# Patient Record
Sex: FEMALE | Race: WHITE | NOT HISPANIC OR LATINO | Employment: OTHER | URBAN - METROPOLITAN AREA
[De-identification: names, ages, dates, MRNs, and addresses within clinical notes are randomized per-mention and may not be internally consistent; named-entity substitution may affect disease eponyms.]

---

## 2017-05-23 DIAGNOSIS — E87.70 FLUID EXCESS: ICD-10-CM

## 2017-05-23 DIAGNOSIS — M79.609 LIMB PAIN: ICD-10-CM

## 2017-05-23 DIAGNOSIS — R06.00 DYSPNEA: Primary | ICD-10-CM

## 2017-06-08 ENCOUNTER — HOSPITAL ENCOUNTER (OUTPATIENT)
Dept: PULMONOLOGY | Facility: HOSPITAL | Age: 82
Discharge: HOME | End: 2017-06-08
Attending: INTERNAL MEDICINE
Payer: MEDICARE

## 2017-06-08 ENCOUNTER — HOSPITAL ENCOUNTER (OUTPATIENT)
Dept: RADIOLOGY | Facility: HOSPITAL | Age: 82
Discharge: HOME | End: 2017-06-08
Attending: INTERNAL MEDICINE
Payer: MEDICARE

## 2017-06-08 DIAGNOSIS — R06.00 DYSPNEA: ICD-10-CM

## 2017-06-08 DIAGNOSIS — E87.70 FLUID EXCESS: ICD-10-CM

## 2017-06-08 DIAGNOSIS — M79.609 LIMB PAIN: ICD-10-CM

## 2017-06-08 PROCEDURE — 93970 EXTREMITY STUDY: CPT | Mod: 26,,, | Performed by: RADIOLOGY

## 2017-06-08 PROCEDURE — 94010 BREATHING CAPACITY TEST: CPT

## 2017-06-08 PROCEDURE — 94726 PLETHYSMOGRAPHY LUNG VOLUMES: CPT

## 2017-06-08 PROCEDURE — 94729 DIFFUSING CAPACITY: CPT

## 2017-06-09 NOTE — PROCEDURES
This test meets ATS standards for quality and reproducibility.    FEV1 normal  FVC normal  FEV1/FVC RATIO normal   TLC normal  DLCO decreased    Impression:  No obstruction.  No restriction.  Moderately decreased DLCO uncorrected for hemoglobin.    Compared with PFTs from 2014, FEV1 and FVC both improved.      Adarsh Goncalves MD  Fellow, [facility]  Cell [phone]

## 2017-06-13 DIAGNOSIS — I10 HYPERTENSION: Primary | ICD-10-CM

## 2017-06-16 DIAGNOSIS — Z12.31 VISIT FOR SCREENING MAMMOGRAM: Primary | ICD-10-CM

## 2017-06-20 ENCOUNTER — HOSPITAL ENCOUNTER (OUTPATIENT)
Dept: RADIOLOGY | Facility: HOSPITAL | Age: 82
Discharge: HOME | End: 2017-06-20
Attending: INTERNAL MEDICINE
Payer: MEDICARE

## 2017-06-20 DIAGNOSIS — I10 HYPERTENSION: ICD-10-CM

## 2017-06-20 PROCEDURE — 71250 CT THORAX DX C-: CPT | Mod: TC

## 2017-06-20 PROCEDURE — 71250 CT THORAX DX C-: CPT | Mod: 26,,, | Performed by: RADIOLOGY

## 2017-07-11 DIAGNOSIS — J44.9 COPD (CHRONIC OBSTRUCTIVE PULMONARY DISEASE): Primary | ICD-10-CM

## 2017-07-13 ENCOUNTER — HOSPITAL ENCOUNTER (OUTPATIENT)
Dept: RADIOLOGY | Facility: HOSPITAL | Age: 82
Discharge: HOME | End: 2017-07-13
Attending: INTERNAL MEDICINE
Payer: MEDICARE

## 2017-07-13 VITALS — BODY MASS INDEX: 26.22 KG/M2 | HEIGHT: 63 IN | WEIGHT: 148 LBS

## 2017-07-13 DIAGNOSIS — Z12.31 VISIT FOR SCREENING MAMMOGRAM: ICD-10-CM

## 2017-07-13 PROCEDURE — 77063 BREAST TOMOSYNTHESIS BI: CPT | Mod: 26,,, | Performed by: RADIOLOGY

## 2017-07-13 PROCEDURE — 77067 SCR MAMMO BI INCL CAD: CPT | Mod: TC

## 2017-07-13 PROCEDURE — 77067 SCR MAMMO BI INCL CAD: CPT | Mod: 26,,, | Performed by: RADIOLOGY

## 2017-07-19 ENCOUNTER — HOSPITAL ENCOUNTER (OUTPATIENT)
Dept: RADIOLOGY | Facility: HOSPITAL | Age: 82
Discharge: HOME | End: 2017-07-19
Attending: INTERNAL MEDICINE
Payer: MEDICARE

## 2017-07-19 DIAGNOSIS — J44.9 COPD (CHRONIC OBSTRUCTIVE PULMONARY DISEASE): Primary | ICD-10-CM

## 2017-07-19 DIAGNOSIS — J44.9 COPD (CHRONIC OBSTRUCTIVE PULMONARY DISEASE): ICD-10-CM

## 2017-07-19 PROCEDURE — 78582 LUNG VENTILAT&PERFUS IMAGING: CPT | Mod: 26,,, | Performed by: RADIOLOGY

## 2017-07-19 PROCEDURE — 71020 XR CHEST PA AND LATERAL: CPT | Mod: TC

## 2017-07-19 PROCEDURE — 78582 LUNG VENTILAT&PERFUS IMAGING: CPT | Mod: TC

## 2017-07-19 PROCEDURE — 71020 XR CHEST PA AND LATERAL: CPT | Mod: 26,,, | Performed by: RADIOLOGY

## 2017-07-19 PROCEDURE — A9558 XE133 XENON 10MCI: HCPCS

## 2017-08-04 ENCOUNTER — HOSPITAL ENCOUNTER (OUTPATIENT)
Dept: RADIOLOGY | Facility: HOSPITAL | Age: 82
Discharge: HOME | End: 2017-08-04
Attending: INTERNAL MEDICINE
Payer: MEDICARE

## 2017-08-04 DIAGNOSIS — M17.11 PRIMARY OSTEOARTHRITIS OF RIGHT KNEE: Primary | ICD-10-CM

## 2017-08-04 DIAGNOSIS — M17.11 PRIMARY OSTEOARTHRITIS OF RIGHT KNEE: ICD-10-CM

## 2017-08-04 PROCEDURE — 73560 X-RAY EXAM OF KNEE 1 OR 2: CPT | Mod: TC,RT

## 2017-08-04 PROCEDURE — 73560 X-RAY EXAM OF KNEE 1 OR 2: CPT | Mod: 26,RT,, | Performed by: RADIOLOGY

## 2017-08-09 DIAGNOSIS — J44.9 COPD (CHRONIC OBSTRUCTIVE PULMONARY DISEASE): ICD-10-CM

## 2017-08-09 DIAGNOSIS — J98.8 AIRWAY OBSTRUCTION: Primary | ICD-10-CM

## 2017-08-09 DIAGNOSIS — I10 HTN (HYPERTENSION): Primary | ICD-10-CM

## 2017-08-22 ENCOUNTER — OFFICE VISIT (OUTPATIENT)
Dept: CARDIOLOGY | Facility: CLINIC | Age: 82
End: 2017-08-22
Payer: MEDICARE

## 2017-08-22 VITALS
SYSTOLIC BLOOD PRESSURE: 137 MMHG | OXYGEN SATURATION: 99 % | HEART RATE: 74 BPM | WEIGHT: 147.81 LBS | BODY MASS INDEX: 26.19 KG/M2 | HEIGHT: 63 IN | DIASTOLIC BLOOD PRESSURE: 70 MMHG

## 2017-08-22 DIAGNOSIS — E78.5 HYPERLIPIDEMIA, UNSPECIFIED HYPERLIPIDEMIA TYPE: ICD-10-CM

## 2017-08-22 DIAGNOSIS — R06.02 SOBOE (SHORTNESS OF BREATH ON EXERTION): Primary | ICD-10-CM

## 2017-08-22 DIAGNOSIS — I10 ESSENTIAL HYPERTENSION: ICD-10-CM

## 2017-08-22 PROCEDURE — 1126F AMNT PAIN NOTED NONE PRSNT: CPT | Mod: S$GLB,,, | Performed by: INTERNAL MEDICINE

## 2017-08-22 PROCEDURE — 3008F BODY MASS INDEX DOCD: CPT | Mod: S$GLB,,, | Performed by: INTERNAL MEDICINE

## 2017-08-22 PROCEDURE — 1159F MED LIST DOCD IN RCRD: CPT | Mod: S$GLB,,, | Performed by: INTERNAL MEDICINE

## 2017-08-22 PROCEDURE — 99999 PR PBB SHADOW E&M-EST. PATIENT-LVL III: CPT | Mod: PBBFAC,,, | Performed by: INTERNAL MEDICINE

## 2017-08-22 PROCEDURE — 3078F DIAST BP <80 MM HG: CPT | Mod: S$GLB,,, | Performed by: INTERNAL MEDICINE

## 2017-08-22 PROCEDURE — 99214 OFFICE O/P EST MOD 30 MIN: CPT | Mod: S$GLB,,, | Performed by: INTERNAL MEDICINE

## 2017-08-22 PROCEDURE — 3075F SYST BP GE 130 - 139MM HG: CPT | Mod: S$GLB,,, | Performed by: INTERNAL MEDICINE

## 2017-08-22 RX ORDER — FUROSEMIDE 20 MG/1
TABLET ORAL
COMMUNITY
Start: 2017-08-06 | End: 2017-09-22

## 2017-08-22 RX ORDER — CELECOXIB 200 MG/1
CAPSULE ORAL 2 TIMES DAILY
COMMUNITY
Start: 2017-08-04 | End: 2020-10-20

## 2017-08-22 NOTE — LETTER
August 22, 2017      Vijay Carrizales MD  [address]  [address]  [facility] [address]           [facility]  [address], [address]  [facility] [address]  Phone: [phone]          Patient: Lu White   MR Number: [id number]   YOB: 1932   Date of Visit: 8/22/2017       Dear Dr. Vijay Carrizales:    Thank you for referring Lu White to me for evaluation. Attached you will find relevant portions of my assessment and plan of care.    If you have questions, please do not hesitate to call me. I look forward to following Lu White along with you.    Sincerely,    Andreas Munoz MD    Enclosure  CC:  No Recipients    If you would like to receive this communication electronically, please contact [email or web address] or [phone] to request more information on [employer] Link access.    For providers and/or their staff who would like to refer a patient to [facility], please contact us through our one-stop-shop provider referral line, [facility], at [phone].    If you feel you have received this communication in error or would no longer like to receive these types of communications, please e-mail [email or web address]

## 2017-08-22 NOTE — PROGRESS NOTES
Subjective:    Patient ID:  Lu White is a 84 y.o. female who presents for evaluation of Shortness of Breath      HPI  85 y/o female seen by Dr Burnett in the past with a hx of HTN, HLD, who presents for evaluation of SOB. She has been having SOB and has had an extensive cardiac and pulmonary w/u. Has mild PH per 2DE. LAM has worsened over the last several months. She denies CP, orthopnea, PND, syncope, palps. Compliant with meds. Family hx of CAD and sister with similar symptoms and subsequent CABG.     Review of Systems   Constitution: Negative for weakness and malaise/fatigue.   HENT: Negative for congestion and headaches.    Eyes: Negative for blurred vision.   Cardiovascular: Positive for dyspnea on exertion. Negative for chest pain, claudication, cyanosis, irregular heartbeat, leg swelling, near-syncope, orthopnea, palpitations, paroxysmal nocturnal dyspnea and syncope.   Respiratory: Negative for shortness of breath.    Endocrine: Negative for polyuria.   Hematologic/Lymphatic: Negative for bleeding problem.   Skin: Negative for itching and rash.   Musculoskeletal: Negative for joint swelling, muscle cramps and muscle weakness.   Gastrointestinal: Negative for abdominal pain, hematemesis, hematochezia, melena, nausea and vomiting.   Genitourinary: Negative for dysuria and hematuria.   Neurological: Negative for dizziness, focal weakness, light-headedness and loss of balance.   Psychiatric/Behavioral: Negative for depression. The patient is not nervous/anxious.         Objective:    Physical Exam   Constitutional: She is oriented to person, place, and time. She appears well-developed and well-nourished.   HENT:   Head: Normocephalic and atraumatic.   Neck: Neck supple. No JVD present.   Cardiovascular: Normal rate, regular rhythm and normal heart sounds.    Pulses:       Carotid pulses are 2+ on the right side, and 2+ on the left side.       Radial pulses are 2+ on the right side, and 2+ on the left  side.        Femoral pulses are 2+ on the right side, and 2+ on the left side.       Dorsalis pedis pulses are 2+ on the right side, and 2+ on the left side.        Posterior tibial pulses are 2+ on the right side, and 2+ on the left side.   Pulmonary/Chest: Effort normal and breath sounds normal.   Abdominal: Soft. Bowel sounds are normal.   Musculoskeletal: She exhibits no edema.   Neurological: She is alert and oriented to person, place, and time.   Skin: Skin is warm and dry.   Psychiatric: She has a normal mood and affect. Her behavior is normal. Thought content normal.         Assessment:       1. SOBOE (shortness of breath on exertion)    2. Hyperlipidemia, unspecified hyperlipidemia type    3. Essential hypertension      83 y/o female with hx and presentation as above. Will evaluate for SOB as an anginal equivalent or cardiac in etiology with PET stress and 2DE.        Plan:       -PET stress  -2DE with CFD  -f/u phone review

## 2017-08-30 ENCOUNTER — HOSPITAL ENCOUNTER (OUTPATIENT)
Dept: CARDIOLOGY | Facility: CLINIC | Age: 82
Discharge: HOME | End: 2017-08-30
Payer: MEDICARE

## 2017-08-30 DIAGNOSIS — R06.02 SOBOE (SHORTNESS OF BREATH ON EXERTION): ICD-10-CM

## 2017-08-30 DIAGNOSIS — I10 ESSENTIAL HYPERTENSION: ICD-10-CM

## 2017-08-30 LAB
DIASTOLIC DYSFUNCTION: YES
ESTIMATED PA SYSTOLIC PRESSURE: 30.04
MITRAL VALVE MOBILITY: NORMAL
MITRAL VALVE REGURGITATION: ABNORMAL
RETIRED EF AND QEF - SEE NOTES: 65 (ref 55–65)
TRICUSPID VALVE REGURGITATION: ABNORMAL

## 2017-08-30 PROCEDURE — 93306 TTE W/DOPPLER COMPLETE: CPT | Mod: S$GLB,,, | Performed by: INTERNAL MEDICINE

## 2017-09-05 ENCOUNTER — CLINICAL SUPPORT (OUTPATIENT)
Dept: CARDIOLOGY | Facility: CLINIC | Age: 82
End: 2017-09-05
Payer: MEDICARE

## 2017-09-05 ENCOUNTER — TELEPHONE (OUTPATIENT)
Dept: CARDIOLOGY | Facility: CLINIC | Age: 82
End: 2017-09-05

## 2017-09-05 DIAGNOSIS — R06.02 SOBOE (SHORTNESS OF BREATH ON EXERTION): ICD-10-CM

## 2017-09-05 DIAGNOSIS — I10 ESSENTIAL HYPERTENSION: ICD-10-CM

## 2017-09-05 PROCEDURE — A9555 RB82 RUBIDIUM: HCPCS | Mod: S$GLB,,, | Performed by: INTERNAL MEDICINE

## 2017-09-05 PROCEDURE — 93015 CV STRESS TEST SUPVJ I&R: CPT | Mod: S$GLB,,, | Performed by: INTERNAL MEDICINE

## 2017-09-05 PROCEDURE — 78492 MYOCRD IMG PET MLT RST&STRS: CPT | Mod: S$GLB,,, | Performed by: INTERNAL MEDICINE

## 2017-09-05 NOTE — TELEPHONE ENCOUNTER
----- Message from Andreas Munoz MD sent at 8/31/2017  8:47 AM CDT -----  Please let Ms White know that her echo shows normal heart function and valve function. She has diastolic dysfunction which is abnormal relaxation of heart muscle due to chronic high blood pressure and treatment is just blood pressure control and low salt diet  Thanks  RADHA

## 2017-09-07 ENCOUNTER — TELEPHONE (OUTPATIENT)
Dept: CARDIOLOGY | Facility: CLINIC | Age: 82
End: 2017-09-07

## 2017-09-07 NOTE — TELEPHONE ENCOUNTER
----- Message from Andreas Munoz MD sent at 9/7/2017  1:05 PM CDT -----  Please let Ms White know that her stress test was normal   Thanks  RADHA

## 2017-09-11 ENCOUNTER — PATIENT MESSAGE (OUTPATIENT)
Dept: CARDIOLOGY | Facility: CLINIC | Age: 82
End: 2017-09-11

## 2017-09-22 ENCOUNTER — OFFICE VISIT (OUTPATIENT)
Dept: CARDIOLOGY | Facility: CLINIC | Age: 82
End: 2017-09-22
Payer: MEDICARE

## 2017-09-22 VITALS
OXYGEN SATURATION: 96 % | BODY MASS INDEX: 26.64 KG/M2 | HEIGHT: 63 IN | HEART RATE: 66 BPM | SYSTOLIC BLOOD PRESSURE: 161 MMHG | WEIGHT: 150.38 LBS | DIASTOLIC BLOOD PRESSURE: 67 MMHG

## 2017-09-22 DIAGNOSIS — R06.02 SOBOE (SHORTNESS OF BREATH ON EXERTION): Primary | ICD-10-CM

## 2017-09-22 DIAGNOSIS — I51.89 DIASTOLIC DYSFUNCTION: ICD-10-CM

## 2017-09-22 DIAGNOSIS — I10 ESSENTIAL HYPERTENSION: ICD-10-CM

## 2017-09-22 DIAGNOSIS — E78.5 HYPERLIPIDEMIA, UNSPECIFIED HYPERLIPIDEMIA TYPE: ICD-10-CM

## 2017-09-22 PROCEDURE — 1159F MED LIST DOCD IN RCRD: CPT | Mod: S$GLB,,, | Performed by: INTERNAL MEDICINE

## 2017-09-22 PROCEDURE — 3078F DIAST BP <80 MM HG: CPT | Mod: S$GLB,,, | Performed by: INTERNAL MEDICINE

## 2017-09-22 PROCEDURE — 99214 OFFICE O/P EST MOD 30 MIN: CPT | Mod: S$GLB,,, | Performed by: INTERNAL MEDICINE

## 2017-09-22 PROCEDURE — 99999 PR PBB SHADOW E&M-EST. PATIENT-LVL III: CPT | Mod: PBBFAC,,, | Performed by: INTERNAL MEDICINE

## 2017-09-22 PROCEDURE — 3077F SYST BP >= 140 MM HG: CPT | Mod: S$GLB,,, | Performed by: INTERNAL MEDICINE

## 2017-09-22 PROCEDURE — 3008F BODY MASS INDEX DOCD: CPT | Mod: S$GLB,,, | Performed by: INTERNAL MEDICINE

## 2017-09-22 PROCEDURE — 1126F AMNT PAIN NOTED NONE PRSNT: CPT | Mod: S$GLB,,, | Performed by: INTERNAL MEDICINE

## 2017-09-22 RX ORDER — LOSARTAN POTASSIUM 50 MG/1
50 TABLET ORAL DAILY
COMMUNITY
End: 2020-10-20

## 2017-09-22 RX ORDER — CHLORTHALIDONE 25 MG/1
25 TABLET ORAL DAILY
Qty: 30 TABLET | Refills: 11 | Status: SHIPPED | OUTPATIENT
Start: 2017-09-22 | End: 2018-09-17 | Stop reason: SDUPTHER

## 2017-09-22 NOTE — PROGRESS NOTES
Subjective:    Patient ID:  Lu White is a 84 y.o. female who presents for follow-up of Shortness of Breath      HPI  83 y/o female with a hx of HTN, HLD, who presents for evaluation of SOB. She has been having SOB and has had an extensive cardiac and pulmonary w/u. LAM has worsened over the last several months. She denies CP, orthopnea, PND, syncope, palps. Compliant with meds. Family hx of CAD and sister with similar symptoms and subsequent CABG. Last clinic visit PET stress ordered (normal) and 2DE ordered which showed preserved EF and Grade I diastolic dysfunction. Medications have been changed and she is no longer on HCTZ 12.5 mg and was placed on lasix. Does not follow a low salt diet. States she mainly gets SOB when walking in the heat. Has seen Dr Burnett and Dr Mahan in the past.     Review of Systems   Constitution: Negative for weakness and malaise/fatigue.   HENT: Negative for congestion.    Eyes: Negative for blurred vision.   Cardiovascular: Positive for dyspnea on exertion. Negative for chest pain, claudication, cyanosis, irregular heartbeat, leg swelling, near-syncope, orthopnea, palpitations, paroxysmal nocturnal dyspnea and syncope.   Respiratory: Negative for shortness of breath.    Endocrine: Negative for polyuria.   Hematologic/Lymphatic: Negative for bleeding problem.   Skin: Negative for itching and rash.   Musculoskeletal: Negative for joint swelling, muscle cramps and muscle weakness.   Gastrointestinal: Negative for abdominal pain, hematemesis, hematochezia, melena, nausea and vomiting.   Genitourinary: Negative for dysuria and hematuria.   Neurological: Negative for dizziness, focal weakness, headaches, light-headedness and loss of balance.   Psychiatric/Behavioral: Negative for depression. The patient is not nervous/anxious.         Objective:    Physical Exam   Constitutional: She is oriented to person, place, and time. She appears well-developed and well-nourished.    HENT:   Head: Normocephalic and atraumatic.   Neck: Neck supple. No JVD present.   Cardiovascular: Normal rate, regular rhythm and normal heart sounds.    Pulses:       Carotid pulses are 2+ on the right side, and 2+ on the left side.       Radial pulses are 2+ on the right side, and 2+ on the left side.        Femoral pulses are 2+ on the right side, and 2+ on the left side.       Dorsalis pedis pulses are 2+ on the right side, and 2+ on the left side.        Posterior tibial pulses are 2+ on the right side, and 2+ on the left side.   Pulmonary/Chest: Effort normal and breath sounds normal.   Abdominal: Soft. Bowel sounds are normal.   Musculoskeletal: She exhibits no edema.   Neurological: She is alert and oriented to person, place, and time.   Skin: Skin is warm and dry.   Psychiatric: She has a normal mood and affect. Her behavior is normal. Thought content normal.         Assessment:       1. SOBOE (shortness of breath on exertion)    2. Hyperlipidemia, unspecified hyperlipidemia type    3. Essential hypertension    4. Diastolic dysfunction      85 y/o female with hx and presentation as above. Extensive cardiac and pulmonary workup. Does not appear to have PHTN as a culprit. Diastolic dysfunction may be contributing. She does not follow a low salt diet and we discussed why this is important with regards to diastolic dysfunction. Will switch to chlorthalidone and hold lasix for now to see if this helps. May be mostly deconditioning, but will attempt to treat for diastolic dysfunction. BP elevated today and she was told to keep a log - may be having elevated BP at home which may be contributing to symptoms.        Plan:       -Hold Lasix  -Start chlorthalidone 25 mg daily  -f/u in 1 month

## 2017-09-25 ENCOUNTER — TELEPHONE (OUTPATIENT)
Dept: CARDIOLOGY | Facility: CLINIC | Age: 82
End: 2017-09-25

## 2017-09-25 NOTE — TELEPHONE ENCOUNTER
----- Message from Tish Noreen sent at 9/25/2017 10:30 AM CDT -----  Contact: self, [phone]  Patient called in requesting to speak with you, states she was seen on Friday and has a question. Please advise.

## 2017-10-16 ENCOUNTER — TELEPHONE (OUTPATIENT)
Dept: CARDIOLOGY | Facility: CLINIC | Age: 82
End: 2017-10-16

## 2017-10-17 ENCOUNTER — OFFICE VISIT (OUTPATIENT)
Dept: CARDIOLOGY | Facility: CLINIC | Age: 82
End: 2017-10-17
Payer: MEDICARE

## 2017-10-17 VITALS
HEART RATE: 54 BPM | WEIGHT: 149.69 LBS | OXYGEN SATURATION: 91 % | BODY MASS INDEX: 26.52 KG/M2 | SYSTOLIC BLOOD PRESSURE: 149 MMHG | DIASTOLIC BLOOD PRESSURE: 85 MMHG | HEIGHT: 63 IN

## 2017-10-17 DIAGNOSIS — R79.81 LOW OXYGEN SATURATION: ICD-10-CM

## 2017-10-17 DIAGNOSIS — I10 ESSENTIAL HYPERTENSION: ICD-10-CM

## 2017-10-17 DIAGNOSIS — E78.5 HYPERLIPIDEMIA, UNSPECIFIED HYPERLIPIDEMIA TYPE: ICD-10-CM

## 2017-10-17 DIAGNOSIS — I51.89 DIASTOLIC DYSFUNCTION: ICD-10-CM

## 2017-10-17 DIAGNOSIS — R06.02 SOBOE (SHORTNESS OF BREATH ON EXERTION): Primary | ICD-10-CM

## 2017-10-17 PROCEDURE — 99214 OFFICE O/P EST MOD 30 MIN: CPT | Mod: S$GLB,,, | Performed by: INTERNAL MEDICINE

## 2017-10-17 PROCEDURE — 99999 PR PBB SHADOW E&M-EST. PATIENT-LVL III: CPT | Mod: PBBFAC,,, | Performed by: INTERNAL MEDICINE

## 2017-10-17 NOTE — PROGRESS NOTES
Subjective:    Patient ID:  Lu White is a 85 y.o. female who presents for follow-up of Shortness of Breath      HPI  84 y/o female with a hx of HTN, HLD, who presents for f/u of SOB. She has been having SOB and has had an extensive cardiac and pulmonary w/u. LAM has worsened over the last several months. She denies CP, orthopnea, PND, syncope, palps. Compliant with meds. Family hx of CAD and sister with similar symptoms and subsequent CABG. I have ordered a PET stress which was normal and 2DE ordered which showed preserved EF and Grade I diastolic dysfunction. Was started on chlorthalidone after being on HCTZ and then on lasix with no improvement in symptoms. She was not following a low salt diet until after last visit. States she mainly gets SOB when walking in the heat. Has seen Dr Burnett and Dr Mahan in the past. She states that if she walks in [place] without a carriage that she gets SOB, however, if she uses a carriage, she does not get the symptoms. SaO2 in clinic today 91%.    Review of Systems   Constitution: Negative for weakness and malaise/fatigue.   HENT: Negative for congestion.    Eyes: Negative for blurred vision.   Cardiovascular: Positive for dyspnea on exertion. Negative for chest pain, claudication, cyanosis, irregular heartbeat, leg swelling, near-syncope, orthopnea, palpitations, paroxysmal nocturnal dyspnea and syncope.   Respiratory: Negative for shortness of breath.    Endocrine: Negative for polyuria.   Hematologic/Lymphatic: Negative for bleeding problem.   Skin: Negative for itching and rash.   Musculoskeletal: Negative for joint swelling, muscle cramps and muscle weakness.   Gastrointestinal: Negative for abdominal pain, hematemesis, hematochezia, melena, nausea and vomiting.   Genitourinary: Negative for dysuria and hematuria.   Neurological: Negative for dizziness, focal weakness, headaches, light-headedness and loss of balance.   Psychiatric/Behavioral: Negative for  depression. The patient is not nervous/anxious.         Objective:    Physical Exam   Constitutional: She is oriented to person, place, and time. She appears well-developed and well-nourished.   HENT:   Head: Normocephalic and atraumatic.   Neck: Neck supple. No JVD present.   Cardiovascular: Normal rate, regular rhythm and normal heart sounds.    Pulses:       Carotid pulses are 2+ on the right side, and 2+ on the left side.       Radial pulses are 2+ on the right side, and 2+ on the left side.        Femoral pulses are 2+ on the right side, and 2+ on the left side.       Dorsalis pedis pulses are 2+ on the right side, and 2+ on the left side.        Posterior tibial pulses are 2+ on the right side, and 2+ on the left side.   Pulmonary/Chest: Effort normal and breath sounds normal.   Abdominal: Soft. Bowel sounds are normal.   Musculoskeletal: She exhibits no edema.   Neurological: She is alert and oriented to person, place, and time.   Skin: Skin is warm and dry.   Psychiatric: She has a normal mood and affect. Her behavior is normal. Thought content normal.         Assessment:       1. SOBOE (shortness of breath on exertion)    2. Diastolic dysfunction    3. Hyperlipidemia, unspecified hyperlipidemia type    4. Essential hypertension    5. Low oxygen saturation      84 y/o female with hx and presentation as above. SOB is out of proportion to cardiac findings (Grade I diastolic dysfunction). BP log available and -low 130's at home and she claims to be following a low salt diet. Already on medical management for DD and it doesn't appear as this is the main culprit. Has had an extensive cardiac w/u and recommended she see her pulmonologist for evaluation.        Plan:       -Continue current medical management  -Evaluate for non cardiac causes of her symptoms

## 2018-01-18 ENCOUNTER — HOSPITAL ENCOUNTER (EMERGENCY)
Facility: HOSPITAL | Age: 83
Discharge: HOME | End: 2018-01-18
Attending: EMERGENCY MEDICINE
Payer: MEDICARE

## 2018-01-18 VITALS
WEIGHT: 148 LBS | OXYGEN SATURATION: 98 % | HEART RATE: 64 BPM | TEMPERATURE: 96 F | SYSTOLIC BLOOD PRESSURE: 151 MMHG | HEIGHT: 64 IN | RESPIRATION RATE: 18 BRPM | BODY MASS INDEX: 25.27 KG/M2 | DIASTOLIC BLOOD PRESSURE: 64 MMHG

## 2018-01-18 DIAGNOSIS — S01.01XA SCALP LACERATION, INITIAL ENCOUNTER: ICD-10-CM
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
## 2018-01-18 PROCEDURE — 12002 RPR S/N/AX/GEN/TRNK2.6-7.5CM: CPT

## 2018-01-18 PROCEDURE — 25000003 PHARM REV CODE 250: Performed by: EMERGENCY MEDICINE

## 2018-01-18 PROCEDURE — 99284 EMERGENCY DEPT VISIT MOD MDM: CPT | Mod: 25

## 2018-01-18 RX ORDER — LIDOCAINE HYDROCHLORIDE AND EPINEPHRINE 10; 10 MG/ML; UG/ML
10 INJECTION, SOLUTION INFILTRATION; PERINEURAL
Status: DISCONTINUED | OUTPATIENT
Start: 2018-01-18 | End: 2018-01-18

## 2018-01-18 RX ORDER — GABAPENTIN 300 MG/1
300 CAPSULE ORAL 3 TIMES DAILY
COMMUNITY
End: 2020-10-20

## 2018-01-18 RX ORDER — ACETAMINOPHEN 500 MG
425 TABLET ORAL DAILY PRN
Status: ON HOLD | COMMUNITY
End: 2023-12-08 | Stop reason: HOSPADM

## 2018-01-18 RX ADMIN — LIDOCAINE HYDROCHLORIDE 10 ML: 10; .005 INJECTION, SOLUTION EPIDURAL; INFILTRATION; INTRACAUDAL; PERINEURAL at 04:01

## 2018-01-18 NOTE — ED PROVIDER NOTES
Encounter Date: 1/18/2018    SCRIBE #1 NOTE: I, Lindsey Johnson, am scribing for, and in the presence of, Dr. Maciel.       History     Chief Complaint   Patient presents with    Fall     Fell while trying to get out of bed just PTA. Hit head on furniture sustaining laceration to left side of head. No LOC. No active bleeding. 1 week s/p right rotator cuff repair - states since arm is in sling, could not keep herself from falling. Presents awake, alert, oriented. Gait WNL. No complaints r/t right shoulder.      Time seen by provider: 0329    This is a 85 y.o. female with PMHx of HLD and HTN and recent right shoulder surgery who presents s/p mechanical fall this AM. She is currently in a R shoulder immobilizer/sling due to recent surgery. She says she got out of bed to go to the restroom, lost balance, and fell. and landed on her purposefully to her left side to avoid injuring her shoulder. She reports she hit her head on a wooden edge of her sewing machine. She noticed bleeding from her laceration, but denies any other injury or trauma. The patient denies syncope/loss of consciousness, or pain to neck, back, upper or lower extremities.      The history is provided by the patient.     Review of patient's allergies indicates:  No Known Allergies  Past Medical History:   Diagnosis Date    Diverticular disease     Hyperlipidemia     Hypertension      Past Surgical History:   Procedure Laterality Date    EYE SURGERY      both eyes catarac    HYSTERECTOMY  1970    OVARY SURGERY      ROTATOR CUFF REPAIR Right      Family History   Problem Relation Age of Onset    Heart disease Father      Social History   Substance Use Topics    Smoking status: Never Smoker    Smokeless tobacco: Never Used    Alcohol use No     Review of Systems   Constitutional: Negative for activity change, chills, fatigue and fever.   HENT: Negative for congestion and sore throat.    Respiratory: Negative for cough and shortness of breath.   "  Cardiovascular: Negative for chest pain.   Gastrointestinal: Negative for abdominal pain, diarrhea, nausea and vomiting.   Genitourinary: Negative for difficulty urinating, dysuria, frequency and urgency.   Musculoskeletal: Negative for arthralgias, back pain, myalgias and neck pain.   Skin: Positive for wound. Negative for rash.   Neurological: Negative for dizziness, syncope, weakness and headaches.   Hematological: Does not bruise/bleed easily.   Psychiatric/Behavioral: Negative for agitation, behavioral problems, confusion, decreased concentration and dysphoric mood.       Physical Exam     Vitals:    01/18/18 0303 01/18/18 0330 01/18/18 0513   BP: (!) 205/84 (!) 164/67 (!) 151/64   BP Location: Left arm     Patient Position: Sitting     Pulse: 66 64 64   Resp: 15  18   Temp: 96.3 °F (35.7 °C)     TempSrc: Oral     SpO2: 99% 98% 98%   Weight: 67.1 kg (148 lb)     Height: 5' 4" (1.626 m)           Physical Exam    Nursing note and vitals reviewed.  Constitutional: She appears well-developed and well-nourished. She is not diaphoretic. No distress.   HENT:   Head: Normocephalic. Head is with abrasion and with laceration.   Mouth/Throat: Oropharynx is clear and moist.   ~3 cm V-shaped laceration to left parietal scalp  ~2 cm linear laceration lateral/occipital scalp.  No active bleeding.   Eyes: Conjunctivae and EOM are normal. Pupils are equal, round, and reactive to light.   Neck: Normal range of motion. Neck supple. No tracheal deviation present.   Cardiovascular: Normal rate, regular rhythm, normal heart sounds and intact distal pulses. Exam reveals no gallop and no friction rub.    No murmur heard.  Pulmonary/Chest: Breath sounds normal. No stridor. No respiratory distress. She has no wheezes. She has no rhonchi. She has no rales.   Abdominal: Soft. Bowel sounds are normal. She exhibits no distension and no mass. There is no tenderness.   Musculoskeletal: Normal range of motion. She exhibits no edema.        " Cervical back: She exhibits no tenderness and no bony tenderness.        Thoracic back: She exhibits no tenderness and no bony tenderness.        Lumbar back: She exhibits no tenderness and no bony tenderness.   Right arm in sling, no other areas of tenderness or pain, no evidence of trauma  FROM LUE/BLE   Neurological: She is alert and oriented to person, place, and time. She has normal strength. No cranial nerve deficit or sensory deficit.   Skin: Skin is warm and dry. Capillary refill takes less than 2 seconds. Laceration noted. No pallor.   Psychiatric: She has a normal mood and affect. Her behavior is normal. Thought content normal.         ED Course   Lac Repair  Date/Time: 1/18/2018 4:30 AM  Performed by: CEDRIC SIMMS  Authorized by: CEDRIC SIMMS   Consent Done: Yes  Consent: Verbal consent obtained.  Body area: head/neck  Location details: scalp  Laceration length: 3 cm  Tendon involvement: none  Nerve involvement: none  Vascular damage: no  Anesthesia: local infiltration    Anesthesia:  Local Anesthetic: lidocaine 1% with epinephrine  Patient sedated: no  Irrigation solution: tap water  Irrigation method: tap  Amount of cleaning: standard  Debridement: none  Degree of undermining: none  Skin closure: staples  Number of sutures: 5  Technique: simple  Approximation: close  Approximation difficulty: simple  Patient tolerance: Patient tolerated the procedure well with no immediate complications    Lac Repair  Date/Time: 1/18/2018 5:01 AM  Performed by: CEDRIC SIMMS  Authorized by: CEDRIC SIMMS   Consent Done: Yes  Consent: Verbal consent obtained.  Body area: head/neck  Location details: scalp  Laceration length: 2 cm  Tendon involvement: none  Nerve involvement: none  Vascular damage: no  Anesthesia: local infiltration    Anesthesia:  Local Anesthetic: lidocaine 1% with epinephrine  Irrigation solution: tap water  Irrigation method: tap  Amount of cleaning: standard  Debridement: none  Degree of  undermining: none  Skin closure: staples  Number of sutures: 2  Technique: simple  Approximation: close  Approximation difficulty: simple  Patient tolerance: Patient tolerated the procedure well with no immediate complications        Labs Reviewed - No data to display       X-Rays:   Independently Interpreted Readings:   Other Readings:  Reviewed by myself, read by radiology.      CT Head Without Contrast   Final Result         No acute intracranial abnormalities.      Senescent changes.  Remote lacunar infarct left basal ganglia.      Soft tissue scalp hematoma and laceration involving the left temporal scalp.         Electronically signed by: DAVID WOODY MD   Date:     01/18/18   Time:    03:58          Medical Decision Making:   Initial Assessment:   This is a 85 year old female who presents s/p mechanical fall at home with v-shaped and linear lacerations to the left scalp. Plan to obtain CT head and repair scalp lacerations at bedside.  Differential Diagnosis:   Differential Diagnosis includes, but is not limited to:  Polytrauma, fall/syncope, traumatic SAH/intracranial bleed, skull/c-spine/facial fracture, concussion, neck injury, chest trauma, intraabdominal bleed, solid organ injury, pelvic fracture, long bone fracture/dislocation, nerve injury/palsy, vascular injury, hemarthrosis, septic joint, osteoarthritis, compartment syndrome, rhabdomyolysis, soft tissue contusion, muscle strain, ligament tear/sprain, foreign body, laceration, abrasion.    Clinical Tests:   Radiological Study: Ordered and Reviewed  ED Management:  CT shows no acute intracranial trauma.   Laceration repair performed at bedside with local anesthetic and staples.   Pt tolerated the procedure with close approximation and is stable for discharge.   I counseled pt to follow up with PCP or return to ER in one week to have wound recheck and staple removal.     After complete evaluation, including thorough history and physical exam, the  patient's injury and lacerations were deemed to be appropriate for primary closure in the ED. The wounds were irrigated extensively and inspected for foreign body, underlying structure injury, or other associated complications, and none were found. The wounds were repaired using staples. The patient was given appropriate wound care instructions, including keeping wound clean/dry, use of sterile bandages, and avoiding submersion in standing water. Due to the nature of the wounds, no antibiotics were deemed necessary. The patient was instructed to regularly monitor the wound for any evidence of infection, including redness, fever, or drainage. The patient was counseled on follow-up with PCP or return to ER in 7 days for wound re-check and staple removal. Patient stated understanding and comfortable with plan of care.     Upon re-evaluation, the patient's status has improved.  After complete ED evaluation, clinical impression is most consistent with fall, scalp lacerations.  At this time, I feel there is no emergent condition requiring further evaluation or admission. I believe the patient is stable for discharge from the ED. The patient and any additional family present were updated with test results, overall clinical impression, and recommended further plan of care. All questions were answered. The patient expressed understanding and agreed with current plan for discharge with PCP/ER follow-up within 1 week. Strict return precautions were provided, including return/worsening of current symptoms or any other concerns.                      ED Course      Clinical Impression:     1. Fall, initial encounter    2. Scalp laceration, initial encounter         Disposition:   Disposition: Discharged  Condition: Stable           I, Dr. Vijay Maciel, personally performed the services described in this documentation. All medical record entries made by the scribe were at my direction and in my presence.  I have reviewed the chart  and agree that the record reflects my personal performance and is accurate and complete.     Vijay Maciel MD.           Vijay Maciel MD  02/19/18 3985

## 2018-01-18 NOTE — ED TRIAGE NOTES
Patient presents to the ED with 2 lacerations and a contusion located to left side of head. Not actively bleeding. Patient states she was getting out of bed when she lost her footing and hit her head on a wooden sewing machine that was located right next to bed. Patient recently had surgery on right shoulder and arm is in sling. Patient denies LOC at time of accident and denies change in vision. Denies head pain or pain in any extremities. Denies N/V or pain.  Patient appears very stable. Oriented x 4. Daughter is at bedside    Review of patient's allergies indicates:  No Known Allergies     Patient has verified the spelling of their name and  on armband.   APPEARANCE: Patient is alert, calm, oriented x 4, and does not appear distressed.  HEENT: 2 lacerations located to top left head, one laceration v shaped and the other straight. Contusion also noted. Both lacerations look deep but not actively bleeding. Both 1.5 cm in length, neg head pain  SKIN: Skin is normal for race, warm, and dry. Normal skin turgor and mucous membranes moist.  CARDIAC: Normal rate and rhythm, no murmur heard.   RESPIRATORY:Normal rate and effort. Breath sounds clear bilaterally throughout chest. Respirations are equal and unlabored.    GASTRO: Bowel sounds normal, abdomen is soft, no tenderness, and no abdominal distention.  MUSCLE: Full ROM. No bony tenderness or soft tissue tenderness. No obvious deformity.  PERIPHERAL VASCULAR: peripheral pulses present. Normal cap refill. No edema. Warm to touch.  NEURO: 5/5 strength major flexors/extensors bilaterally. Sensory intact to light touch bilaterally. [place] coma scale: eyes open spontaneously-4, oriented & converses-5, obeys commands-6. No neurological abnormalities.   MENTAL STATUS: awake, alert and aware of environment.  EYE: PERRL, both eyes: pupils brisk and reactive to light. Normal size.

## 2018-05-18 ENCOUNTER — CLINICAL SUPPORT (OUTPATIENT)
Dept: LAB | Facility: HOSPITAL | Age: 83
End: 2018-05-18
Attending: INTERNAL MEDICINE
Payer: MEDICARE

## 2018-05-18 ENCOUNTER — HOSPITAL ENCOUNTER (OUTPATIENT)
Dept: RADIOLOGY | Facility: HOSPITAL | Age: 83
Discharge: HOME | End: 2018-05-18
Attending: INTERNAL MEDICINE
Payer: MEDICARE

## 2018-05-18 DIAGNOSIS — Z01.818 PREOP EXAMINATION: ICD-10-CM

## 2018-05-18 DIAGNOSIS — Z01.818 PREOP EXAMINATION: Primary | ICD-10-CM

## 2018-05-18 PROCEDURE — 93005 ELECTROCARDIOGRAM TRACING: CPT

## 2018-05-18 PROCEDURE — 71046 X-RAY EXAM CHEST 2 VIEWS: CPT | Mod: 26,,, | Performed by: RADIOLOGY

## 2018-05-18 PROCEDURE — 71046 X-RAY EXAM CHEST 2 VIEWS: CPT | Mod: TC,FY

## 2018-09-17 RX ORDER — CHLORTHALIDONE 25 MG/1
25 TABLET ORAL DAILY
Qty: 30 TABLET | Refills: 11 | Status: SHIPPED | OUTPATIENT
Start: 2018-09-17 | End: 2019-09-13 | Stop reason: SDUPTHER

## 2018-09-17 NOTE — TELEPHONE ENCOUNTER
[facility] pharmacy faxed over refill request for pt Hygroten 25mg tablet Take 1 Po qd

## 2019-04-17 ENCOUNTER — HOSPITAL ENCOUNTER (OUTPATIENT)
Dept: RADIOLOGY | Facility: HOSPITAL | Age: 84
Discharge: HOME | End: 2019-04-17
Attending: INTERNAL MEDICINE
Payer: MEDICARE

## 2019-04-17 DIAGNOSIS — Z01.818 PREOP EXAMINATION: Primary | ICD-10-CM

## 2019-04-17 DIAGNOSIS — I50.32 CHRONIC DIASTOLIC HEART FAILURE: ICD-10-CM

## 2019-04-17 DIAGNOSIS — I27.20 PHT (PULMONARY HYPERTENSION): ICD-10-CM

## 2019-04-17 DIAGNOSIS — Z01.818 PREOP EXAMINATION: ICD-10-CM

## 2019-04-17 PROCEDURE — 71046 X-RAY EXAM CHEST 2 VIEWS: CPT | Mod: TC,FY

## 2019-04-17 PROCEDURE — 71046 X-RAY EXAM CHEST 2 VIEWS: CPT | Mod: 26,,, | Performed by: RADIOLOGY

## 2019-04-17 PROCEDURE — 71046 XR CHEST PA AND LATERAL: ICD-10-PCS | Mod: 26,,, | Performed by: RADIOLOGY

## 2019-09-15 RX ORDER — CHLORTHALIDONE 25 MG/1
TABLET ORAL
Qty: 90 TABLET | Refills: 3 | Status: SHIPPED | OUTPATIENT
Start: 2019-09-15 | End: 2020-10-12

## 2019-12-31 ENCOUNTER — OFFICE VISIT (OUTPATIENT)
Dept: URGENT CARE | Facility: CLINIC | Age: 84
End: 2019-12-31
Payer: MEDICARE

## 2019-12-31 VITALS
HEIGHT: 64 IN | BODY MASS INDEX: 25.27 KG/M2 | SYSTOLIC BLOOD PRESSURE: 182 MMHG | WEIGHT: 148 LBS | DIASTOLIC BLOOD PRESSURE: 68 MMHG | TEMPERATURE: 98 F | RESPIRATION RATE: 16 BRPM | OXYGEN SATURATION: 98 % | HEART RATE: 54 BPM

## 2019-12-31 DIAGNOSIS — N39.0 URINARY TRACT INFECTION WITHOUT HEMATURIA, SITE UNSPECIFIED: Primary | ICD-10-CM

## 2019-12-31 LAB
BILIRUB UR QL STRIP: POSITIVE
GLUCOSE UR QL STRIP: POSITIVE
KETONES UR QL STRIP: NEGATIVE
LEUKOCYTE ESTERASE UR QL STRIP: POSITIVE
PH, POC UA: 7.5 (ref 5–8)
POC BLOOD, URINE: NEGATIVE
POC NITRATES, URINE: POSITIVE
PROT UR QL STRIP: NEGATIVE
SP GR UR STRIP: 1.01 (ref 1–1.03)
UROBILINOGEN UR STRIP-ACNC: 4 (ref 0.1–1.1)

## 2019-12-31 PROCEDURE — 81003 POCT URINALYSIS, DIPSTICK, AUTOMATED, W/O SCOPE: ICD-10-PCS | Mod: QW,S$GLB,, | Performed by: PHYSICIAN ASSISTANT

## 2019-12-31 PROCEDURE — 81003 URINALYSIS AUTO W/O SCOPE: CPT | Mod: QW,S$GLB,, | Performed by: PHYSICIAN ASSISTANT

## 2019-12-31 PROCEDURE — 87086 URINE CULTURE/COLONY COUNT: CPT

## 2019-12-31 PROCEDURE — 99203 OFFICE O/P NEW LOW 30 MIN: CPT | Mod: S$GLB,,, | Performed by: PHYSICIAN ASSISTANT

## 2019-12-31 PROCEDURE — 99203 PR OFFICE/OUTPT VISIT, NEW, LEVL III, 30-44 MIN: ICD-10-PCS | Mod: S$GLB,,, | Performed by: PHYSICIAN ASSISTANT

## 2019-12-31 RX ORDER — AMOXICILLIN AND CLAVULANATE POTASSIUM 875; 125 MG/1; MG/1
1 TABLET, FILM COATED ORAL 2 TIMES DAILY
Qty: 20 TABLET | Refills: 0 | Status: SHIPPED | OUTPATIENT
Start: 2019-12-31 | End: 2020-01-10

## 2019-12-31 RX ORDER — HYDROCHLOROTHIAZIDE 25 MG/1
TABLET ORAL
COMMUNITY
Start: 2019-10-17 | End: 2020-10-12

## 2019-12-31 NOTE — PATIENT INSTRUCTIONS
"  Bladder Infection, Female (Adult)    Urine is normally doesn't have any bacteria in it. But bacteria can get into the urinary tract from the skin around the rectum. Or they can travel in the blood from elsewhere in the body. Once they are in your urinary tract, they can cause infection in the urethra (urethritis), the bladder (cystitis), or the kidneys (pyelonephritis).  The most common place for an infection is in the bladder. This is called a bladder infection. This is one of the most common infections in women. Most bladder infections are easily treated. They are not serious unless the infection spreads to the kidney.  The phrases "bladder infection," "UTI," and "cystitis" are often used to describe the same thing. But they are not always the same. Cystitis is an inflammation of the bladder. The most common cause of cystitis is an infection.  Symptoms  The infection causes inflammation in the urethra and bladder. This causes many of the symptoms. The most common symptoms of a bladder infection are:  · Pain or burning when urinating  · Having to urinate more often than usual  · Urgent need to urinate  · Only a small amount of urine comes out  · Blood in urine  · Abdominal discomfort. This is usually in the lower abdomen above the pubic bone.  · Cloudy urine  · Strong- or bad-smelling urine  · Unable to urinate (urinary retention)  · Unable to hold urine in (urinary incontinence)  · Fever  · Loss of appetite  · Confusion (in older adults)  Causes  Bladder infections are not contagious. You can't get one from someone else, from a toilet seat, or from sharing a bath.  The most common cause of bladder infections is bacteria from the bowels. The bacteria get onto the skin around the opening of the urethra. From there, they can get into the urine and travel up to the bladder, causing inflammation and infection. This usually happens because of:  · Wiping improperly after urinating. Always wipe from front to " back.  · Bowel incontinence  · Pregnancy  · Procedures such as having a catheter inserted  · Older age  · Not emptying your bladder. This can allow bacteria a chance to grow in your urine.  · Dehydration  · Constipation  · Sex  · Use of a diaphragm for birth control   Treatment  Bladder infections are diagnosed by a urine test. They are treated with antibiotics and usually clear up quickly without complications. Treatment helps prevent a more serious kidney infection.  Medicines  Medicines can help in the treatment of a bladder infection:  · Take antibiotics until they are used up, even if you feel better. It is important to finish them to make sure the infection has cleared.  · You can use acetaminophen or ibuprofen for pain, fever, or discomfort, unless another medicine was prescribed. If you have chronic liver or kidney disease, talk with your healthcare provider before using these medicines. Also talk with your provider if you've ever had a stomach ulcer or gastrointestinal bleeding, or are taking blood-thinner medicines.  · If you are given phenazopydridine to reduce burning with urination, it will cause your urine to become a bright orange color. This can stain clothing.  Care and prevention  These self-care steps can help prevent future infections:  · Drink plenty of fluids to prevent dehydration and flush out your bladder. Do this unless you must restrict fluids for other health reasons, or your doctor told you not to.  · Proper cleaning after going to the bathroom is important. Wipe from front to back after using the toilet to prevent the spread of bacteria.  · Urinate more often. Don't try to hold urine in for a long time.  · Wear loose-fitting clothes and cotton underwear. Avoid tight-fitting pants.  · Improve your diet and prevent constipation. Eat more fresh fruit and vegetables, and fiber, and less junk and fatty foods.  · Avoid sex until your symptoms are gone.  · Avoid caffeine, alcohol, and spicy  foods. These can irritate your bladder.  · Urinate right after intercourse to flush out your bladder.  · If you use birth control pills and have frequent bladder infections, discuss it with your doctor.  Follow-up care  Call your healthcare provider if all symptoms are not gone after 3 days of treatment. This is especially important if you have repeat infections.  If a culture was done, you will be told if your treatment needs to be changed. If directed, you can call to find out the results.  If X-rays were done, you will be told if the results will affect your treatment.  Call 911  Call 911 if any of the following occur:  · Trouble breathing  · Hard to wake up or confusion  · Fainting or loss of consciousness  · Rapid heart rate  When to seek medical advice  Call your healthcare provider right away if any of these occur:  · Fever of 100.4ºF (38.0ºC) or higher, or as directed by your healthcare provider  · Symptoms are not better by the third day of treatment  · Back or belly (abdominal) pain that gets worse  · Repeated vomiting, or unable to keep medicine down  · Weakness or dizziness  · Vaginal discharge  · Pain, redness, or swelling in the outer vaginal area (labia)  Date Last Reviewed: 10/1/2016  © 5031-8186 [employer]. [address] [place] [address], [place] [id number]. All rights reserved. This information is not intended as a substitute for professional medical care. Always follow your healthcare professional's instructions.      Please follow up with your Primary care provider within 2-5 days if your signs and symptoms have not resolved or worsen.     If your condition worsens or fails to improve we recommend that you receive another evaluation at the emergency room immediately or contact your primary medical clinic to discuss your concerns.   You must understand that you have received an Urgent Care treatment only and that you may be released before all of your medical problems are known or treated.  You, the patient, will arrange for follow up care as instructed.     RED FLAGS/WARNING SYMPTOMS DISCUSSED WITH PATIENT THAT WOULD WARRANT EMERGENT MEDICAL ATTENTION. PATIENT VERBALIZED UNDERSTANDING.     Elevated Blood Pressure  Your blood pressure was elevated during your visit to the urgent care.  It was not so high that immediate care was needed but it is recommended that you monitor your blood pressure over the next week or two to make sure that it is not staying elevated.  Please have your blood pressure taken 2-3 times daily at different times of the day.  Write all of those blood pressures down and record the time that they were taken.  Keep all that information and take it with you to see your Primary Care Physician.  If your blood pressure is consistently above 140/90 you will need to follow up with your PCP more quickly

## 2019-12-31 NOTE — PROGRESS NOTES
"Subjective:       Patient ID: Lu White is a 87 y.o. female.    Vitals:  height is 5' 4" (1.626 m) and weight is 67.1 kg (148 lb). Her temperature is 98 °F (36.7 °C). Her blood pressure is 182/68 (abnormal) and her pulse is 54 (abnormal). Her respiration is 16 and oxygen saturation is 98%.     Chief Complaint: Urinary Tract Infection    Urinary Tract Infection    This is a new problem. Episode onset: 1 week. The problem occurs every urination. The problem has been gradually worsening. The quality of the pain is described as burning and aching. The pain is at a severity of 2/10. The pain is mild. She is not sexually active. There is no history of pyelonephritis. Associated symptoms include urgency. Pertinent negatives include no chills, frequency, hematuria, nausea, vomiting or rash. She has tried nothing for the symptoms.       Constitution: Negative for chills and fever.   Neck: Negative for painful lymph nodes.   Gastrointestinal: Negative for abdominal pain, nausea and vomiting.   Genitourinary: Positive for dysuria and urgency. Negative for frequency, urine decreased, hematuria, history of kidney stones, ovarian cysts, genital trauma, vaginal pain, vaginal discharge, vaginal bleeding, vaginal odor, genital sore and pelvic pain.   Musculoskeletal: Negative for back pain.   Skin: Negative for rash and lesion.   Hematologic/Lymphatic: Negative for swollen lymph nodes.       Objective:      Physical Exam   Constitutional: She is oriented to person, place, and time. She appears well-developed and well-nourished.   HENT:   Head: Normocephalic and atraumatic.   Right Ear: External ear normal.   Left Ear: External ear normal.   Nose: Nose normal.   Mouth/Throat: Mucous membranes are normal.   Eyes: Conjunctivae and lids are normal.   Neck: Trachea normal and full passive range of motion without pain. Neck supple.   Cardiovascular: Normal rate, regular rhythm and normal heart sounds.   Pulmonary/Chest: Effort " normal and breath sounds normal. No respiratory distress.   Abdominal: Soft. Normal appearance and bowel sounds are normal. She exhibits no distension, no abdominal bruit, no pulsatile midline mass and no mass. There is no tenderness.   Musculoskeletal: Normal range of motion. She exhibits no edema.   Neurological: She is alert and oriented to person, place, and time. She has normal strength.   Skin: Skin is warm, dry, intact, not diaphoretic and not pale.   Psychiatric: She has a normal mood and affect. Her speech is normal and behavior is normal. Judgment and thought content normal. Cognition and memory are normal.   Nursing note and vitals reviewed.        Assessment:       1. Urinary tract infection without hematuria, site unspecified        Plan:         Urinary tract infection without hematuria, site unspecified  -     POCT Urinalysis, Dipstick, Automated, W/O Scope  -     Culture, Urine  -     amoxicillin-clavulanate 875-125mg (AUGMENTIN) 875-125 mg per tablet; Take 1 tablet by mouth 2 (two) times daily. for 10 days  Dispense: 20 tablet; Refill: 0     Patient states she had resistance to multiple antibiotics last time she had a urine culture and Augmentin worked for her.  Discussed that we can attempt Augmentin is the 1st trial again.  Discussed worsening her urine off for urine culture will contact her with the results in the next 3-5 business days.  All her questions were answered she verbalized understanding.       Bladder Infection, Female (Adult)    Urine is normally doesn't have any bacteria in it. But bacteria can get into the urinary tract from the skin around the rectum. Or they can travel in the blood from elsewhere in the body. Once they are in your urinary tract, they can cause infection in the urethra (urethritis), the bladder (cystitis), or the kidneys (pyelonephritis).  The most common place for an infection is in the bladder. This is called a bladder infection. This is one of the most common  "infections in women. Most bladder infections are easily treated. They are not serious unless the infection spreads to the kidney.  The phrases "bladder infection," "UTI," and "cystitis" are often used to describe the same thing. But they are not always the same. Cystitis is an inflammation of the bladder. The most common cause of cystitis is an infection.  Symptoms  The infection causes inflammation in the urethra and bladder. This causes many of the symptoms. The most common symptoms of a bladder infection are:  · Pain or burning when urinating  · Having to urinate more often than usual  · Urgent need to urinate  · Only a small amount of urine comes out  · Blood in urine  · Abdominal discomfort. This is usually in the lower abdomen above the pubic bone.  · Cloudy urine  · Strong- or bad-smelling urine  · Unable to urinate (urinary retention)  · Unable to hold urine in (urinary incontinence)  · Fever  · Loss of appetite  · Confusion (in older adults)  Causes  Bladder infections are not contagious. You can't get one from someone else, from a toilet seat, or from sharing a bath.  The most common cause of bladder infections is bacteria from the bowels. The bacteria get onto the skin around the opening of the urethra. From there, they can get into the urine and travel up to the bladder, causing inflammation and infection. This usually happens because of:  · Wiping improperly after urinating. Always wipe from front to back.  · Bowel incontinence  · Pregnancy  · Procedures such as having a catheter inserted  · Older age  · Not emptying your bladder. This can allow bacteria a chance to grow in your urine.  · Dehydration  · Constipation  · Sex  · Use of a diaphragm for birth control   Treatment  Bladder infections are diagnosed by a urine test. They are treated with antibiotics and usually clear up quickly without complications. Treatment helps prevent a more serious kidney infection.  Medicines  Medicines can help in the " treatment of a bladder infection:  · Take antibiotics until they are used up, even if you feel better. It is important to finish them to make sure the infection has cleared.  · You can use acetaminophen or ibuprofen for pain, fever, or discomfort, unless another medicine was prescribed. If you have chronic liver or kidney disease, talk with your healthcare provider before using these medicines. Also talk with your provider if you've ever had a stomach ulcer or gastrointestinal bleeding, or are taking blood-thinner medicines.  · If you are given phenazopydridine to reduce burning with urination, it will cause your urine to become a bright orange color. This can stain clothing.  Care and prevention  These self-care steps can help prevent future infections:  · Drink plenty of fluids to prevent dehydration and flush out your bladder. Do this unless you must restrict fluids for other health reasons, or your doctor told you not to.  · Proper cleaning after going to the bathroom is important. Wipe from front to back after using the toilet to prevent the spread of bacteria.  · Urinate more often. Don't try to hold urine in for a long time.  · Wear loose-fitting clothes and cotton underwear. Avoid tight-fitting pants.  · Improve your diet and prevent constipation. Eat more fresh fruit and vegetables, and fiber, and less junk and fatty foods.  · Avoid sex until your symptoms are gone.  · Avoid caffeine, alcohol, and spicy foods. These can irritate your bladder.  · Urinate right after intercourse to flush out your bladder.  · If you use birth control pills and have frequent bladder infections, discuss it with your doctor.  Follow-up care  Call your healthcare provider if all symptoms are not gone after 3 days of treatment. This is especially important if you have repeat infections.  If a culture was done, you will be told if your treatment needs to be changed. If directed, you can call to find out the results.  If X-rays were  done, you will be told if the results will affect your treatment.  Call 911  Call 911 if any of the following occur:  · Trouble breathing  · Hard to wake up or confusion  · Fainting or loss of consciousness  · Rapid heart rate  When to seek medical advice  Call your healthcare provider right away if any of these occur:  · Fever of 100.4ºF (38.0ºC) or higher, or as directed by your healthcare provider  · Symptoms are not better by the third day of treatment  · Back or belly (abdominal) pain that gets worse  · Repeated vomiting, or unable to keep medicine down  · Weakness or dizziness  · Vaginal discharge  · Pain, redness, or swelling in the outer vaginal area (labia)  Date Last Reviewed: 10/1/2016  © 6642-5367 [employer]. [address] [place] [address], [place] [address]. All rights reserved. This information is not intended as a substitute for professional medical care. Always follow your healthcare professional's instructions.      Please follow up with your Primary care provider within 2-5 days if your signs and symptoms have not resolved or worsen.     If your condition worsens or fails to improve we recommend that you receive another evaluation at the emergency room immediately or contact your primary medical clinic to discuss your concerns.   You must understand that you have received an Urgent Care treatment only and that you may be released before all of your medical problems are known or treated. You, the patient, will arrange for follow up care as instructed.     RED FLAGS/WARNING SYMPTOMS DISCUSSED WITH PATIENT THAT WOULD WARRANT EMERGENT MEDICAL ATTENTION. PATIENT VERBALIZED UNDERSTANDING.       Elevated Blood Pressure  Your blood pressure was elevated during your visit to the urgent care.  It was not so high that immediate care was needed but it is recommended that you monitor your blood pressure over the next week or two to make sure that it is not staying elevated.  Please have your blood  pressure taken 2-3 times daily at different times of the day.  Write all of those blood pressures down and record the time that they were taken.  Keep all that information and take it with you to see your Primary Care Physician.  If your blood pressure is consistently above 140/90 you will need to follow up with your PCP more quickly

## 2020-01-02 ENCOUNTER — TELEPHONE (OUTPATIENT)
Dept: URGENT CARE | Facility: CLINIC | Age: 85
End: 2020-01-02

## 2020-01-02 LAB — BACTERIA UR CULT: NO GROWTH

## 2020-01-02 NOTE — TELEPHONE ENCOUNTER
Spoke with patient regarding urine culture.  Urine culture with no growth.  Point of care urine very dirty.  Patient began taking Augmentin and is feeling much better.  States her initial symptoms were severe.  I recommend continuing Augmentin to completion and following up with either primary care or Urology within 2 weeks.  Patient expresses understanding, all questions answered.

## 2020-10-02 PROBLEM — Z84.89 FAMILY HISTORY OF OTHER SPECIFIED CONDITIONS: Status: ACTIVE | Noted: 2017-08-21

## 2020-10-02 PROBLEM — M75.40 SUBACROMIAL IMPINGEMENT: Status: ACTIVE | Noted: 2018-01-18

## 2020-10-02 PROBLEM — M75.121 COMPLETE TEAR OF RIGHT ROTATOR CUFF: Status: ACTIVE | Noted: 2017-12-28

## 2020-10-02 PROBLEM — D69.2 SENILE PURPURA: Status: ACTIVE | Noted: 2018-06-22

## 2020-10-02 PROBLEM — M75.50 SUBACROMIAL BURSITIS: Status: ACTIVE | Noted: 2018-01-18

## 2020-10-02 PROBLEM — N18.30 STAGE 3 CHRONIC KIDNEY DISEASE: Status: ACTIVE | Noted: 2019-04-17

## 2020-10-02 PROBLEM — M25.519 SHOULDER PAIN: Status: ACTIVE | Noted: 2018-01-18

## 2020-10-02 PROBLEM — M17.11 PRIMARY OSTEOARTHRITIS OF RIGHT KNEE: Status: ACTIVE | Noted: 2017-08-04

## 2020-10-02 PROBLEM — I07.1 RHEUMATIC TRICUSPID INSUFFICIENCY: Status: ACTIVE | Noted: 2017-04-24

## 2021-01-18 ENCOUNTER — IMMUNIZATION (OUTPATIENT)
Dept: INTERNAL MEDICINE | Facility: CLINIC | Age: 86
End: 2021-01-18
Payer: MEDICARE

## 2021-01-18 DIAGNOSIS — Z23 NEED FOR VACCINATION: Primary | ICD-10-CM

## 2021-01-18 PROCEDURE — 91300 COVID-19, MRNA, LNP-S, PF, 30 MCG/0.3 ML DOSE VACCINE: CPT | Mod: PBBFAC | Performed by: FAMILY MEDICINE

## 2021-02-08 ENCOUNTER — IMMUNIZATION (OUTPATIENT)
Dept: INTERNAL MEDICINE | Facility: CLINIC | Age: 86
End: 2021-02-08
Payer: MEDICARE

## 2021-02-08 DIAGNOSIS — Z23 NEED FOR VACCINATION: Primary | ICD-10-CM

## 2021-02-08 PROCEDURE — 0002A COVID-19, MRNA, LNP-S, PF, 30 MCG/0.3 ML DOSE VACCINE: CPT | Mod: PBBFAC | Performed by: FAMILY MEDICINE

## 2021-02-08 PROCEDURE — 91300 COVID-19, MRNA, LNP-S, PF, 30 MCG/0.3 ML DOSE VACCINE: CPT | Mod: PBBFAC | Performed by: FAMILY MEDICINE

## 2021-04-20 PROBLEM — I07.9 TRICUSPID VALVE DISORDER: Status: ACTIVE | Noted: 2021-04-20

## 2021-06-09 ENCOUNTER — TELEPHONE (OUTPATIENT)
Dept: CARDIOLOGY | Facility: CLINIC | Age: 86
End: 2021-06-09

## 2021-06-09 ENCOUNTER — CLINICAL SUPPORT (OUTPATIENT)
Dept: LAB | Facility: HOSPITAL | Age: 86
End: 2021-06-09
Attending: INTERNAL MEDICINE
Payer: MEDICARE

## 2021-06-09 DIAGNOSIS — R00.1 SYMPTOMATIC BRADYCARDIA: ICD-10-CM

## 2021-06-09 PROCEDURE — 93010 ELECTROCARDIOGRAM REPORT: CPT | Mod: ,,, | Performed by: INTERNAL MEDICINE

## 2021-06-09 PROCEDURE — 93010 EKG 12-LEAD: ICD-10-PCS | Mod: ,,, | Performed by: INTERNAL MEDICINE

## 2021-06-09 PROCEDURE — 93005 ELECTROCARDIOGRAM TRACING: CPT

## 2021-06-10 ENCOUNTER — TELEPHONE (OUTPATIENT)
Dept: CARDIOLOGY | Facility: CLINIC | Age: 86
End: 2021-06-10

## 2021-06-10 ENCOUNTER — OFFICE VISIT (OUTPATIENT)
Dept: CARDIOLOGY | Facility: CLINIC | Age: 86
End: 2021-06-10
Payer: MEDICARE

## 2021-06-10 VITALS
BODY MASS INDEX: 24.13 KG/M2 | DIASTOLIC BLOOD PRESSURE: 75 MMHG | OXYGEN SATURATION: 99 % | SYSTOLIC BLOOD PRESSURE: 145 MMHG | WEIGHT: 141.31 LBS | HEIGHT: 64 IN | HEART RATE: 53 BPM

## 2021-06-10 DIAGNOSIS — R00.1 SYMPTOMATIC BRADYCARDIA: Primary | ICD-10-CM

## 2021-06-10 DIAGNOSIS — I50.32 CHRONIC DIASTOLIC HEART FAILURE: ICD-10-CM

## 2021-06-10 DIAGNOSIS — E78.5 HYPERLIPIDEMIA, UNSPECIFIED HYPERLIPIDEMIA TYPE: ICD-10-CM

## 2021-06-10 DIAGNOSIS — I07.9 TRICUSPID VALVE DISORDER: ICD-10-CM

## 2021-06-10 DIAGNOSIS — I10 ESSENTIAL HYPERTENSION: ICD-10-CM

## 2021-06-10 DIAGNOSIS — R06.02 SOBOE (SHORTNESS OF BREATH ON EXERTION): ICD-10-CM

## 2021-06-10 PROCEDURE — 99214 OFFICE O/P EST MOD 30 MIN: CPT | Mod: S$GLB,,, | Performed by: INTERNAL MEDICINE

## 2021-06-10 PROCEDURE — 99999 PR PBB SHADOW E&M-EST. PATIENT-LVL III: ICD-10-PCS | Mod: PBBFAC,,, | Performed by: INTERNAL MEDICINE

## 2021-06-10 PROCEDURE — 99214 PR OFFICE/OUTPT VISIT, EST, LEVL IV, 30-39 MIN: ICD-10-PCS | Mod: S$GLB,,, | Performed by: INTERNAL MEDICINE

## 2021-06-10 PROCEDURE — 1159F MED LIST DOCD IN RCRD: CPT | Mod: S$GLB,,, | Performed by: INTERNAL MEDICINE

## 2021-06-10 PROCEDURE — 1126F PR PAIN SEVERITY QUANTIFIED, NO PAIN PRESENT: ICD-10-PCS | Mod: S$GLB,,, | Performed by: INTERNAL MEDICINE

## 2021-06-10 PROCEDURE — 1126F AMNT PAIN NOTED NONE PRSNT: CPT | Mod: S$GLB,,, | Performed by: INTERNAL MEDICINE

## 2021-06-10 PROCEDURE — 1159F PR MEDICATION LIST DOCUMENTED IN MEDICAL RECORD: ICD-10-PCS | Mod: S$GLB,,, | Performed by: INTERNAL MEDICINE

## 2021-06-10 PROCEDURE — 99999 PR PBB SHADOW E&M-EST. PATIENT-LVL III: CPT | Mod: PBBFAC,,, | Performed by: INTERNAL MEDICINE

## 2021-06-11 ENCOUNTER — TELEPHONE (OUTPATIENT)
Dept: CARDIOLOGY | Facility: CLINIC | Age: 86
End: 2021-06-11

## 2021-06-17 ENCOUNTER — HOSPITAL ENCOUNTER (OUTPATIENT)
Dept: CARDIOLOGY | Facility: HOSPITAL | Age: 86
Discharge: HOME | End: 2021-06-17
Attending: INTERNAL MEDICINE
Payer: MEDICARE

## 2021-06-17 VITALS — BODY MASS INDEX: 24.07 KG/M2 | HEIGHT: 64 IN | WEIGHT: 141 LBS

## 2021-06-17 DIAGNOSIS — I50.32 CHRONIC DIASTOLIC HEART FAILURE: ICD-10-CM

## 2021-06-17 DIAGNOSIS — R00.1 SYMPTOMATIC BRADYCARDIA: ICD-10-CM

## 2021-06-17 LAB
AORTIC ROOT ANNULUS: 3.09 CM
ASCENDING AORTA: 2.28 CM
AV INDEX (PROSTH): 0.89
AV MEAN GRADIENT: 6 MMHG
AV PEAK GRADIENT: 11 MMHG
AV VALVE AREA: 2.72 CM2
AV VELOCITY RATIO: 0.87
BSA FOR ECHO PROCEDURE: 1.7 M2
CV ECHO LV RWT: 0.39 CM
DOP CALC AO PEAK VEL: 1.67 M/S
DOP CALC AO VTI: 35.85 CM
DOP CALC LVOT AREA: 3.1 CM2
DOP CALC LVOT DIAMETER: 1.98 CM
DOP CALC LVOT PEAK VEL: 1.45 M/S
DOP CALC LVOT STROKE VOLUME: 97.65 CM3
DOP CALCLVOT PEAK VEL VTI: 31.73 CM
E WAVE DECELERATION TIME: 289.74 MSEC
E/A RATIO: 0.87
E/E' RATIO: 12.18 M/S
ECHO LV POSTERIOR WALL: 0.91 CM (ref 0.6–1.1)
EJECTION FRACTION: 55 %
FRACTIONAL SHORTENING: 57 % (ref 28–44)
INTERVENTRICULAR SEPTUM: 0.84 CM (ref 0.6–1.1)
IVRT: 98.95 MSEC
LA MAJOR: 4.9 CM
LA MINOR: 5.6 CM
LA WIDTH: 2.5 CM
LEFT ATRIUM SIZE: 3.28 CM
LEFT ATRIUM VOLUME INDEX MOD: 33.2 ML/M2
LEFT ATRIUM VOLUME INDEX: 21.6 ML/M2
LEFT ATRIUM VOLUME MOD: 56.03 CM3
LEFT ATRIUM VOLUME: 36.43 CM3
LEFT INTERNAL DIMENSION IN SYSTOLE: 2 CM (ref 2.1–4)
LEFT VENTRICLE DIASTOLIC VOLUME INDEX: 26.79 ML/M2
LEFT VENTRICLE DIASTOLIC VOLUME: 45.27 ML
LEFT VENTRICLE MASS INDEX: 81 G/M2
LEFT VENTRICLE SYSTOLIC VOLUME INDEX: 13.8 ML/M2
LEFT VENTRICLE SYSTOLIC VOLUME: 23.37 ML
LEFT VENTRICULAR INTERNAL DIMENSION IN DIASTOLE: 4.7 CM (ref 3.5–6)
LEFT VENTRICULAR MASS: 137.47 G
LV LATERAL E/E' RATIO: 11.17 M/S
LV SEPTAL E/E' RATIO: 13.4 M/S
MV A" WAVE DURATION": 15.22 MSEC
MV PEAK A VEL: 0.77 M/S
MV PEAK E VEL: 0.67 M/S
MV PEAK GRADIENT: 3 MMHG
MV STENOSIS PRESSURE HALF TIME: 84.03 MS
MV VALVE AREA P 1/2 METHOD: 2.62 CM2
PISA MRMAX VEL: 0.05 M/S
PISA TR MAX VEL: 2.98 M/S
PULM VEIN S/D RATIO: 1.89
PV PEAK D VEL: 0.35 M/S
PV PEAK S VEL: 0.66 M/S
QEF: 55 %
RA MAJOR: 4.6 CM
RA PRESSURE: 3 MMHG
RA WIDTH: 2.9 CM
RIGHT VENTRICULAR END-DIASTOLIC DIMENSION: 2.3 CM
RIGHT VENTRICULAR LENGTH IN DIASTOLE (APICAL 4-CHAMBER VIEW): 5.2 CM
RV TISSUE DOPPLER FREE WALL SYSTOLIC VELOCITY 1 (APICAL 4 CHAMBER VIEW): 8.63 CM/S
STJ: 2.34 CM
TDI LATERAL: 0.06 M/S
TDI SEPTAL: 0.05 M/S
TDI: 0.06 M/S
TR MAX PG: 36 MMHG
TRICUSPID ANNULAR PLANE SYSTOLIC EXCURSION: 1.4 CM
TV REST PULMONARY ARTERY PRESSURE: 39 MMHG

## 2021-06-17 PROCEDURE — 93227 HOLTER MONITOR - 48 HOUR (CUPID ONLY): ICD-10-PCS | Mod: ,,, | Performed by: INTERNAL MEDICINE

## 2021-06-17 PROCEDURE — 93306 ECHO (CUPID ONLY): ICD-10-PCS | Mod: 26,,, | Performed by: INTERNAL MEDICINE

## 2021-06-17 PROCEDURE — 93306 TTE W/DOPPLER COMPLETE: CPT

## 2021-06-17 PROCEDURE — 93227 XTRNL ECG REC<48 HR R&I: CPT | Mod: ,,, | Performed by: INTERNAL MEDICINE

## 2021-06-17 PROCEDURE — 93306 TTE W/DOPPLER COMPLETE: CPT | Mod: 26,,, | Performed by: INTERNAL MEDICINE

## 2021-06-17 PROCEDURE — 93225 XTRNL ECG REC<48 HRS REC: CPT

## 2021-06-22 LAB
OHS CV EVENT MONITOR DAY: 0
OHS CV HOLTER LENGTH DECIMAL HOURS: 47.98
OHS CV HOLTER LENGTH HOURS: 47
OHS CV HOLTER LENGTH MINUTES: 59

## 2021-06-24 ENCOUNTER — OFFICE VISIT (OUTPATIENT)
Dept: CARDIOLOGY | Facility: CLINIC | Age: 86
End: 2021-06-24
Payer: MEDICARE

## 2021-06-24 VITALS
HEIGHT: 64 IN | WEIGHT: 144.19 LBS | DIASTOLIC BLOOD PRESSURE: 73 MMHG | SYSTOLIC BLOOD PRESSURE: 144 MMHG | HEART RATE: 60 BPM | BODY MASS INDEX: 24.62 KG/M2

## 2021-06-24 DIAGNOSIS — R00.1 SYMPTOMATIC BRADYCARDIA: ICD-10-CM

## 2021-06-24 DIAGNOSIS — I50.32 CHRONIC DIASTOLIC HEART FAILURE: Primary | ICD-10-CM

## 2021-06-24 DIAGNOSIS — I10 ESSENTIAL HYPERTENSION: ICD-10-CM

## 2021-06-24 DIAGNOSIS — I27.29 OTHER SECONDARY PULMONARY HYPERTENSION: ICD-10-CM

## 2021-06-24 DIAGNOSIS — I07.1 RHEUMATIC TRICUSPID INSUFFICIENCY: ICD-10-CM

## 2021-06-24 DIAGNOSIS — E78.5 HYPERLIPIDEMIA, UNSPECIFIED HYPERLIPIDEMIA TYPE: ICD-10-CM

## 2021-06-24 DIAGNOSIS — I51.89 DIASTOLIC DYSFUNCTION: ICD-10-CM

## 2021-06-24 PROCEDURE — 1159F PR MEDICATION LIST DOCUMENTED IN MEDICAL RECORD: ICD-10-PCS | Mod: S$GLB,,, | Performed by: INTERNAL MEDICINE

## 2021-06-24 PROCEDURE — 99214 PR OFFICE/OUTPT VISIT, EST, LEVL IV, 30-39 MIN: ICD-10-PCS | Mod: S$GLB,,, | Performed by: INTERNAL MEDICINE

## 2021-06-24 PROCEDURE — 3288F PR FALLS RISK ASSESSMENT DOCUMENTED: ICD-10-PCS | Mod: CPTII,S$GLB,, | Performed by: INTERNAL MEDICINE

## 2021-06-24 PROCEDURE — 99999 PR PBB SHADOW E&M-EST. PATIENT-LVL III: CPT | Mod: PBBFAC,,, | Performed by: INTERNAL MEDICINE

## 2021-06-24 PROCEDURE — 1159F MED LIST DOCD IN RCRD: CPT | Mod: S$GLB,,, | Performed by: INTERNAL MEDICINE

## 2021-06-24 PROCEDURE — 3288F FALL RISK ASSESSMENT DOCD: CPT | Mod: CPTII,S$GLB,, | Performed by: INTERNAL MEDICINE

## 2021-06-24 PROCEDURE — 1101F PT FALLS ASSESS-DOCD LE1/YR: CPT | Mod: CPTII,S$GLB,, | Performed by: INTERNAL MEDICINE

## 2021-06-24 PROCEDURE — 1126F AMNT PAIN NOTED NONE PRSNT: CPT | Mod: S$GLB,,, | Performed by: INTERNAL MEDICINE

## 2021-06-24 PROCEDURE — 99999 PR PBB SHADOW E&M-EST. PATIENT-LVL III: ICD-10-PCS | Mod: PBBFAC,,, | Performed by: INTERNAL MEDICINE

## 2021-06-24 PROCEDURE — 1101F PR PT FALLS ASSESS DOC 0-1 FALLS W/OUT INJ PAST YR: ICD-10-PCS | Mod: CPTII,S$GLB,, | Performed by: INTERNAL MEDICINE

## 2021-06-24 PROCEDURE — 99214 OFFICE O/P EST MOD 30 MIN: CPT | Mod: S$GLB,,, | Performed by: INTERNAL MEDICINE

## 2021-06-24 PROCEDURE — 1126F PR PAIN SEVERITY QUANTIFIED, NO PAIN PRESENT: ICD-10-PCS | Mod: S$GLB,,, | Performed by: INTERNAL MEDICINE

## 2021-06-24 RX ORDER — FUROSEMIDE 20 MG/1
20 TABLET ORAL DAILY
Qty: 30 TABLET | Refills: 11 | Status: SHIPPED | OUTPATIENT
Start: 2021-06-24 | End: 2021-09-23

## 2021-06-24 RX ORDER — LOSARTAN POTASSIUM 25 MG/1
25 TABLET ORAL DAILY
Qty: 90 TABLET | Refills: 3 | Status: SHIPPED | OUTPATIENT
Start: 2021-06-24 | End: 2021-09-23

## 2021-06-29 ENCOUNTER — TELEPHONE (OUTPATIENT)
Dept: CARDIOLOGY | Facility: CLINIC | Age: 86
End: 2021-06-29

## 2021-06-29 RX ORDER — HYDROCHLOROTHIAZIDE 25 MG/1
25 TABLET ORAL DAILY
COMMUNITY
End: 2021-10-04

## 2021-08-24 DIAGNOSIS — R00.1 SYMPTOMATIC BRADYCARDIA: Primary | ICD-10-CM

## 2021-09-02 ENCOUNTER — TELEPHONE (OUTPATIENT)
Dept: ELECTROPHYSIOLOGY | Facility: CLINIC | Age: 86
End: 2021-09-02

## 2021-09-21 ENCOUNTER — TELEPHONE (OUTPATIENT)
Dept: ELECTROPHYSIOLOGY | Facility: CLINIC | Age: 86
End: 2021-09-21

## 2021-09-23 ENCOUNTER — OFFICE VISIT (OUTPATIENT)
Dept: CARDIOLOGY | Facility: CLINIC | Age: 86
End: 2021-09-23
Payer: MEDICARE

## 2021-09-23 ENCOUNTER — LAB VISIT (OUTPATIENT)
Dept: LAB | Facility: HOSPITAL | Age: 86
End: 2021-09-23
Attending: INTERNAL MEDICINE
Payer: MEDICARE

## 2021-09-23 VITALS
WEIGHT: 138 LBS | HEIGHT: 63 IN | DIASTOLIC BLOOD PRESSURE: 78 MMHG | HEART RATE: 69 BPM | SYSTOLIC BLOOD PRESSURE: 159 MMHG | BODY MASS INDEX: 24.45 KG/M2

## 2021-09-23 DIAGNOSIS — I51.89 DIASTOLIC DYSFUNCTION: ICD-10-CM

## 2021-09-23 DIAGNOSIS — I49.5 SSS (SICK SINUS SYNDROME): ICD-10-CM

## 2021-09-23 DIAGNOSIS — I10 BENIGN ESSENTIAL HYPERTENSION: Primary | ICD-10-CM

## 2021-09-23 DIAGNOSIS — I10 BENIGN ESSENTIAL HYPERTENSION: ICD-10-CM

## 2021-09-23 DIAGNOSIS — R00.1 SYMPTOMATIC BRADYCARDIA: ICD-10-CM

## 2021-09-23 DIAGNOSIS — I50.32 CHRONIC DIASTOLIC HEART FAILURE: ICD-10-CM

## 2021-09-23 LAB — TSH SERPL DL<=0.005 MIU/L-ACNC: 2.1 UIU/ML (ref 0.4–4)

## 2021-09-23 PROCEDURE — 93010 RHYTHM STRIP: ICD-10-PCS | Mod: S$GLB,,, | Performed by: INTERNAL MEDICINE

## 2021-09-23 PROCEDURE — 93010 ELECTROCARDIOGRAM REPORT: CPT | Mod: S$GLB,,, | Performed by: INTERNAL MEDICINE

## 2021-09-23 PROCEDURE — 1160F PR REVIEW ALL MEDS BY PRESCRIBER/CLIN PHARMACIST DOCUMENTED: ICD-10-PCS | Mod: CPTII,S$GLB,, | Performed by: INTERNAL MEDICINE

## 2021-09-23 PROCEDURE — 99999 PR PBB SHADOW E&M-EST. PATIENT-LVL III: ICD-10-PCS | Mod: PBBFAC,,, | Performed by: INTERNAL MEDICINE

## 2021-09-23 PROCEDURE — 93005 ELECTROCARDIOGRAM TRACING: CPT | Mod: S$GLB,,, | Performed by: INTERNAL MEDICINE

## 2021-09-23 PROCEDURE — 1160F RVW MEDS BY RX/DR IN RCRD: CPT | Mod: CPTII,S$GLB,, | Performed by: INTERNAL MEDICINE

## 2021-09-23 PROCEDURE — 36415 COLL VENOUS BLD VENIPUNCTURE: CPT | Performed by: INTERNAL MEDICINE

## 2021-09-23 PROCEDURE — 99205 OFFICE O/P NEW HI 60 MIN: CPT | Mod: S$GLB,,, | Performed by: INTERNAL MEDICINE

## 2021-09-23 PROCEDURE — 1159F PR MEDICATION LIST DOCUMENTED IN MEDICAL RECORD: ICD-10-PCS | Mod: CPTII,S$GLB,, | Performed by: INTERNAL MEDICINE

## 2021-09-23 PROCEDURE — 3288F PR FALLS RISK ASSESSMENT DOCUMENTED: ICD-10-PCS | Mod: CPTII,S$GLB,, | Performed by: INTERNAL MEDICINE

## 2021-09-23 PROCEDURE — 99205 PR OFFICE/OUTPT VISIT, NEW, LEVL V, 60-74 MIN: ICD-10-PCS | Mod: S$GLB,,, | Performed by: INTERNAL MEDICINE

## 2021-09-23 PROCEDURE — 3288F FALL RISK ASSESSMENT DOCD: CPT | Mod: CPTII,S$GLB,, | Performed by: INTERNAL MEDICINE

## 2021-09-23 PROCEDURE — 84443 ASSAY THYROID STIM HORMONE: CPT | Performed by: INTERNAL MEDICINE

## 2021-09-23 PROCEDURE — 1126F AMNT PAIN NOTED NONE PRSNT: CPT | Mod: CPTII,S$GLB,, | Performed by: INTERNAL MEDICINE

## 2021-09-23 PROCEDURE — 93005 RHYTHM STRIP: ICD-10-PCS | Mod: S$GLB,,, | Performed by: INTERNAL MEDICINE

## 2021-09-23 PROCEDURE — 1101F PR PT FALLS ASSESS DOC 0-1 FALLS W/OUT INJ PAST YR: ICD-10-PCS | Mod: CPTII,S$GLB,, | Performed by: INTERNAL MEDICINE

## 2021-09-23 PROCEDURE — 99999 PR PBB SHADOW E&M-EST. PATIENT-LVL III: CPT | Mod: PBBFAC,,, | Performed by: INTERNAL MEDICINE

## 2021-09-23 PROCEDURE — 1101F PT FALLS ASSESS-DOCD LE1/YR: CPT | Mod: CPTII,S$GLB,, | Performed by: INTERNAL MEDICINE

## 2021-09-23 PROCEDURE — 1126F PR PAIN SEVERITY QUANTIFIED, NO PAIN PRESENT: ICD-10-PCS | Mod: CPTII,S$GLB,, | Performed by: INTERNAL MEDICINE

## 2021-09-23 PROCEDURE — 1159F MED LIST DOCD IN RCRD: CPT | Mod: CPTII,S$GLB,, | Performed by: INTERNAL MEDICINE

## 2021-09-30 ENCOUNTER — TELEPHONE (OUTPATIENT)
Dept: ELECTROPHYSIOLOGY | Facility: CLINIC | Age: 86
End: 2021-09-30

## 2021-09-30 DIAGNOSIS — I50.32 CHRONIC DIASTOLIC HF (HEART FAILURE): ICD-10-CM

## 2021-09-30 DIAGNOSIS — I50.42 CHRONIC COMBINED SYSTOLIC AND DIASTOLIC HEART FAILURE: ICD-10-CM

## 2021-09-30 DIAGNOSIS — I49.5 SSS (SICK SINUS SYNDROME): Primary | ICD-10-CM

## 2021-09-30 DIAGNOSIS — R00.1 SYMPTOMATIC BRADYCARDIA: ICD-10-CM

## 2021-10-04 ENCOUNTER — TELEPHONE (OUTPATIENT)
Dept: CARDIOLOGY | Facility: CLINIC | Age: 86
End: 2021-10-04

## 2021-10-04 DIAGNOSIS — R00.1 SYMPTOMATIC BRADYCARDIA: Primary | ICD-10-CM

## 2021-10-11 ENCOUNTER — TELEPHONE (OUTPATIENT)
Dept: ELECTROPHYSIOLOGY | Facility: CLINIC | Age: 86
End: 2021-10-11

## 2021-10-21 ENCOUNTER — TELEPHONE (OUTPATIENT)
Dept: FAMILY MEDICINE | Facility: CLINIC | Age: 86
End: 2021-10-21

## 2021-10-29 ENCOUNTER — LAB VISIT (OUTPATIENT)
Dept: LAB | Facility: HOSPITAL | Age: 86
End: 2021-10-29
Attending: INTERNAL MEDICINE
Payer: MEDICARE

## 2021-10-29 ENCOUNTER — TELEPHONE (OUTPATIENT)
Dept: ELECTROPHYSIOLOGY | Facility: CLINIC | Age: 86
End: 2021-10-29
Payer: MEDICARE

## 2021-10-29 DIAGNOSIS — R00.1 SYMPTOMATIC BRADYCARDIA: ICD-10-CM

## 2021-10-29 DIAGNOSIS — I50.32 CHRONIC DIASTOLIC HF (HEART FAILURE): ICD-10-CM

## 2021-10-29 DIAGNOSIS — I49.5 SSS (SICK SINUS SYNDROME): ICD-10-CM

## 2021-10-29 LAB
ANION GAP SERPL CALC-SCNC: 11 MMOL/L (ref 8–16)
APTT BLDCRRT: 29 SEC (ref 21–32)
BUN SERPL-MCNC: 24 MG/DL (ref 8–23)
CALCIUM SERPL-MCNC: 9.8 MG/DL (ref 8.7–10.5)
CHLORIDE SERPL-SCNC: 105 MMOL/L (ref 95–110)
CO2 SERPL-SCNC: 24 MMOL/L (ref 23–29)
CREAT SERPL-MCNC: 0.9 MG/DL (ref 0.5–1.4)
ERYTHROCYTE [DISTWIDTH] IN BLOOD BY AUTOMATED COUNT: 12.7 % (ref 11.5–14.5)
EST. GFR  (AFRICAN AMERICAN): >60 ML/MIN/1.73 M^2
EST. GFR  (NON AFRICAN AMERICAN): 57 ML/MIN/1.73 M^2
GLUCOSE SERPL-MCNC: 89 MG/DL (ref 70–110)
HCT VFR BLD AUTO: 37.9 % (ref 37–48.5)
HGB BLD-MCNC: 12.4 G/DL (ref 12–16)
INR PPP: 1 (ref 0.8–1.2)
MCH RBC QN AUTO: 29.8 PG (ref 27–31)
MCHC RBC AUTO-ENTMCNC: 32.7 G/DL (ref 32–36)
MCV RBC AUTO: 91 FL (ref 82–98)
PLATELET # BLD AUTO: 226 K/UL (ref 150–450)
PMV BLD AUTO: 9.8 FL (ref 9.2–12.9)
POTASSIUM SERPL-SCNC: 4 MMOL/L (ref 3.5–5.1)
PROTHROMBIN TIME: 10.8 SEC (ref 9–12.5)
RBC # BLD AUTO: 4.16 M/UL (ref 4–5.4)
SODIUM SERPL-SCNC: 140 MMOL/L (ref 136–145)
WBC # BLD AUTO: 6.25 K/UL (ref 3.9–12.7)

## 2021-10-29 PROCEDURE — 85610 PROTHROMBIN TIME: CPT | Performed by: INTERNAL MEDICINE

## 2021-10-29 PROCEDURE — 85027 COMPLETE CBC AUTOMATED: CPT | Performed by: INTERNAL MEDICINE

## 2021-10-29 PROCEDURE — 85730 THROMBOPLASTIN TIME PARTIAL: CPT | Performed by: INTERNAL MEDICINE

## 2021-10-29 PROCEDURE — 80048 BASIC METABOLIC PNL TOTAL CA: CPT | Performed by: INTERNAL MEDICINE

## 2021-10-29 PROCEDURE — 36415 COLL VENOUS BLD VENIPUNCTURE: CPT | Performed by: INTERNAL MEDICINE

## 2021-11-01 ENCOUNTER — HOSPITAL ENCOUNTER (OUTPATIENT)
Facility: HOSPITAL | Age: 86
Discharge: HOME | End: 2021-11-01
Attending: INTERNAL MEDICINE | Admitting: INTERNAL MEDICINE
Payer: MEDICARE

## 2021-11-01 ENCOUNTER — ANESTHESIA EVENT (OUTPATIENT)
Dept: MEDSURG UNIT | Facility: HOSPITAL | Age: 86
End: 2021-11-01
Payer: MEDICARE

## 2021-11-01 ENCOUNTER — ANESTHESIA (OUTPATIENT)
Dept: MEDSURG UNIT | Facility: HOSPITAL | Age: 86
End: 2021-11-01
Payer: MEDICARE

## 2021-11-01 VITALS
WEIGHT: 140 LBS | TEMPERATURE: 98 F | SYSTOLIC BLOOD PRESSURE: 173 MMHG | OXYGEN SATURATION: 98 % | BODY MASS INDEX: 23.9 KG/M2 | HEART RATE: 66 BPM | HEIGHT: 64 IN | RESPIRATION RATE: 20 BRPM | DIASTOLIC BLOOD PRESSURE: 86 MMHG

## 2021-11-01 DIAGNOSIS — R00.1 SYMPTOMATIC BRADYCARDIA: ICD-10-CM

## 2021-11-01 DIAGNOSIS — Z95.9 CARDIAC DEVICE IN SITU: ICD-10-CM

## 2021-11-01 DIAGNOSIS — I49.5 SSS (SICK SINUS SYNDROME): ICD-10-CM

## 2021-11-01 PROCEDURE — 63600175 PHARM REV CODE 636 W HCPCS: Performed by: NURSE PRACTITIONER

## 2021-11-01 PROCEDURE — 33208 INSRT HEART PM ATRIAL & VENT: CPT | Mod: KX,,, | Performed by: INTERNAL MEDICINE

## 2021-11-01 PROCEDURE — 25000003 PHARM REV CODE 250: Performed by: INTERNAL MEDICINE

## 2021-11-01 PROCEDURE — 93010 EKG 12-LEAD: ICD-10-PCS | Mod: 76,,, | Performed by: INTERNAL MEDICINE

## 2021-11-01 PROCEDURE — C1898 LEAD, PMKR, OTHER THAN TRANS: HCPCS | Performed by: INTERNAL MEDICINE

## 2021-11-01 PROCEDURE — 63600175 PHARM REV CODE 636 W HCPCS: Performed by: INTERNAL MEDICINE

## 2021-11-01 PROCEDURE — 63600175 PHARM REV CODE 636 W HCPCS: Performed by: STUDENT IN AN ORGANIZED HEALTH CARE EDUCATION/TRAINING PROGRAM

## 2021-11-01 PROCEDURE — 99220 PR INITIAL OBSERVATION CARE,LEVL III: CPT | Mod: ,,, | Performed by: INTERNAL MEDICINE

## 2021-11-01 PROCEDURE — 25500020 PHARM REV CODE 255: Performed by: STUDENT IN AN ORGANIZED HEALTH CARE EDUCATION/TRAINING PROGRAM

## 2021-11-01 PROCEDURE — 25000003 PHARM REV CODE 250: Performed by: NURSE PRACTITIONER

## 2021-11-01 PROCEDURE — 33208 INSRT HEART PM ATRIAL & VENT: CPT | Mod: KX | Performed by: INTERNAL MEDICINE

## 2021-11-01 PROCEDURE — D9220A PRA ANESTHESIA: ICD-10-PCS | Mod: CRNA,,, | Performed by: STUDENT IN AN ORGANIZED HEALTH CARE EDUCATION/TRAINING PROGRAM

## 2021-11-01 PROCEDURE — 93010 ELECTROCARDIOGRAM REPORT: CPT | Mod: 76,,, | Performed by: INTERNAL MEDICINE

## 2021-11-01 PROCEDURE — C1785 PMKR, DUAL, RATE-RESP: HCPCS | Performed by: INTERNAL MEDICINE

## 2021-11-01 PROCEDURE — D9220A PRA ANESTHESIA: ICD-10-PCS | Mod: ANES,,, | Performed by: STUDENT IN AN ORGANIZED HEALTH CARE EDUCATION/TRAINING PROGRAM

## 2021-11-01 PROCEDURE — 99220 PR INITIAL OBSERVATION CARE,LEVL III: ICD-10-PCS | Mod: ,,, | Performed by: INTERNAL MEDICINE

## 2021-11-01 PROCEDURE — C1894 INTRO/SHEATH, NON-LASER: HCPCS | Performed by: INTERNAL MEDICINE

## 2021-11-01 PROCEDURE — D9220A PRA ANESTHESIA: Mod: CRNA,,, | Performed by: STUDENT IN AN ORGANIZED HEALTH CARE EDUCATION/TRAINING PROGRAM

## 2021-11-01 PROCEDURE — 37000009 HC ANESTHESIA EA ADD 15 MINS: Performed by: INTERNAL MEDICINE

## 2021-11-01 PROCEDURE — 93005 ELECTROCARDIOGRAM TRACING: CPT

## 2021-11-01 PROCEDURE — 33208 PR INSER HART PACER XVENOUS ATR/VENTR: ICD-10-PCS | Mod: KX,,, | Performed by: INTERNAL MEDICINE

## 2021-11-01 PROCEDURE — D9220A PRA ANESTHESIA: Mod: ANES,,, | Performed by: STUDENT IN AN ORGANIZED HEALTH CARE EDUCATION/TRAINING PROGRAM

## 2021-11-01 PROCEDURE — 37000008 HC ANESTHESIA 1ST 15 MINUTES: Performed by: INTERNAL MEDICINE

## 2021-11-01 PROCEDURE — 25000003 PHARM REV CODE 250: Performed by: STUDENT IN AN ORGANIZED HEALTH CARE EDUCATION/TRAINING PROGRAM

## 2021-11-01 DEVICE — PULSE GENERATOR
Type: IMPLANTABLE DEVICE | Site: CHEST | Status: FUNCTIONAL
Brand: ASSURITY MRI™

## 2021-11-01 DEVICE — PACING LEAD
Type: IMPLANTABLE DEVICE | Site: HEART | Status: FUNCTIONAL
Brand: TENDRIL™

## 2021-11-01 RX ORDER — DOXYCYCLINE 100 MG/1
100 CAPSULE ORAL EVERY 12 HOURS
Qty: 10 CAPSULE | Refills: 0 | Status: SHIPPED | OUTPATIENT
Start: 2021-11-01 | End: 2021-11-06

## 2021-11-01 RX ORDER — BUPIVACAINE HYDROCHLORIDE 2.5 MG/ML
INJECTION, SOLUTION EPIDURAL; INFILTRATION; INTRACAUDAL
Status: DISCONTINUED | OUTPATIENT
Start: 2021-11-01 | End: 2021-11-01 | Stop reason: HOSPADM

## 2021-11-01 RX ORDER — SODIUM CHLORIDE 0.9 % (FLUSH) 0.9 %
10 SYRINGE (ML) INJECTION
Status: DISCONTINUED | OUTPATIENT
Start: 2021-11-01 | End: 2021-11-01

## 2021-11-01 RX ORDER — ACETAMINOPHEN 325 MG/1
650 TABLET ORAL EVERY 4 HOURS PRN
Status: DISCONTINUED | OUTPATIENT
Start: 2021-11-01 | End: 2021-11-01 | Stop reason: HOSPADM

## 2021-11-01 RX ORDER — FENTANYL CITRATE 50 UG/ML
25 INJECTION, SOLUTION INTRAMUSCULAR; INTRAVENOUS EVERY 5 MIN PRN
Status: DISCONTINUED | OUTPATIENT
Start: 2021-11-01 | End: 2021-11-01 | Stop reason: HOSPADM

## 2021-11-01 RX ORDER — ONDANSETRON 2 MG/ML
4 INJECTION INTRAMUSCULAR; INTRAVENOUS DAILY PRN
Status: DISCONTINUED | OUTPATIENT
Start: 2021-11-01 | End: 2021-11-01 | Stop reason: HOSPADM

## 2021-11-01 RX ORDER — PROPOFOL 10 MG/ML
VIAL (ML) INTRAVENOUS
Status: DISCONTINUED | OUTPATIENT
Start: 2021-11-01 | End: 2021-11-01

## 2021-11-01 RX ORDER — FENTANYL CITRATE 50 UG/ML
INJECTION, SOLUTION INTRAMUSCULAR; INTRAVENOUS
Status: DISCONTINUED | OUTPATIENT
Start: 2021-11-01 | End: 2021-11-01

## 2021-11-01 RX ORDER — LIDOCAINE HYDROCHLORIDE 20 MG/ML
INJECTION INTRAVENOUS
Status: DISCONTINUED | OUTPATIENT
Start: 2021-11-01 | End: 2021-11-01

## 2021-11-01 RX ORDER — PROPOFOL 10 MG/ML
VIAL (ML) INTRAVENOUS CONTINUOUS PRN
Status: DISCONTINUED | OUTPATIENT
Start: 2021-11-01 | End: 2021-11-01

## 2021-11-01 RX ORDER — VANCOMYCIN HYDROCHLORIDE 1 G/20ML
INJECTION, POWDER, LYOPHILIZED, FOR SOLUTION INTRAVENOUS
Status: DISCONTINUED | OUTPATIENT
Start: 2021-11-01 | End: 2021-11-01 | Stop reason: HOSPADM

## 2021-11-01 RX ORDER — LIDOCAINE HYDROCHLORIDE 20 MG/ML
INJECTION, SOLUTION INFILTRATION; PERINEURAL
Status: DISCONTINUED | OUTPATIENT
Start: 2021-11-01 | End: 2021-11-01 | Stop reason: HOSPADM

## 2021-11-01 RX ORDER — ONDANSETRON 2 MG/ML
INJECTION INTRAMUSCULAR; INTRAVENOUS
Status: DISCONTINUED | OUTPATIENT
Start: 2021-11-01 | End: 2021-11-01

## 2021-11-01 RX ORDER — CEFAZOLIN SODIUM 1 G/3ML
2 INJECTION, POWDER, FOR SOLUTION INTRAMUSCULAR; INTRAVENOUS
Status: COMPLETED | OUTPATIENT
Start: 2021-11-01 | End: 2021-11-01

## 2021-11-01 RX ORDER — IODIXANOL 320 MG/ML
INJECTION, SOLUTION INTRAVASCULAR
Status: DISCONTINUED | OUTPATIENT
Start: 2021-11-01 | End: 2021-11-01

## 2021-11-01 RX ORDER — SODIUM CHLORIDE 0.9 G/100ML
IRRIGANT IRRIGATION
Status: DISCONTINUED | OUTPATIENT
Start: 2021-11-01 | End: 2021-11-01 | Stop reason: HOSPADM

## 2021-11-01 RX ADMIN — PROPOFOL 30 MG: 10 INJECTION, EMULSION INTRAVENOUS at 11:11

## 2021-11-01 RX ADMIN — ACETAMINOPHEN 650 MG: 325 TABLET ORAL at 02:11

## 2021-11-01 RX ADMIN — IODIXANOL 10 ML: 320 INJECTION, SOLUTION INTRAVASCULAR at 12:11

## 2021-11-01 RX ADMIN — ONDANSETRON 4 MG: 2 INJECTION INTRAMUSCULAR; INTRAVENOUS at 12:11

## 2021-11-01 RX ADMIN — SODIUM CHLORIDE: 9 INJECTION, SOLUTION INTRAVENOUS at 11:11

## 2021-11-01 RX ADMIN — CEFAZOLIN 2 G: 330 INJECTION, POWDER, FOR SOLUTION INTRAMUSCULAR; INTRAVENOUS at 11:11

## 2021-11-01 RX ADMIN — GLYCOPYRROLATE 0.1 MG: 0.2 INJECTION, SOLUTION INTRAMUSCULAR; INTRAVITREAL at 11:11

## 2021-11-01 RX ADMIN — LIDOCAINE HYDROCHLORIDE 20 MG: 20 INJECTION, SOLUTION INTRAVENOUS at 11:11

## 2021-11-01 RX ADMIN — SODIUM CHLORIDE 0.2 MCG/KG/MIN: 9 INJECTION, SOLUTION INTRAVENOUS at 11:11

## 2021-11-01 RX ADMIN — FENTANYL CITRATE 25 MCG: 50 INJECTION INTRAMUSCULAR; INTRAVENOUS at 11:11

## 2021-11-01 RX ADMIN — SODIUM CHLORIDE 1000 ML: 0.9 INJECTION, SOLUTION INTRAVENOUS at 09:11

## 2021-11-01 RX ADMIN — PROPOFOL 100 MCG/KG/MIN: 10 INJECTION, EMULSION INTRAVENOUS at 11:11

## 2021-11-02 DIAGNOSIS — I49.5 SSS (SICK SINUS SYNDROME): Primary | ICD-10-CM

## 2021-11-08 ENCOUNTER — CLINICAL SUPPORT (OUTPATIENT)
Dept: CARDIOLOGY | Facility: HOSPITAL | Age: 86
End: 2021-11-08
Attending: INTERNAL MEDICINE
Payer: MEDICARE

## 2021-11-08 DIAGNOSIS — R00.1 SYMPTOMATIC BRADYCARDIA: ICD-10-CM

## 2021-11-08 PROCEDURE — 93280 PM DEVICE PROGR EVAL DUAL: CPT

## 2021-11-08 PROCEDURE — 93280 CARDIAC DEVICE CHECK - IN CLINIC & HOSPITAL: ICD-10-PCS | Mod: 26,,, | Performed by: INTERNAL MEDICINE

## 2021-11-08 PROCEDURE — 93280 PM DEVICE PROGR EVAL DUAL: CPT | Mod: 26,,, | Performed by: INTERNAL MEDICINE

## 2021-12-07 ENCOUNTER — TELEPHONE (OUTPATIENT)
Dept: ELECTROPHYSIOLOGY | Facility: CLINIC | Age: 86
End: 2021-12-07
Payer: MEDICARE

## 2022-01-25 PROBLEM — Z95.0 CARDIAC PACEMAKER IN SITU: Status: ACTIVE | Noted: 2022-01-25

## 2022-01-30 ENCOUNTER — CLINICAL SUPPORT (OUTPATIENT)
Dept: CARDIOLOGY | Facility: HOSPITAL | Age: 87
End: 2022-01-30
Payer: MEDICARE

## 2022-01-30 DIAGNOSIS — I49.5 SICK SINUS SYNDROME: ICD-10-CM

## 2022-01-30 DIAGNOSIS — R00.1 BRADYCARDIA, UNSPECIFIED: ICD-10-CM

## 2022-01-30 DIAGNOSIS — Z95.0 PRESENCE OF CARDIAC PACEMAKER: ICD-10-CM

## 2022-01-30 PROCEDURE — 93294 CARDIAC DEVICE CHECK - REMOTE: ICD-10-PCS | Mod: ,,, | Performed by: INTERNAL MEDICINE

## 2022-01-30 PROCEDURE — 93294 REM INTERROG EVL PM/LDLS PM: CPT | Mod: ,,, | Performed by: INTERNAL MEDICINE

## 2022-01-30 PROCEDURE — 93296 REM INTERROG EVL PM/IDS: CPT | Performed by: INTERNAL MEDICINE

## 2022-01-31 ENCOUNTER — TELEPHONE (OUTPATIENT)
Dept: ELECTROPHYSIOLOGY | Facility: CLINIC | Age: 87
End: 2022-01-31
Payer: MEDICARE

## 2022-02-07 ENCOUNTER — TELEPHONE (OUTPATIENT)
Dept: ELECTROPHYSIOLOGY | Facility: CLINIC | Age: 87
End: 2022-02-07
Payer: MEDICARE

## 2022-02-07 NOTE — TELEPHONE ENCOUNTER
Spoke with pt to confirm appointment. Pt stated that next time send a letter in the mail because she don't really be on the computer

## 2022-02-20 PROCEDURE — 12001 RPR S/N/AX/GEN/TRNK 2.5CM/<: CPT

## 2022-02-20 PROCEDURE — 12011 RPR F/E/E/N/L/M 2.5 CM/<: CPT

## 2022-02-20 PROCEDURE — 99285 EMERGENCY DEPT VISIT HI MDM: CPT | Mod: 25

## 2022-02-21 ENCOUNTER — HOSPITAL ENCOUNTER (EMERGENCY)
Facility: HOSPITAL | Age: 87
Discharge: HOME | End: 2022-02-21
Attending: EMERGENCY MEDICINE
Payer: MEDICARE

## 2022-02-21 VITALS
BODY MASS INDEX: 24.03 KG/M2 | TEMPERATURE: 99 F | SYSTOLIC BLOOD PRESSURE: 158 MMHG | WEIGHT: 140 LBS | HEART RATE: 59 BPM | RESPIRATION RATE: 14 BRPM | DIASTOLIC BLOOD PRESSURE: 71 MMHG | OXYGEN SATURATION: 99 %

## 2022-02-21 DIAGNOSIS — S00.81XA ABRASION OF FOREHEAD, INITIAL ENCOUNTER: ICD-10-CM
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
## 2022-02-21 DIAGNOSIS — S80.02XA CONTUSION OF LEFT KNEE, INITIAL ENCOUNTER: ICD-10-CM

## 2022-02-21 DIAGNOSIS — S80.211A ABRASION OF RIGHT KNEE, INITIAL ENCOUNTER: ICD-10-CM

## 2022-02-21 DIAGNOSIS — S60.512A ABRASION OF LEFT HAND, INITIAL ENCOUNTER: ICD-10-CM

## 2022-02-21 DIAGNOSIS — S09.90XA CLOSED HEAD INJURY, INITIAL ENCOUNTER: Primary | ICD-10-CM

## 2022-02-21 PROCEDURE — 12001 RPR S/N/AX/GEN/TRNK 2.5CM/<: CPT | Mod: 59

## 2022-02-21 PROCEDURE — 90715 TDAP VACCINE 7 YRS/> IM: CPT | Performed by: EMERGENCY MEDICINE

## 2022-02-21 PROCEDURE — 90471 IMMUNIZATION ADMIN: CPT | Performed by: EMERGENCY MEDICINE

## 2022-02-21 PROCEDURE — 25000003 PHARM REV CODE 250: Performed by: EMERGENCY MEDICINE

## 2022-02-21 PROCEDURE — 12011 RPR F/E/E/N/L/M 2.5 CM/<: CPT

## 2022-02-21 PROCEDURE — 63600175 PHARM REV CODE 636 W HCPCS: Performed by: EMERGENCY MEDICINE

## 2022-02-21 RX ORDER — ACETAMINOPHEN 325 MG/1
650 TABLET ORAL
Status: COMPLETED | OUTPATIENT
Start: 2022-02-21 | End: 2022-02-21

## 2022-02-21 RX ADMIN — ACETAMINOPHEN 650 MG: 325 TABLET ORAL at 12:02

## 2022-02-21 RX ADMIN — NEOMYCIN-BACITRACN ZN-POLYMYX 3.5 MG-400 UNIT-5,000 UNIT/GRAM TOP OINT: OINTMENT at 01:02

## 2022-02-21 RX ADMIN — TETANUS TOXOID, REDUCED DIPHTHERIA TOXOID AND ACELLULAR PERTUSSIS VACCINE, ADSORBED 0.5 ML: 5; 2.5; 8; 8; 2.5 SUSPENSION INTRAMUSCULAR at 01:02

## 2022-02-21 NOTE — ED NOTES
Patient identifiers for Lu White checked and correct.  LOC: The patient is awake, alert and aware of environment with an appropriate affect, the patient is oriented x 3 and speaking appropriately.  APPEARANCE: Patient uncomfortable and in no acute distress, patient is clean and well groomed, patient's clothing are properly fastened.  SKIN: Bruise to left forehead.  Small cut above left eyebrow.  Abrasion to right knee. Small cut to left hand.  Small wound to right palm.  MUSKULOSKELETAL: Patient moving all extremities well, no obvious swelling or deformities noted.  RESPIRATORY: Airway is open and patent, respirations are spontaneous, patient has a normal effort and rate.  NEUROLOGIC: PERRL, facial expression is symmetrical, bilateral hand grasp equal and even, normal sensation in all extremities when touched with a finger.

## 2022-02-21 NOTE — ED PROVIDER NOTES
Encounter Date: 2/20/2022       History     Chief Complaint   Patient presents with    Fall     Pt presents to the ER with her sun after the pt fell walking her dog. Pt reports she was walking her dog when she tripped over the side walk landing on both knees and then hitting her face. Pt reports no LOC and denies head neck or back pain other than the laceration superior the (L) eye. Pt has pain to the (L) knee.     89-year-old female brought to the emergency department after a trip and fall.  States she was walking her dog and got tripped up, falling forward, striking her knees, hands, and left side of her forehead.  Denies loss of consciousness.  Denies any headache, lightheadedness, dizziness, vision changes, focal numbness or weakness, or neck pain.  She does note some mild pain to both hands at the sites of abrasions as well as pain to her left knee, mild pain to right knee.  These pains are aching and throbbing, worse with palpation and certain movements and positions as well as with ambulation, without alleviating factors.  Unknown last tetanus shot.  No other symptoms reported at this time.        Review of patient's allergies indicates:  No Known Allergies  Past Medical History:   Diagnosis Date    Arthritis     Diverticular disease     Hyperlipidemia     Hypertension      Past Surgical History:   Procedure Laterality Date    A-V CARDIAC PACEMAKER INSERTION N/A 11/1/2021    Procedure: INSERTION, CARDIAC PACEMAKER, DUAL CHAMBER;  Surgeon: Brayden Coats MD;  Location: [facility] EP LAB;  Service: Cardiology;  Laterality: N/A;  SB, Dual PPM, SJM, MAC, DM, 3 Prep    EYE SURGERY      both eyes catarac    HYSTERECTOMY  1970    OVARY SURGERY      ROTATOR CUFF REPAIR Right     TOTAL KNEE REPLACEMENT USING COMPUTER NAVIGATION Right     TOTAL SHOULDER ARTHROPLASTY Right      Family History   Problem Relation Age of Onset    Heart disease Father      Social History     Tobacco Use    Smoking status: Never  Smoker    Smokeless tobacco: Never Used   Substance Use Topics    Alcohol use: No    Drug use: No     Review of Systems   Constitutional: Negative for chills, fatigue and fever.   HENT: Negative for congestion and sore throat.    Eyes: Negative for photophobia and visual disturbance.   Respiratory: Negative for cough and shortness of breath.    Cardiovascular: Negative for chest pain and palpitations.   Gastrointestinal: Negative for abdominal pain, diarrhea and vomiting.   Musculoskeletal: Negative for back pain, neck pain and neck stiffness.   Neurological: Negative for light-headedness, numbness and headaches.       Physical Exam     Initial Vitals [02/20/22 2233]   BP Pulse Resp Temp SpO2   (!) 197/82 60 14 98.5 °F (36.9 °C) 99 %      MAP       --         Physical Exam    Nursing note and vitals reviewed.  Constitutional: She appears well-developed and well-nourished. No distress.   HENT:   Head: Normocephalic.       Eyes: Conjunctivae and EOM are normal. Pupils are equal, round, and reactive to light.   Neck: Neck supple. No tracheal deviation present.   Normal range of motion.  Cardiovascular: Normal rate and intact distal pulses.   Pulmonary/Chest: No respiratory distress.   Musculoskeletal:         General: Tenderness present. No edema.        Hands:       Cervical back: Normal range of motion and neck supple.        Legs:      Neurological: She is alert and oriented to person, place, and time. She has normal strength. No cranial nerve deficit. GCS score is 15. GCS eye subscore is 4. GCS verbal subscore is 5. GCS motor subscore is 6.   Skin: Skin is warm and dry.         ED Course   Lac Repair    Date/Time: 2/21/2022 2:13 AM  Performed by: Homero Holt MD  Authorized by: Homero Holt MD     Consent:     Consent obtained:  Verbal    Consent given by:  Patient    Risks, benefits, and alternatives were discussed: yes    Laceration details:     Location: Left forehead, right posterior hand.     Length (cm):  1    Laceration depth: Skin tear with avulsion of skin.  Exploration:     Hemostasis achieved with:  Direct pressure    Imaging outcome: foreign body noted      Wound exploration: wound explored through full range of motion and entire depth of wound visualized      Contaminated: no    Treatment:     Area cleansed with:  Saline    Amount of cleaning:  Extensive    Irrigation method:  Syringe    Debridement:  None    Undermining:  None    Scar revision: no    Skin repair:     Repair method:  Tissue adhesive  Approximation:     Approximation:  Loose  Repair type:     Repair type:  Simple  Post-procedure details:     Dressing:  Sterile dressing      Labs Reviewed - No data to display         X-Rays:   Independently Interpreted Readings:   Other Readings:  Imaging interpreted by radiologist and visualized by me:    Imaging Results          CT Head Without Contrast (Final result)  Result time 02/21/22 01:44:16    Final result by Hero Wright MD (02/21/22 01:44:16)                 Impression:      Small supraorbital and left scalp hematoma with no underlying fracture or intracranial abnormality.    Stable involutional and chronic small vessel changes and lacunar infarct as seen on previous CT.      Electronically signed by: Hero Wright  Date:    02/21/2022  Time:    01:44             Narrative:    EXAMINATION:  CT HEAD WITHOUT CONTRAST    CLINICAL HISTORY:  Head trauma, minor (Age >= 65y);    TECHNIQUE:  Low dose axial images were obtained through the head.  Coronal and sagittal reformations were also performed. Contrast was not administered.    COMPARISON:  01/18/2018 CT    FINDINGS:  There is a small scalp hematoma over the left supraorbital region extending into the frontal bone region without fracture or contrecoup injury.    Moderate involutional changes manifested by prominence of the cortical sulcal markings, basilar cisterns and ventricular system are noted.  There is low-density in the  periventricular and deep white matter along with a more focal round area of low density just below the left basal ganglia measuring approximately 6 mm.    Mastoids and middle ears and paranasal sinuses are clear.  Calvarium is intact.  Orbits and orbital contents appear intact with bilateral ocular lens replacements.                               X-Ray Knee 1 or 2 View Left (Final result)  Result time 02/21/22 01:28:56    Final result by Hero Wright MD (02/21/22 01:28:56)                 Impression:      Osteoarthritis favoring the lateral compartment with moderate suprapatellar bursa effusion.      Electronically signed by: Hero Wright  Date:    02/21/2022  Time:    01:28             Narrative:    EXAMINATION:  XR KNEE 1 OR 2 VIEW LEFT    CLINICAL HISTORY:  Unspecified fall, initial encounter    TECHNIQUE:  PA and lateral cross-table view of the left knee were obtained.    COMPARISON:  None    FINDINGS:  Osteoarthritis is noted throughout all 3 compartments most severe in the lateral compartment with joint space narrowing and sclerosis of the articular surfaces.  Moderate suprapatellar bursa effusion is present.  No discrete fracture or subluxation is evident.                                Medications   acetaminophen tablet 650 mg (650 mg Oral Given 2/21/22 0059)   Tdap (BOOSTRIX) vaccine injection 0.5 mL (0.5 mLs Intramuscular Given 2/21/22 0100)   neomycin-bacitracin-polymyxin ointment ( Topical (Top) Given 2/21/22 0137)     Medical Decision Making:   Initial Assessment:   89-year-old female presents emergency department after a trip and fall  Differential Diagnosis:   ICH, SAH, subdural, concussion, fracture, dislocation, contusion sprain, strain  Independently Interpreted Test(s):   I have ordered and independently interpreted X-rays - see prior notes.  ED Management:  I applied skin glue to the avulsed areas of skin.  Patient tolerated these procedures well.  There were covered with Steri-Strips  afterward.  Her other abrasions were dressed with bacitracin and sterile dressing.  Patient given some Tylenol.  Also had an Ace wrap applied to her left knee by the nursing staff.  We discussed disposition including discharge with ice application, home management techniques, prompt follow-up with primary care physician, reasons return to the emergency room.  Vital signs stable, patient comfortable with discharge at this time.                       Clinical Impression:   Final diagnoses:  [W19.XXXA] Fall  [S09.90XA] Closed head injury, initial encounter (Primary)  [S80.02XA] Contusion of left knee, initial encounter  [S80.211A] Abrasion of right knee, initial encounter  [S00.81XA] Abrasion of forehead, initial encounter  [S60.512A] Abrasion of left hand, initial encounter          ED Disposition Condition    Discharge Stable        ED Prescriptions     None        Follow-up Information     Follow up With Specialties Details Why Contact Info    Vijay Carrizales MD Internal Medicine Schedule an appointment as soon as possible for a visit   [address]  [address]  [facility] [id number] [phone]             Homero Holt MD  02/21/22 3369

## 2022-02-21 NOTE — DISCHARGE INSTRUCTIONS
I recommend taking ibuprofen 600 mg every 8 hours with food and/or Tylenol 650 mg every 8 hours as needed for pain.  I recommend washing the abrasions that were not glued soapy water gently 1-2 times per day and applying an over-the-counter antibiotic ointment afterward.  Please call your primary care physician for follow-up appointment for re-evaluation.  Please return with any new or worsening symptoms.

## 2022-03-03 NOTE — PROGRESS NOTES
Ms. White is a patient of Dr. Coats and was last seen in clinic 9/23/2021.      Subjective:   Patient ID:  Lu White is an 89 y.o. female who presents for follow-up of Pacemaker Check  .     HPI:    Ms. White is an 89 y.o. female with HTN, HLD, , bradycardia s/p PPM here for follow up after pacemaker implantation.    Background:    HTN on meds  HL on meds  Chronic episodic SOB/LAM for years. No syncope/presync/LH.  Holter monitor: SR,  bpm. Junctional at times, including during symptoms during daytime hours.  echo 6/21 55%  PET stress neg  ECG is SB 45 bpm  TSH for completeness.   Plan dual chamber PPM.    Update (03/11/2022):    11/1/2021: Successful implantation of PPM Dual.    Today she says she has been feeling well. More energy. Much better since PPM implanted. No cardiac complaints. Ms. White reports no chest pain with exertion or at rest, palpitations, SOB, LAM, dizziness, or syncope.    Device Interrogation (3/11/2022) reveals an intrinsic SB with stable lead and device function. PMT noted and PVARP adjusted. No arrhythmias or treated episodes were noted.  She paces 80% in the RA and <1% in the RV. Estimated battery longevity 10.6 years.     I have personally reviewed the patient's EKG today, which shows APVS at 60bpm. IL interval is 218. QRS is 76. QT is 386.    Relevant Cardiac Test Results:    2D Echo (6/17/2021):  · The left ventricle is normal in size with normal systolic function.  · The estimated ejection fraction is 55%.  · Grade I left ventricular diastolic dysfunction.  · Normal right ventricular size with normal right ventricular systolic function.  · The estimated PA systolic pressure is 39 mmHg.  · Normal central venous pressure (3 mmHg).    Current Outpatient Medications   Medication Sig    acetaminophen (TYLENOL) 500 MG tablet Take 1,000 mg by mouth 3 (three) times daily.    aspirin 325 MG tablet Take 325 mg by mouth once daily. 1/2 tablet daily    calcium-vitamin D3 (OS-VIJAY  500 + D3) 500 mg(1,250mg) -200 unit per tablet Take 1 tablet by mouth 2 (two) times daily with meals.    ciprofloxacin HCl (CIPRO) 500 MG tablet ciprofloxacin 500 mg tablet    doxycycline (VIBRAMYCIN) 100 MG Cap doxycycline hyclate 100 mg capsule    ginkgo biloba 120 mg Tab Take 1 tablet by mouth once daily.    hydroCHLOROthiazide (HYDRODIURIL) 25 MG tablet TAKE 1 TABLET BY MOUTH ONCE DAILY IN THE MORNING FOR 90 DAYS    losartan (COZAAR) 25 MG tablet losartan 25 mg tablet    lovastatin (MEVACOR) 20 MG tablet TAKE 1 TABLET BY MOUTH ONCE DAILY WITH MEALS    multivitamin with minerals tablet Take 1 tablet by mouth once daily.    prednisoLONE acetate (PRED FORTE) 1 % DrpS Place into both eyes.    sulfamethoxazole-trimethoprim 800-160mg (BACTRIM DS) 800-160 mg Tab sulfamethoxazole 800 mg-trimethoprim 160 mg tablet    temazepam (RESTORIL) 15 mg Cap TAKE 1 CAPSULE BY MOUTH EVERYDAY AT BEDTIME    furosemide (LASIX) 20 MG tablet furosemide 20 mg tablet     No current facility-administered medications for this visit.       Review of Systems   Constitutional: Negative for malaise/fatigue.   Cardiovascular: Negative for chest pain, dyspnea on exertion, irregular heartbeat, leg swelling and palpitations.   Respiratory: Negative for shortness of breath.    Hematologic/Lymphatic: Negative for bleeding problem.   Skin: Negative for rash.   Musculoskeletal: Negative for myalgias.   Gastrointestinal: Negative for hematemesis, hematochezia and nausea.   Genitourinary: Negative for hematuria.   Neurological: Negative for light-headedness.   Psychiatric/Behavioral: Negative for altered mental status.   Allergic/Immunologic: Negative for persistent infections.       Objective:          BP (!) 149/69   Pulse 60   Wt 64.7 kg (142 lb 10.2 oz)   LMP  (LMP Unknown)   BMI 24.48 kg/m²     Physical Exam  Vitals and nursing note reviewed.   Constitutional:       Appearance: Normal appearance. She is well-developed.   HENT:       Head: Normocephalic.      Nose: Nose normal.   Eyes:      Pupils: Pupils are equal, round, and reactive to light.   Cardiovascular:      Rate and Rhythm: Normal rate and regular rhythm.   Pulmonary:      Effort: No respiratory distress.      Breath sounds: Normal breath sounds.   Chest:      Comments: Device to LUCW. Incision and pocket in good repair.    Musculoskeletal:         General: Normal range of motion.   Skin:     General: Skin is warm and dry.      Findings: No erythema.   Neurological:      Mental Status: She is alert and oriented to person, place, and time.   Psychiatric:         Speech: Speech normal.         Behavior: Behavior normal.       Lab Results   Component Value Date     10/29/2021    K 4.0 10/29/2021    BUN 24 (H) 10/29/2021    CREATININE 0.9 10/29/2021    ALT 12 10/15/2021    AST 16 10/15/2021    HGB 12.4 10/29/2021    HCT 37.9 10/29/2021    TSH 2.099 09/23/2021    LDLCALC 109 (H) 10/15/2021       Recent Labs   Lab 10/29/21  0940   INR 1.0       Assessment:     1. Cardiac pacemaker in situ    2. SSS (sick sinus syndrome)    3. Primary hypertension      Plan:     In summary, Ms. White is an 89 y.o. female with HTN, HLD, , bradycardia s/p PPM here for follow up after pacemaker implantation.  She is 4 mo s/p pacemaker implantation for symptomatic bradycardia. She is reporting significant symptom improvement since implant.  Ms. White is doing well from a device perspective with stable lead and device function. No arrhythmia noted. No RV pacing. She is doing well.    Continue current medication regimen and device settings.   Follow up in device clinic as scheduled.   Follow up in EP clinic in 1 year, sooner as needed.     *A copy of this note has been sent to Dr. Coats*    Follow up in about 1 year (around 3/11/2023).    ------------------------------------------------------------------    MAC Valdez, NP-C  Cardiac Electrophysiology

## 2022-03-10 ENCOUNTER — TELEPHONE (OUTPATIENT)
Dept: ELECTROPHYSIOLOGY | Facility: CLINIC | Age: 87
End: 2022-03-10
Payer: MEDICARE

## 2022-03-11 ENCOUNTER — CLINICAL SUPPORT (OUTPATIENT)
Dept: CARDIOLOGY | Facility: HOSPITAL | Age: 87
End: 2022-03-11
Attending: INTERNAL MEDICINE
Payer: MEDICARE

## 2022-03-11 ENCOUNTER — OFFICE VISIT (OUTPATIENT)
Dept: ELECTROPHYSIOLOGY | Facility: CLINIC | Age: 87
End: 2022-03-11
Payer: MEDICARE

## 2022-03-11 ENCOUNTER — HOSPITAL ENCOUNTER (OUTPATIENT)
Dept: CARDIOLOGY | Facility: CLINIC | Age: 87
Discharge: HOME | End: 2022-03-11
Payer: MEDICARE

## 2022-03-11 VITALS
SYSTOLIC BLOOD PRESSURE: 149 MMHG | BODY MASS INDEX: 24.48 KG/M2 | WEIGHT: 142.63 LBS | DIASTOLIC BLOOD PRESSURE: 69 MMHG | HEART RATE: 60 BPM

## 2022-03-11 DIAGNOSIS — I10 PRIMARY HYPERTENSION: ICD-10-CM

## 2022-03-11 DIAGNOSIS — I49.5 SSS (SICK SINUS SYNDROME): ICD-10-CM

## 2022-03-11 DIAGNOSIS — R00.1 SYMPTOMATIC BRADYCARDIA: ICD-10-CM

## 2022-03-11 DIAGNOSIS — Z95.0 CARDIAC PACEMAKER IN SITU: Primary | ICD-10-CM

## 2022-03-11 PROCEDURE — 93280 PM DEVICE PROGR EVAL DUAL: CPT

## 2022-03-11 PROCEDURE — 1159F PR MEDICATION LIST DOCUMENTED IN MEDICAL RECORD: ICD-10-PCS | Mod: CPTII,S$GLB,, | Performed by: NURSE PRACTITIONER

## 2022-03-11 PROCEDURE — 3288F FALL RISK ASSESSMENT DOCD: CPT | Mod: CPTII,S$GLB,, | Performed by: NURSE PRACTITIONER

## 2022-03-11 PROCEDURE — 93280 PM DEVICE PROGR EVAL DUAL: CPT | Mod: 26,,, | Performed by: INTERNAL MEDICINE

## 2022-03-11 PROCEDURE — 1160F RVW MEDS BY RX/DR IN RCRD: CPT | Mod: CPTII,S$GLB,, | Performed by: NURSE PRACTITIONER

## 2022-03-11 PROCEDURE — 1160F PR REVIEW ALL MEDS BY PRESCRIBER/CLIN PHARMACIST DOCUMENTED: ICD-10-PCS | Mod: CPTII,S$GLB,, | Performed by: NURSE PRACTITIONER

## 2022-03-11 PROCEDURE — 99999 PR PBB SHADOW E&M-EST. PATIENT-LVL III: ICD-10-PCS | Mod: PBBFAC,,, | Performed by: NURSE PRACTITIONER

## 2022-03-11 PROCEDURE — 1126F AMNT PAIN NOTED NONE PRSNT: CPT | Mod: CPTII,S$GLB,, | Performed by: NURSE PRACTITIONER

## 2022-03-11 PROCEDURE — 1101F PR PT FALLS ASSESS DOC 0-1 FALLS W/OUT INJ PAST YR: ICD-10-PCS | Mod: CPTII,S$GLB,, | Performed by: NURSE PRACTITIONER

## 2022-03-11 PROCEDURE — 3288F PR FALLS RISK ASSESSMENT DOCUMENTED: ICD-10-PCS | Mod: CPTII,S$GLB,, | Performed by: NURSE PRACTITIONER

## 2022-03-11 PROCEDURE — 1101F PT FALLS ASSESS-DOCD LE1/YR: CPT | Mod: CPTII,S$GLB,, | Performed by: NURSE PRACTITIONER

## 2022-03-11 PROCEDURE — 93010 RHYTHM STRIP: ICD-10-PCS | Mod: S$GLB,,, | Performed by: INTERNAL MEDICINE

## 2022-03-11 PROCEDURE — 93005 RHYTHM STRIP: ICD-10-PCS | Mod: S$GLB,,, | Performed by: INTERNAL MEDICINE

## 2022-03-11 PROCEDURE — 99214 PR OFFICE/OUTPT VISIT, EST, LEVL IV, 30-39 MIN: ICD-10-PCS | Mod: S$GLB,,, | Performed by: NURSE PRACTITIONER

## 2022-03-11 PROCEDURE — 93010 ELECTROCARDIOGRAM REPORT: CPT | Mod: S$GLB,,, | Performed by: INTERNAL MEDICINE

## 2022-03-11 PROCEDURE — 1126F PR PAIN SEVERITY QUANTIFIED, NO PAIN PRESENT: ICD-10-PCS | Mod: CPTII,S$GLB,, | Performed by: NURSE PRACTITIONER

## 2022-03-11 PROCEDURE — 99999 PR PBB SHADOW E&M-EST. PATIENT-LVL III: CPT | Mod: PBBFAC,,, | Performed by: NURSE PRACTITIONER

## 2022-03-11 PROCEDURE — 1159F MED LIST DOCD IN RCRD: CPT | Mod: CPTII,S$GLB,, | Performed by: NURSE PRACTITIONER

## 2022-03-11 PROCEDURE — 93280 CARDIAC DEVICE CHECK - IN CLINIC & HOSPITAL: ICD-10-PCS | Mod: 26,,, | Performed by: INTERNAL MEDICINE

## 2022-03-11 PROCEDURE — 93005 ELECTROCARDIOGRAM TRACING: CPT | Mod: S$GLB,,, | Performed by: INTERNAL MEDICINE

## 2022-03-11 PROCEDURE — 99214 OFFICE O/P EST MOD 30 MIN: CPT | Mod: S$GLB,,, | Performed by: NURSE PRACTITIONER

## 2022-03-11 RX ORDER — SULFAMETHOXAZOLE AND TRIMETHOPRIM 800; 160 MG/1; MG/1
TABLET ORAL
COMMUNITY
End: 2022-03-21

## 2022-03-11 RX ORDER — DOXYCYCLINE 100 MG/1
CAPSULE ORAL
COMMUNITY
End: 2022-03-21

## 2022-03-11 RX ORDER — PREDNISOLONE ACETATE 10 MG/ML
SUSPENSION/ DROPS OPHTHALMIC
COMMUNITY
Start: 2022-02-17 | End: 2022-09-22

## 2022-03-11 RX ORDER — FUROSEMIDE 20 MG/1
TABLET ORAL
COMMUNITY
End: 2022-03-21

## 2022-03-11 RX ORDER — CIPROFLOXACIN 500 MG/1
TABLET ORAL
COMMUNITY
End: 2022-03-21

## 2022-03-11 RX ORDER — LOSARTAN POTASSIUM 25 MG/1
TABLET ORAL
COMMUNITY
End: 2022-03-21

## 2022-03-21 ENCOUNTER — HOSPITAL ENCOUNTER (OUTPATIENT)
Dept: RADIOLOGY | Facility: HOSPITAL | Age: 87
Discharge: HOME | End: 2022-03-21
Attending: INTERNAL MEDICINE
Payer: MEDICARE

## 2022-03-21 DIAGNOSIS — I50.32 CHRONIC DIASTOLIC HEART FAILURE: ICD-10-CM

## 2022-03-21 DIAGNOSIS — I10 PRIMARY HYPERTENSION: ICD-10-CM

## 2022-03-21 PROCEDURE — 71046 X-RAY EXAM CHEST 2 VIEWS: CPT | Mod: 26,,, | Performed by: INTERNAL MEDICINE

## 2022-03-21 PROCEDURE — 71046 XR CHEST PA AND LATERAL: ICD-10-PCS | Mod: 26,,, | Performed by: INTERNAL MEDICINE

## 2022-03-21 PROCEDURE — 71046 X-RAY EXAM CHEST 2 VIEWS: CPT | Mod: TC,FY

## 2022-04-04 ENCOUNTER — OFFICE VISIT (OUTPATIENT)
Dept: CARDIOLOGY | Facility: CLINIC | Age: 87
End: 2022-04-04
Payer: MEDICARE

## 2022-04-04 ENCOUNTER — TELEPHONE (OUTPATIENT)
Dept: CARDIOLOGY | Facility: CLINIC | Age: 87
End: 2022-04-04

## 2022-04-04 VITALS
HEIGHT: 64 IN | HEART RATE: 71 BPM | WEIGHT: 140.63 LBS | DIASTOLIC BLOOD PRESSURE: 70 MMHG | OXYGEN SATURATION: 95 % | SYSTOLIC BLOOD PRESSURE: 140 MMHG | BODY MASS INDEX: 24.01 KG/M2

## 2022-04-04 DIAGNOSIS — Z95.0 CARDIAC PACEMAKER IN SITU: ICD-10-CM

## 2022-04-04 DIAGNOSIS — I10 PRIMARY HYPERTENSION: Primary | ICD-10-CM

## 2022-04-04 DIAGNOSIS — I49.5 SSS (SICK SINUS SYNDROME): ICD-10-CM

## 2022-04-04 DIAGNOSIS — E78.5 HYPERLIPIDEMIA, UNSPECIFIED HYPERLIPIDEMIA TYPE: ICD-10-CM

## 2022-04-04 DIAGNOSIS — I50.32 CHRONIC DIASTOLIC HEART FAILURE: ICD-10-CM

## 2022-04-04 PROCEDURE — 1126F PR PAIN SEVERITY QUANTIFIED, NO PAIN PRESENT: ICD-10-PCS | Mod: CPTII,S$GLB,, | Performed by: INTERNAL MEDICINE

## 2022-04-04 PROCEDURE — 99999 PR PBB SHADOW E&M-EST. PATIENT-LVL III: ICD-10-PCS | Mod: PBBFAC,,, | Performed by: INTERNAL MEDICINE

## 2022-04-04 PROCEDURE — 1126F AMNT PAIN NOTED NONE PRSNT: CPT | Mod: CPTII,S$GLB,, | Performed by: INTERNAL MEDICINE

## 2022-04-04 PROCEDURE — 99214 OFFICE O/P EST MOD 30 MIN: CPT | Mod: S$GLB,,, | Performed by: INTERNAL MEDICINE

## 2022-04-04 PROCEDURE — 99999 PR PBB SHADOW E&M-EST. PATIENT-LVL III: CPT | Mod: PBBFAC,,, | Performed by: INTERNAL MEDICINE

## 2022-04-04 PROCEDURE — 99214 PR OFFICE/OUTPT VISIT, EST, LEVL IV, 30-39 MIN: ICD-10-PCS | Mod: S$GLB,,, | Performed by: INTERNAL MEDICINE

## 2022-04-04 RX ORDER — CELECOXIB 200 MG/1
200 CAPSULE ORAL 2 TIMES DAILY
COMMUNITY
Start: 2022-03-23 | End: 2022-09-22

## 2022-04-04 RX ORDER — GABAPENTIN 300 MG/1
CAPSULE ORAL
COMMUNITY
End: 2022-09-22

## 2022-04-04 RX ORDER — OXYCODONE HYDROCHLORIDE 5 MG/1
TABLET ORAL
COMMUNITY
End: 2022-09-22

## 2022-04-04 RX ORDER — OMEPRAZOLE 20 MG/1
20 CAPSULE, DELAYED RELEASE ORAL DAILY
COMMUNITY
Start: 2022-03-23 | End: 2022-09-22

## 2022-04-04 RX ORDER — MINOCYCLINE HYDROCHLORIDE 100 MG/1
CAPSULE ORAL
COMMUNITY
End: 2022-09-22

## 2022-04-04 RX ORDER — SULFAMETHOXAZOLE AND TRIMETHOPRIM 800; 160 MG/1; MG/1
TABLET ORAL
COMMUNITY
End: 2022-09-22

## 2022-04-04 NOTE — TELEPHONE ENCOUNTER
----- Message from Mery Baxter sent at 4/4/2022  1:40 PM CDT -----  Contact: Ofelia herrera/ [facility] [phone]  Type: Requesting to speak with nurse    Who Called: Ofelia   Regarding: requesting clearance be fax over   Would the patient rather a call back or a response via [employer]? Call back  Best Call Back Number: [phone]  Additional Information: [phone]

## 2022-04-04 NOTE — PROGRESS NOTES
Subjective:   @Patient ID:  Lu White is a 89 y.o. female who presents for evaluation of shortness of breath       HPI:   Here for f/u   She is feeling much better after the PPM placed.   No more shortness of breath   She is fairly active. Still does house works, shopping with no issues at all  No chest pain, no palpitations. No syncope.    Plan to have knee surgery   No CVA, No CKD.     EKG 3/2022 .  Atrial paced, no ischemic changes.       She was referred by Dr. Carrizales      PMH: TR, HTN, HLP.     Prior cardiovascular  Hx  --------------------------------       - PET stress 9/5/2017   NO CLINICALLY SIGNIFICANT STRESS INDUCED PERFUSION DEFECTS as assessed with PET perfusion imaging.   1. There is no evidence of a discrete hemodynamically significant coronary stenosis.   2. Although no clinically significant stress defect is seen, there is a proximal to distal longitudinal perfusion gradient. These results suggest mild endothelial dysfunction due to mild, diffuse, non-obstructive coronary atherosclerosis without clinically significant, localized perfusion defects.   3. Resting wall motion is physiologic. Stress wall motion is physiologic.   4. LV function is normal at rest and stress.  (normal is >= 51%)   5. The ventricular volumes are normal at rest and stress.   6. The extracardiac distribution of radioactivity is normal.   7. There was no previous study available to compare.        - Echo 6/17/2021  · The left ventricle is normal in size with normal systolic function.  · The estimated ejection fraction is 55%.  · Grade I left ventricular diastolic dysfunction.  · Normal right ventricular size with normal right ventricular systolic function.  · Mild to moderate TR.  · The estimated PA systolic pressure is 39 mmHg.  · Normal central venous pressure (3 mmHg).       - ECHO 8/30/2017    1 - Normal left ventricular systolic function (EF 60-65%).     2 - Mild left atrial enlargement.     3 - Impaired LV  relaxation, normal LAP (grade 1 diastolic dysfunction).     4 - Normal right ventricular systolic function .     5 - Mild to moderate tricuspid regurgitation.     6 - The estimated PA systolic pressure is 30 mmHg.     - EKG 6/9/20121 sinus bradycardia, PACs, no acute St-T changes, U wave       - Holter monitor 6/17/2021  · Predominant Rhythm Sinus rhythm with heart rates varying between 31 and 101 bpm with an average of 49 bpm.  · There were very rare PVCs  · There were very frequent PACs  · There were 2 runs EAT and lasting for 3 to 10 beats. The rate varied between 130 and 146 bpm.  · Reported symptoms correlated with Sinus rhthm, sinus bradycardia. Two reported symptoms correlated with junctional rhythm.                Patient Active Problem List    Diagnosis Date Noted    Cardiac pacemaker in situ 01/25/2022    SSS (sick sinus syndrome) 09/23/2021    Tricuspid valve disorder 04/20/2021    Stage 3 chronic kidney disease 04/17/2019    Senile purpura 06/22/2018    Shoulder pain 01/18/2018    Subacromial bursitis 01/18/2018    Subacromial impingement 01/18/2018    Complete tear of right rotator cuff 12/28/2017    Low oxygen saturation 10/17/2017    Diastolic dysfunction 09/22/2017     Mild relaxation abnormality      Family history of other specified conditions 08/21/2017    Primary osteoarthritis of right knee 08/04/2017    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    Symptomatic bradycardia 06/16/2016    Chronic diastolic heart failure 06/16/2016    Other secondary pulmonary hypertension 06/16/2016    Diverticular disease     Insomnia 08/25/2014    HTN (hypertension) 08/25/2014    HLD (hyperlipidemia) 08/25/2014    SOBOE (shortness of breath on exertion) 08/25/2014                    LAST HbA1c  No results found for: HGBA1C    Lipid panel  Lab Results   Component Value Date    CHOL 190 10/15/2021    CHOL 195 08/04/2020    CHOL 214 (H) 05/18/2018     Lab Results   Component Value Date     HDL 62 10/15/2021    HDL 61 08/04/2020    HDL 63 05/18/2018     Lab Results   Component Value Date    LDLCALC 109 (H) 10/15/2021    LDLCALC 117 (H) 08/04/2020    LDLCALC 133.2 05/18/2018     Lab Results   Component Value Date    TRIG 91 10/15/2021    TRIG 71 08/04/2020    TRIG 89 05/18/2018     Lab Results   Component Value Date    CHOLHDL 3.1 10/15/2021    CHOLHDL 3.2 08/04/2020    CHOLHDL 29.4 05/18/2018            Review of Systems   Constitutional: Negative for chills and fever.   HENT: Negative for hearing loss and nosebleeds.    Eyes: Negative for blurred vision.   Cardiovascular: Negative for chest pain, dyspnea on exertion, leg swelling and palpitations.   Respiratory: Negative for hemoptysis and shortness of breath.    Hematologic/Lymphatic: Negative for bleeding problem.   Skin: Negative for itching.   Musculoskeletal: Negative for falls.   Gastrointestinal: Negative for abdominal pain and hematochezia.   Genitourinary: Negative for hematuria.   Neurological: Negative for dizziness and loss of balance.   Psychiatric/Behavioral: Negative for altered mental status and depression.       Objective:   Physical Exam  Constitutional:       Appearance: She is well-developed.   HENT:      Head: Normocephalic and atraumatic.   Eyes:      Conjunctiva/sclera: Conjunctivae normal.   Neck:      Vascular: No carotid bruit or JVD.   Cardiovascular:      Rate and Rhythm: Normal rate and regular rhythm.      Pulses:           Carotid pulses are 2+ on the right side and 2+ on the left side.       Radial pulses are 2+ on the right side and 2+ on the left side.      Heart sounds: Normal heart sounds. No murmur heard.    No friction rub. No gallop.   Pulmonary:      Effort: Pulmonary effort is normal. No respiratory distress.      Breath sounds: Normal breath sounds. No stridor. No wheezing.   Musculoskeletal:      Cervical back: Neck supple.   Skin:     General: Skin is warm and dry.   Neurological:      Mental Status: She is  alert and oriented to person, place, and time.   Psychiatric:         Behavior: Behavior normal.         Assessment:     1. Primary hypertension    2. Chronic diastolic heart failure    3. Hyperlipidemia, unspecified hyperlipidemia type    4. SSS (sick sinus syndrome)    5. Cardiac pacemaker in situ        Plan:   Based on her clinical risk factors she is class II risk 0.9% risk for major cardiac events.  definitely her age putting her at little higher risk. No further cardiac work up warranted. OK to proceed with planned surgery. Patient accepts the risks.     Pt has no active cardiac condition (ACS/USA, decompenstated CHF, significant arrhythmias or severe valvular disease). Patient has acceptable risk for moderate risk non cardiovascular surgery. No further cardiac work up required prior to  undergoing the surgical procedure.  These recommendations follow the 2014 ACC/AHA Guideline on Perioperative Cardiovascular Evaluation and Management of Patients Undergoing Noncardiac Surgery. (JACC Vol. 64 No. 22, Dec 9, 2014, pp n59-b615).    F/U with EP as scheduled.     1 year f/u               Pertinent cardiac images and EKG reviewed independently.    Continue with current medical plan and lifestyle changes.  Return sooner for concerns or questions. If symptoms persist go to the ED  I have reviewed all pertinent data including patient's medical history in detail and updated the computerized patient record.     No orders of the defined types were placed in this encounter.      Follow up as scheduled.     She expressed verbal understanding and agreed with the plan    Patient's Medications   New Prescriptions    No medications on file   Previous Medications    ACETAMINOPHEN (TYLENOL) 500 MG TABLET    Take 1,000 mg by mouth 3 (three) times daily.    ASPIRIN 325 MG TABLET    Take 325 mg by mouth once daily. 1/2 tablet daily    CALCIUM-VITAMIN D3 (OS-VIJAY 500 + D3) 500 MG(1,250MG) -200 UNIT PER TABLET    Take 1 tablet by mouth  2 (two) times daily with meals.    CELECOXIB (CELEBREX) 200 MG CAPSULE    Take 200 mg by mouth 2 (two) times daily.    GABAPENTIN (NEURONTIN) 300 MG CAPSULE    gabapentin 300 mg capsule   TAKE 1 CAPSULE BY MOUTH THE NIGHT BEFORE SURGERY THEN 1 THREE TIMES DAILY THEREAFTER    GINKGO BILOBA 120 MG TAB    Take 1 tablet by mouth once daily.    HYDROCHLOROTHIAZIDE (HYDRODIURIL) 25 MG TABLET    TAKE 1 TABLET BY MOUTH ONCE DAILY IN THE MORNING FOR 90 DAYS    LOVASTATIN (MEVACOR) 20 MG TABLET    TAKE 1 TABLET BY MOUTH ONCE DAILY WITH MEALS    MINOCYCLINE (MINOCIN,DYNACIN) 100 MG CAPSULE    minocycline 100 mg capsule   TAKE 1 CAPSULE BY MOUTH EVERY 12 HOURS FOR 3 DAYS BEGINNING 3 DAYS PRIOR TO SURGERY    MULTIVITAMIN WITH MINERALS TABLET    Take 1 tablet by mouth once daily.    OMEPRAZOLE (PRILOSEC) 20 MG CAPSULE    Take 20 mg by mouth once daily.    OXYCODONE (ROXICODONE) 5 MG IMMEDIATE RELEASE TABLET    oxycodone 5 mg tablet   TAKE 1 TABLET BY MOUTH EVERY 4 HOURS AS NEEDED FOR BREAKTHROUGH PAIN    PREDNISOLONE ACETATE (PRED FORTE) 1 % DRPS    Place into both eyes.    SULFAMETHOXAZOLE-TRIMETHOPRIM 800-160MG (BACTRIM DS) 800-160 MG TAB    sulfamethoxazole 800 mg-trimethoprim 160 mg tablet   TAKE 1 TABLET BY MOUTH EVERY 12 HOURS FOR 1 DAY STARTING THE DAY BEFORE SURGERY    TEMAZEPAM (RESTORIL) 15 MG CAP    TAKE 1 CAPSULE BY MOUTH EVERYDAY AT BEDTIME   Modified Medications    No medications on file   Discontinued Medications    No medications on file

## 2022-04-14 DIAGNOSIS — M17.12 UNILATERAL PRIMARY OSTEOARTHRITIS, LEFT KNEE: Primary | ICD-10-CM

## 2022-04-25 ENCOUNTER — CLINICAL SUPPORT (OUTPATIENT)
Dept: REHABILITATION | Facility: HOSPITAL | Age: 87
End: 2022-04-25
Payer: MEDICARE

## 2022-04-25 DIAGNOSIS — R26.2 DIFFICULTY WALKING: Primary | ICD-10-CM

## 2022-04-25 DIAGNOSIS — M25.662 DECREASED ROM OF LEFT KNEE: ICD-10-CM

## 2022-04-25 PROCEDURE — 97530 THERAPEUTIC ACTIVITIES: CPT | Mod: PN

## 2022-04-25 PROCEDURE — 97161 PT EVAL LOW COMPLEX 20 MIN: CPT | Mod: PN

## 2022-04-25 NOTE — PLAN OF CARE
"[facility]   Physical Therapy Initial Evaluation     Date: 4/25/2022   Name: Lu White  Clinic Number: [id number]    Therapy Diagnosis:   Encounter Diagnoses   Name Primary?    Difficulty walking Yes    Decreased ROM of left knee      Physician: Adam Garcia, CELIA    Physician Orders: PT Eval and Treat  Medical Diagnosis from Referral: M17.12 (ICD-10-CM) - Unilateral primary osteoarthritis, left knee  Evaluation Date: 4/25/2022  Authorization Period Expiration: 5/23/2023  Plan of Care Expiration: 7/2/2022  Visit # / Visits authorized: 1/ 12   FOTO: 1/ 5     Precautions: Standard and pacemaker    Time In: 13:00  Time Out: 13:45  Total Appointment Time (timed & untimed codes): 45 minutes      SUBJECTIVE   Date of Surgery: 4/7/2022    History of current condition - Lu reports: s/p Left TKA performed on 4/7/2022. Received home health following the surgery. Not using any assistive device. Pain is very mild and worse in the evening.     Falls: None    Imaging, x-ray (2/21/2022): Osteoarthritis favoring the lateral compartment with moderate suprapatellar bursa effusion.    Prior Therapy: None  Social History: 12 steps; lives with their daughter  Occupation: retired  Prior Level of Function: mild to no pain and difficulties  Current Level of Function: moderate pain and difficulties    Pain:  Current 3/10, worst 7/10, best 1/10   Location: Left Knee  Description: Aching and Dull  Aggravating Factors: Standing, Walking, Night Time, Extension, Flexing and Getting out of bed/chair  Easing Factors: pain medication, ice, rest and elevation    Patients goals: "To get back to my walking program."     Medical History:   Past Medical History:   Diagnosis Date    Arthritis     Diverticular disease     Hyperlipidemia     Hypertension      Surgical History:   Lu White  has a past surgical history that includes Hysterectomy (1970); Eye surgery; Ovary surgery; Rotator cuff repair " (Right); Total knee replacement using computer navigation (Right); Total shoulder arthroplasty (Right); and A-V cardiac pacemaker insertion (N/A, 11/1/2021).    Medications:   Lu has a current medication list which includes the following prescription(s): acetaminophen, aspirin, calcium-vitamin d3, celecoxib, gabapentin, ginkgo biloba, hydrochlorothiazide, lovastatin, minocycline, multivitamin with minerals, omeprazole, oxycodone, prednisolone acetate, sulfamethoxazole-trimethoprim 800-160mg, and temazepam.    Allergies:   Review of patient's allergies indicates:  No Known Allergies     OBJECTIVE     Posture:   Lumbar: decreased lordosis    Gait:   Non-antalgic   Right compensated Trendelenburg   No use of assistive device   Decreased heel strike.    AROM:  Knee   Action Left Right   Flexion 96 degrees 105 degrees   Extension 0 degrees +1 degrees     PROM:  Knee   Action Left Right   Flexion 102 degrees 113 degrees   Extension +1 degrees +2 degrees     Manual Muscle Testing:  Lower Extremity   Action Left Right   Hip Flexion 9.7 kg 11.6 kg   Hip Extension not tested not tested   Hip Abduction 12.2 kg 12.7 kg   Knee Flexion 9.6 kg 13.6 kg   Knee Extension 11.2 kg 20.6 kg     Special Tests:   30 Second Sit to Stand = 8 reps (with bilateral upper extremity use and increased weight through Right lower extremity)   Timed Up and Go = 13 seconds (upper extremity assistance and no assistive device)    Balance:   Double Limb Stance:  o Eyes Open / Firm = 60 seconds; non-antalgic  o Eyes Closed / Firm = 60 seconds; non-antalgic; minimal sway    Palpation:   Mild soreness to palpation   Normal patellar mobility.      Limitation/Restriction for FOTO Knee Survey    Therapist reviewed FOTO scores for Lu White on 4/25/2022.   FOTO documents entered into EPIC - see Media section.    Limitation Score: 67% > 46% (16 visits)         TREATMENT     Total Treatment time (time-based codes) separate from Evaluation: 0  minutes      Lu received the treatments listed below:      Therapeutic Exercises to develop strength, endurance, ROM and flexibility for 0 minutes including:  Heel Slides  Short arc quad  Straight Leg Raise  Sidelying hip abduction  Long arc quad  Sit to stands  Heel Raises  Standing Hip abduction  Standing hip extension    Therapeutic Activities to improve functional performance for 10 minutes, including:  Education (see below)  Questions and answers    PATIENT EDUCATION AND HOME EXERCISES     Education provided:   Anatomy and Pathology.  Symptom management and plan of care progressions.  Home Exercise Program.  Total Knee Education Handout    Written Home Exercises Provided: yes. Exercises were reviewed and Lu was able to demonstrate them prior to the end of the session.  Lu demonstrated good  understanding of the education provided. See EMR under Patient Instructions for exercises provided during therapy sessions.    ASSESSMENT     Lu is a 89 y.o. female referred to outpatient Physical Therapy with a medical diagnosis of Unilateral primary osteoarthritis, left knee. She is status post Left TKA performed on 4/7/2022. Patient presents with decreased range of motion, decreased strength, and gait deviations. She will benefit from skilled physical therapy to improve her joint mobility, lower extremity strength, gross proprioception, and restoration of normal movement mechanics for safe and effective return to home and community function.    Patient prognosis is Good.   Patientt will benefit from skilled outpatient Physical Therapy to address the deficits stated above and in the chart below, provide patient /family education, and to maximize patientt's level of independence.     Plan of care discussed with patient: Yes  Patient's spiritual, cultural and educational needs considered and patient is agreeable to the plan of care and goals as stated below:     Anticipated Barriers for therapy: post-surgery;  cardiac.    Medical Necessity is demonstrated by the following  History  Co-morbidities and personal factors that may impact the plan of care Co-morbidities:   advanced age, CAD and pacemarker, prior knee surgery    Personal Factors:   age     high   Examination  Body Structures and Functions, activity limitations and participation restrictions that may impact the plan of care Body Regions:   lower extremities    Body Systems:    ROM  strength  gait  transfers  motor control    Participation Restrictions:   Walking Program    Activity limitations:   Learning and applying knowledge  no deficits    General Tasks and Commands  no deficits    Communication  no deficits    Mobility  lifting and carrying objects  walking  stairs, transfers    Self care  looking after one's health    Domestic Life  doing house work (cleaning house, washing dishes, laundry)    Interactions/Relationships  no deficits    Life Areas  no deficits    Community and Social Life  community life         high   Clinical Presentation stable and uncomplicated low   Decision Making/ Complexity Score: low     Goals:  Short Term Goals: 3-4 weeks   1. Patient will have reduced pain complaints from 7/10 to less than or equal to 4/10. (Not Met - Progressing)  2. Patient will demonstrate increased AROM/PROM by approximately 25% to 50% of initial measurements. (Not Met - Progressing)  3. Patient will demonstrate increased muscle strength of at 25% to 50% as compared to the initial measurements. (Not Met - Progressing)    Long Term Goals: 6-8 weeks   1. Patient will have reduced pain complaints from 3/10 to less than or equal to 0/10. (Not Met - Progressing)  2. Patient will demonstrate increased AROM/PROM by approximately 50% to 75% of initial measurements. (Not Met - Progressing)  3. Patient will demonstrate increased muscle strength of at least  50% to 75% as compared to the initial measurements. (Not Met - Progressing)  4. Patient will improve Knee FOTO  limitation score from 67% to less than or equal to 46% (16 visits). (Not Met - Progressing)  5. Patient will be able to perform the 30 second sit to stand test with greater than or equal to 8 reps and normal biomechanics. (Not Met - Progressing)   6. Patient will be independent with their home exercise program. (Not Met - Progressing)    PLAN   Plan of care Certification: 4/25/2022 to 7/2/2022.    Outpatient Physical Therapy 2 times weekly for 6 weeks to include the following interventions: Gait Training, Manual Therapy, Neuromuscular Re-ed, Orthotic Management and Training, Patient Education, Therapeutic Activities and Therapeutic Exercise.     Reinier Edge, PT, DPT, OCS      I CERTIFY THE NEED FOR THESE SERVICES FURNISHED UNDER THIS PLAN OF TREATMENT AND WHILE UNDER MY CARE   Physician's comments:     Physician's Signature: ___________________________________________________

## 2022-05-02 NOTE — PROGRESS NOTES
[facility]   Physical Therapy Treatment Note     Name: Lu White  Clinic Number: [id number]    Therapy Diagnosis:   Encounter Diagnoses   Name Primary?    Difficulty walking Yes    Decreased ROM of left knee      Physician: Adam Garcia PA-C    Visit Date: 5/3/2022    Physician Orders: PT Eval and Treat  Medical Diagnosis from Referral: M17.12 (ICD-10-CM) - Unilateral primary osteoarthritis, left knee  Evaluation Date: 4/25/2022  Authorization Period Expiration: 5/23/2023  Plan of Care Expiration: 7/2/2022  Visit # / Visits authorized: 2/ 12   FOTO: 2/ 5  PTA Visit #: 0/5     Time In: 14:15  Time Out: 15:00  Total Billable Time: 45 minutes    Precautions: Standard and pacemaker    SUBJECTIVE     Pt reports: no falls and she has been walking around her house, the grocery store and performing household chores.  She was compliant with home exercise program.  Response to previous treatment: Initial evaluation  Functional change: Ongoing    Pain: 1/10  Location: Left Knee    OBJECTIVE     Objective Measures updated at progress report unless specified.     AROM:  Knee   Action Left Right   Flexion 102 degrees 105 degrees   Extension 0 degrees +1 degrees      PROM:  Knee   Action Left Right   Flexion 110 degrees 113 degrees   Extension +1 degrees +2 degrees     Treatment     Lu received the treatments listed below:      Therapeutic Exercises to develop strength, endurance, ROM and flexibility for 37 minutes including:  · Stair Lunges - 2 minutes  · Short arc quad - 2#; 3x10  · Straight Leg Raise - 2#; 2x10  · Sidelying hip abduction - 2x10  · Long arc quad - 2#; 3x10  · Sit to stands - 3x10  · Heel Raises - 3x10  Standing Hip abduction - bilateral x20  Standing hip extension - bilateral x20  · Step Ups (forward and lateral) - Level 1 step; x20    Manual Therapy Techniques: Joint mobilizations were applied to the: Right knee for 8 minutes, including:  Stretching for knee flexion and  extension  Patellar mobilizations in all planes    Patient Education and Home Exercises     Home Exercises Provided and Patient Education Provided     Education provided:   Symptom management and plan of care progressions.  Home Exercise Program.    Written Home Exercises Provided: yes. Exercises were reviewed and Lu was able to demonstrate them prior to the end of the session.  Lu demonstrated good  understanding of the education provided. See EMR under Patient Instructions for exercises provided during therapy sessions    ASSESSMENT     Lu presents back to the clinic for her first follow-up appointment. Compliant with home exercise program. Walking around grocery store and performing household chores. Continues to have intermittent swelling pending time spent weightbearing. Progressed mat exercises and closed chain activities with no difficulties today.    From initial evaluation on 4/25/2022 - Lu is a 89 y.o. female referred to outpatient Physical Therapy with a medical diagnosis of Unilateral primary osteoarthritis, left knee. She is status post Left TKA performed on 4/7/2022. She is 3+ weeks post-op. Patient presents with decreased range of motion, decreased strength, and gait deviations. She will benefit from skilled physical therapy to improve her joint mobility, lower extremity strength, gross proprioception, and restoration of normal movement mechanics for safe and effective return to home and community function    Lu Is progressing well towards her goals.   Pt prognosis is Good.     Pt will continue to benefit from skilled outpatient physical therapy to address the deficits listed in the problem list box on initial evaluation, provide pt/family education and to maximize pt's level of independence in the home and community environment.   Pt's spiritual, cultural and educational needs considered and pt agreeable to plan of care and goals.     Anticipated barriers to physical therapy:  cardiac, post-surgery    Goals:  Short Term Goals: 3-4 weeks   1. Patient will have reduced pain complaints from 7/10 to less than or equal to 4/10. (Not Met - Progressing)  2. Patient will demonstrate increased AROM/PROM by approximately 25% to 50% of initial measurements. (Not Met - Progressing)  3. Patient will demonstrate increased muscle strength of at 25% to 50% as compared to the initial measurements. (Not Met - Progressing)     Long Term Goals: 6-8 weeks   1. Patient will have reduced pain complaints from 3/10 to less than or equal to 0/10. (Not Met - Progressing)  2. Patient will demonstrate increased AROM/PROM by approximately 50% to 75% of initial measurements. (Not Met - Progressing)  3. Patient will demonstrate increased muscle strength of at least  50% to 75% as compared to the initial measurements. (Not Met - Progressing)  4. Patient will improve Knee FOTO limitation score from 67% to less than or equal to 46% (16 visits). (Not Met - Progressing)  5. Patient will be able to perform the 30 second sit to stand test with greater than or equal to 8 reps and normal biomechanics. (Not Met - Progressing)   6. Patient will be independent with their home exercise program. (Not Met - Progressing)    PLAN     Continue with post-op TKA protocol.    Plan of care Certification: 4/25/2022 to 7/2/2022.  Outpatient Physical Therapy 2 times weekly for 6 weeks     Reinier Edge, PT , DPT, OCS

## 2022-05-03 ENCOUNTER — CLINICAL SUPPORT (OUTPATIENT)
Dept: REHABILITATION | Facility: HOSPITAL | Age: 87
End: 2022-05-03
Payer: MEDICARE

## 2022-05-03 DIAGNOSIS — M25.662 DECREASED ROM OF LEFT KNEE: ICD-10-CM

## 2022-05-03 DIAGNOSIS — R26.2 DIFFICULTY WALKING: Primary | ICD-10-CM

## 2022-05-03 PROCEDURE — 97110 THERAPEUTIC EXERCISES: CPT | Mod: PN

## 2022-05-03 PROCEDURE — 97140 MANUAL THERAPY 1/> REGIONS: CPT | Mod: PN

## 2022-05-09 ENCOUNTER — CLINICAL SUPPORT (OUTPATIENT)
Dept: REHABILITATION | Facility: HOSPITAL | Age: 87
End: 2022-05-09
Payer: MEDICARE

## 2022-05-09 DIAGNOSIS — R26.2 DIFFICULTY WALKING: Primary | ICD-10-CM

## 2022-05-09 DIAGNOSIS — M25.662 DECREASED ROM OF LEFT KNEE: ICD-10-CM

## 2022-05-09 PROCEDURE — 97140 MANUAL THERAPY 1/> REGIONS: CPT | Mod: PN,CQ

## 2022-05-09 PROCEDURE — 97110 THERAPEUTIC EXERCISES: CPT | Mod: PN,CQ

## 2022-05-09 NOTE — PROGRESS NOTES
[facility]   Physical Therapy Treatment Note     Name: Lu White  Clinic Number: [id number]    Therapy Diagnosis:   Encounter Diagnoses   Name Primary?    Difficulty walking Yes    Decreased ROM of left knee      Physician: Adam Garcia PA-C    Visit Date: 5/9/2022    Physician Orders: PT Eval and Treat  Medical Diagnosis from Referral: M17.12 (ICD-10-CM) - Unilateral primary osteoarthritis, left knee  Evaluation Date: 4/25/2022  Authorization Period Expiration: 5/23/2023  Plan of Care Expiration: 7/2/2022  Visit # / Visits authorized: 3/ 12   FOTO: 3/ 5  PTA Visit #: 1/5     Time In: 11:15  Time Out: 12:00  Total Billable Time: 45 minutes    Precautions: Standard and pacemaker    SUBJECTIVE     Pt reports: walking a lot, doing okay, bending bothers her  She was compliant with home exercise program.  Response to previous treatment: Initial evaluation  Functional change: Ongoing    Pain: 1/10  Location: Left Knee    OBJECTIVE     Objective Measures updated at progress report unless specified.     AROM:  Knee   Action Left Right   Flexion 104 degrees 105 degrees   Extension 0 degrees +1 degrees      PROM:  Knee   Action Left Right   Flexion 110 degrees 113 degrees   Extension +1 degrees +2 degrees     Treatment     Lu received the treatments listed below:      Therapeutic Exercises to develop strength, endurance, ROM and flexibility for 37 minutes including:  · Stair Lunges - 2 minutes  · Short arc quad - 2#; 3x10  · Straight Leg Raise - 2#; 2x10  · Sidelying hip abduction - 2x10  · Long arc quad - 2#; 3x10  · Sit to stands with airex- 3x10  · Heel Raises - 3x10  · Standing Hip abduction - bilateral x20  · Standing hip extension - bilateral x20  · Step Ups (forward and lateral) - Level 1 step; x20    Manual Therapy Techniques: Joint mobilizations were applied to the: Right knee for 8 minutes, including:  Stretching for knee flexion and extension  Patellar mobilizations in all  planes    Patient Education and Home Exercises     Home Exercises Provided and Patient Education Provided     Education provided:   Symptom management and plan of care progressions.  Home Exercise Program.    Written Home Exercises Provided: yes. Exercises were reviewed and Lu was able to demonstrate them prior to the end of the session.  Lu demonstrated good  understanding of the education provided. See EMR under Patient Instructions for exercises provided during therapy sessions    ASSESSMENT     Lu presents to clinic with greatest difficulty with bending. Presents with AROM left knee flexion to 104 degrees post treatment, guarding by patient noted with passive flexion. Required encouragement to progress ROM into end range in both weight bearing and non weight bearing. Cues required with forward/lateral step down for eccentric control. Plan to progress mobility and strength training on mat and in weight bearing.    From initial evaluation on 4/25/2022 - Lu is a 89 y.o. female referred to outpatient Physical Therapy with a medical diagnosis of Unilateral primary osteoarthritis, left knee. She is status post Left TKA performed on 4/7/2022. She is 3+ weeks post-op. Patient presents with decreased range of motion, decreased strength, and gait deviations. She will benefit from skilled physical therapy to improve her joint mobility, lower extremity strength, gross proprioception, and restoration of normal movement mechanics for safe and effective return to home and community function    Lu Is progressing well towards her goals.   Pt prognosis is Good.     Pt will continue to benefit from skilled outpatient physical therapy to address the deficits listed in the problem list box on initial evaluation, provide pt/family education and to maximize pt's level of independence in the home and community environment.   Pt's spiritual, cultural and educational needs considered and pt agreeable to plan of care  and goals.     Anticipated barriers to physical therapy: cardiac, post-surgery    Goals:  Short Term Goals: 3-4 weeks   1. Patient will have reduced pain complaints from 7/10 to less than or equal to 4/10. (Not Met - Progressing)  2. Patient will demonstrate increased AROM/PROM by approximately 25% to 50% of initial measurements. (Not Met - Progressing)  3. Patient will demonstrate increased muscle strength of at 25% to 50% as compared to the initial measurements. (Not Met - Progressing)     Long Term Goals: 6-8 weeks   1. Patient will have reduced pain complaints from 3/10 to less than or equal to 0/10. (Not Met - Progressing)  2. Patient will demonstrate increased AROM/PROM by approximately 50% to 75% of initial measurements. (Not Met - Progressing)  3. Patient will demonstrate increased muscle strength of at least  50% to 75% as compared to the initial measurements. (Not Met - Progressing)  4. Patient will improve Knee FOTO limitation score from 67% to less than or equal to 46% (16 visits). (Not Met - Progressing)  5. Patient will be able to perform the 30 second sit to stand test with greater than or equal to 8 reps and normal biomechanics. (Not Met - Progressing)   6. Patient will be independent with their home exercise program. (Not Met - Progressing)    PLAN     Continue with post-op TKA protocol.    Plan of care Certification: 4/25/2022 to 7/2/2022.  Outpatient Physical Therapy 2 times weekly for 6 weeks     HELDER ENCISO, EARL

## 2022-05-11 ENCOUNTER — CLINICAL SUPPORT (OUTPATIENT)
Dept: REHABILITATION | Facility: HOSPITAL | Age: 87
End: 2022-05-11
Payer: MEDICARE

## 2022-05-11 DIAGNOSIS — R26.2 DIFFICULTY WALKING: Primary | ICD-10-CM

## 2022-05-11 DIAGNOSIS — M25.662 DECREASED ROM OF LEFT KNEE: ICD-10-CM

## 2022-05-11 PROCEDURE — 97140 MANUAL THERAPY 1/> REGIONS: CPT | Mod: PN

## 2022-05-11 PROCEDURE — 97110 THERAPEUTIC EXERCISES: CPT | Mod: PN

## 2022-05-11 NOTE — PROGRESS NOTES
"[facility]   Physical Therapy Treatment Note     Name: Lu White  Clinic Number: [id number]    Therapy Diagnosis:   Encounter Diagnoses   Name Primary?    Difficulty walking Yes    Decreased ROM of left knee      Physician: Adam Garcia PA-C    Visit Date: 5/11/2022    Physician Orders: PT Eval and Treat  Medical Diagnosis from Referral: M17.12 (ICD-10-CM) - Unilateral primary osteoarthritis, left knee  Evaluation Date: 4/25/2022  Authorization Period Expiration: 5/23/2023  Plan of Care Expiration: 7/2/2022  Visit # / Visits authorized: 4/ 12   FOTO: 4/ 5  PTA Visit #: 1/5     Time In: 11:15  Time Out: 12:00  Total Billable Time: 45 minutes    Precautions: Standard and pacemaker    SUBJECTIVE     Pt reports: a little bit of pain today.   She was compliant with home exercise program.  Response to previous treatment:exhausted  Functional change: Ongoing    Pain: 3/10  Location: Left Knee    OBJECTIVE     Objective Measures updated at progress report unless specified.     AROM:  Knee   Action Left Right   Flexion 105 degrees 105 degrees   Extension 0 degrees +1 degrees      PROM:  Knee   Action Left Right   Flexion 110 degrees 113 degrees   Extension +1 degrees +2 degrees       30 sec STS 10 with upper extremity assist  Treatment     Lu received the treatments listed below:      Therapeutic Exercises to develop strength, endurance, ROM and flexibility for 37 minutes including:  · Stair Lunges - 2 minutes  · Short arc quad - 2#; 3x10  · Straight Leg Raise - 2#; 2x10  · Sidelying hip abduction - 2x10  · Long arc quad - 2#; 3x10  · Sit to stands with airex- 3x10  · Heel Raises - 3x10  · Standing Hip abduction - bilateral x20  · Standing hip extension - bilateral x20  · Step Ups (forward and lateral) - Level 1 step; x20  · TKE green theraband 15x  · SLS 3x30" (10 sec max right, 5 sec max left)    Manual Therapy Techniques: Joint mobilizations were applied to the: Right knee for 8 " minutes, including:  Stretching for knee flexion and extension  Patellar mobilizations in all planes    Patient Education and Home Exercises     Home Exercises Provided and Patient Education Provided     Education provided:   Symptom management and plan of care progressions.  Home Exercise Program.    Written Home Exercises Provided: yes. Exercises were reviewed and Lu was able to demonstrate them prior to the end of the session.  Lu demonstrated good  understanding of the education provided. See EMR under Patient Instructions for exercises provided during therapy sessions    ASSESSMENT     Lu presents to clinic with flexion limitation, able to achieve  left knee flexion AROM to 105 degrees with pain. Continued to progress with weight bearing exercises for lower extremity strengthening. Added balance training to improve single leg strength and stability with pelvic drop with performance of left leg.  Continue to progress as tolerated.     From initial evaluation on 4/25/2022 - Lu is a 89 y.o. female referred to outpatient Physical Therapy with a medical diagnosis of Unilateral primary osteoarthritis, left knee. She is status post Left TKA performed on 4/7/2022. She is 3+ weeks post-op. Patient presents with decreased range of motion, decreased strength, and gait deviations. She will benefit from skilled physical therapy to improve her joint mobility, lower extremity strength, gross proprioception, and restoration of normal movement mechanics for safe and effective return to home and community function    Lu Is progressing well towards her goals.   Pt prognosis is Good.     Pt will continue to benefit from skilled outpatient physical therapy to address the deficits listed in the problem list box on initial evaluation, provide pt/family education and to maximize pt's level of independence in the home and community environment.   Pt's spiritual, cultural and educational needs considered and pt  agreeable to plan of care and goals.     Anticipated barriers to physical therapy: cardiac, post-surgery    Goals:  Short Term Goals: 3-4 weeks   1. Patient will have reduced pain complaints from 7/10 to less than or equal to 4/10. (Not Met - Progressing)  2. Patient will demonstrate increased AROM/PROM by approximately 25% to 50% of initial measurements. (Not Met - Progressing)  3. Patient will demonstrate increased muscle strength of at 25% to 50% as compared to the initial measurements. (Not Met - Progressing)     Long Term Goals: 6-8 weeks   1. Patient will have reduced pain complaints from 3/10 to less than or equal to 0/10. (Not Met - Progressing)  2. Patient will demonstrate increased AROM/PROM by approximately 50% to 75% of initial measurements. (Not Met - Progressing)  3. Patient will demonstrate increased muscle strength of at least  50% to 75% as compared to the initial measurements. (Not Met - Progressing)  4. Patient will improve Knee FOTO limitation score from 67% to less than or equal to 46% (16 visits). (Not Met - Progressing)  5. Patient will be able to perform the 30 second sit to stand test with greater than or equal to 8 reps and normal biomechanics. (Not Met - Progressing)   6. Patient will be independent with their home exercise program. (Not Met - Progressing)    PLAN     Continue with post-op TKA protocol.    Plan of care Certification: 4/25/2022 to 7/2/2022.  Outpatient Physical Therapy 2 times weekly for 6 weeks     MOE CHUNG, PT

## 2022-05-19 ENCOUNTER — CLINICAL SUPPORT (OUTPATIENT)
Dept: REHABILITATION | Facility: HOSPITAL | Age: 87
End: 2022-05-19
Payer: MEDICARE

## 2022-05-19 DIAGNOSIS — M25.662 DECREASED ROM OF LEFT KNEE: ICD-10-CM

## 2022-05-19 DIAGNOSIS — R26.2 DIFFICULTY WALKING: Primary | ICD-10-CM

## 2022-05-19 PROCEDURE — 97110 THERAPEUTIC EXERCISES: CPT | Mod: PN,CQ

## 2022-05-19 NOTE — PROGRESS NOTES
"[facility]   Physical Therapy Treatment Note     Name: Lu White  Clinic Number: [id number]    Therapy Diagnosis:   Encounter Diagnoses   Name Primary?    Difficulty walking Yes    Decreased ROM of left knee      Physician: Adam Garcia PA-C    Visit Date: 5/19/2022    Physician Orders: PT Eval and Treat  Medical Diagnosis from Referral: M17.12 (ICD-10-CM) - Unilateral primary osteoarthritis, left knee  Evaluation Date: 4/25/2022  Authorization Period Expiration: 5/23/2023  Plan of Care Expiration: 7/2/2022  Visit # / Visits authorized: 5/ 12   FOTO: 4/ 5  PTA Visit #: 2/5     Time In: 1115  Time Out: 1200  Total Billable Time: 45 minutes    Precautions: Standard and pacemaker    SUBJECTIVE     Pt reports: she is doing well, no complaints   She was compliant with home exercise program.  Response to previous treatment:exhausted  Functional change: Ongoing    Pain: 3/10  Location: Left Knee    OBJECTIVE     Objective Measures updated at progress report unless specified.     AROM:  Knee   Action Left Right   Flexion 105 degrees 105 degrees   Extension 0 degrees +1 degrees      PROM:  Knee   Action Left Right   Flexion 110 degrees 113 degrees   Extension +1 degrees +2 degrees       30 sec STS 10 with upper extremity assist  Treatment     Lu received the treatments listed below:      Therapeutic Exercises to develop strength, endurance, ROM and flexibility for 37 minutes including:  · Stair Lunges - 2 minutesmn-NP  · Short arc quad - 2#; 3x10  · Straight Leg Raise - 2#; 2x10  · Sidelying hip abduction - 2x10  · Long arc quad - 2#; 3x10  · HS curl GTB x30  · Sit to stands with airex- 3x10  · Heel Raises - 3x10  · Standing Hip abduction - bilateral x20  · Standing hip extension - bilateral x20  · Step Ups (forward and lateral) - Level 1 step; x20  · TKE green theraband 15x-NP  · SLS 3x30" (10 sec max right, 5 sec max left)  · Airex pad - HTs, EC [id number] each    Manual Therapy " Techniques: Joint mobilizations were applied to the: Right knee for 8 minutes, including:  Stretching for knee flexion and extension  Patellar mobilizations in all planes    Patient Education and Home Exercises     Home Exercises Provided and Patient Education Provided     Education provided:   Symptom management and plan of care progressions.  Home Exercise Program.    Written Home Exercises Provided: yes. Exercises were reviewed and Lu was able to demonstrate them prior to the end of the session.  Lu demonstrated good  understanding of the education provided. See EMR under Patient Instructions for exercises provided during therapy sessions    ASSESSMENT     Lu presents to clinic ambulating with no assistive device, slight antalgic gait pattern. Pt showed improvements with tolerance to PROM flexion. Added open chain strengthening of hamstring tolerated well. Pt performed well with balance activities and introduction to airex pad. No increase in pain or symptoms throughout session.    Lu Is progressing well towards her goals.   Pt prognosis is Good.     Pt will continue to benefit from skilled outpatient physical therapy to address the deficits listed in the problem list box on initial evaluation, provide pt/family education and to maximize pt's level of independence in the home and community environment.   Pt's spiritual, cultural and educational needs considered and pt agreeable to plan of care and goals.     Anticipated barriers to physical therapy: cardiac, post-surgery    Goals:  Short Term Goals: 3-4 weeks   1. Patient will have reduced pain complaints from 7/10 to less than or equal to 4/10. (Not Met - Progressing)  2. Patient will demonstrate increased AROM/PROM by approximately 25% to 50% of initial measurements. (Not Met - Progressing)  3. Patient will demonstrate increased muscle strength of at 25% to 50% as compared to the initial measurements. (Not Met - Progressing)     Long Term Goals:  6-8 weeks   1. Patient will have reduced pain complaints from 3/10 to less than or equal to 0/10. (Not Met - Progressing)  2. Patient will demonstrate increased AROM/PROM by approximately 50% to 75% of initial measurements. (Not Met - Progressing)  3. Patient will demonstrate increased muscle strength of at least  50% to 75% as compared to the initial measurements. (Not Met - Progressing)  4. Patient will improve Knee FOTO limitation score from 67% to less than or equal to 46% (16 visits). (Not Met - Progressing)  5. Patient will be able to perform the 30 second sit to stand test with greater than or equal to 8 reps and normal biomechanics. (Not Met - Progressing)   6. Patient will be independent with their home exercise program. (Not Met - Progressing)    PLAN     Continue with post-op TKA protocol.    Plan of care Certification: 4/25/2022 to 7/2/2022.  Outpatient Physical Therapy 2 times weekly for 6 weeks     Brandt Calhoun, PTA

## 2022-05-25 ENCOUNTER — CLINICAL SUPPORT (OUTPATIENT)
Dept: REHABILITATION | Facility: HOSPITAL | Age: 87
End: 2022-05-25
Payer: MEDICARE

## 2022-05-25 DIAGNOSIS — M25.662 DECREASED ROM OF LEFT KNEE: ICD-10-CM

## 2022-05-25 DIAGNOSIS — R26.2 DIFFICULTY WALKING: Primary | ICD-10-CM

## 2022-05-25 PROCEDURE — 97110 THERAPEUTIC EXERCISES: CPT | Mod: PN,CQ

## 2022-05-25 NOTE — PROGRESS NOTES
"[facility]   Physical Therapy Treatment Note     Name: Lu White  Clinic Number: [id number]    Therapy Diagnosis:   Encounter Diagnoses   Name Primary?    Difficulty walking Yes    Decreased ROM of left knee      Physician: No ref. provider found    Visit Date: 5/25/2022    Physician Orders: PT Eval and Treat  Medical Diagnosis from Referral: M17.12 (ICD-10-CM) - Unilateral primary osteoarthritis, left knee  Evaluation Date: 4/25/2022  Authorization Period Expiration: 5/23/2023  Plan of Care Expiration: 7/2/2022  Visit # / Visits authorized: 6/ 12   FOTO: 6/ 5  NEXT  PTA Visit #: 2/5     Time In: 1:00 pm  Time Out: 1:45 pm  Total Billable Time: 45 minutes TE x 3    Precautions: Standard and pacemaker    SUBJECTIVE     Pt reports: she is doing well, no complaints   She was compliant with home exercise program.  Response to previous treatment:exhausted  Functional change: Ongoing    Pain: /10  Location: Left Knee    OBJECTIVE     Objective Measures updated at progress report unless specified.     Treatment     Lu received the treatments listed below:      Therapeutic Exercises to develop strength, endurance, ROM and flexibility for 40 minutes including:  · Stair Lunges - 2 minutes  · Short arc quad - 2#; 3x10  · Straight Leg Raise - 2#; 2x10 B  · Sidelying hip abduction - 2x10 B  · Long arc quad - 2#; 2x15  B  · HS curl GTB x30 2x15  · Sit to stands with airex- 1x10 hands on thighs + 1x10 no upper extremity support  · Heel Raises - 2x15  · Standing Hip abduction - red theraband 2x10 bilateral    · Standing hip extension - red theraband 2x10 bilateral   · Step Ups (forward and lateral left) - Level 1 step; x20  · TKE green theraband 15x-NP  · SLS 3x30"with one upper extremity support   · Airex pad - NBOS, EC [id number] each close CG/SBA    Manual Therapy Techniques: Joint mobilizations were applied to the: Right knee for 5 minutes, including:  Stretching for knee flexion and " "extension  Patellar mobilizations in all planes    Patient Education and Home Exercises     Home Exercises Provided and Patient Education Provided     Education provided:   Symptom management and plan of care progressions.  Home Exercise Program.    Written Home Exercises Provided: yes. Exercises were reviewed and Lu was able to demonstrate them prior to the end of the session.  Lu demonstrated good  understanding of the education provided. See EMR under Patient Instructions for exercises provided during therapy sessions    ASSESSMENT     Lu presents to clinic ambulating with no assistive device, slight antalgic gait pattern. Pt showed improvements with tolerance to all therex and progressions in reps as bolded. Pt performed well with balance activities, upper extremity support as noted.  No Loss of balance . No increase in pain or symptoms throughout session.  States "tired - that's enough for today" at end of session>    Lu Is progressing well towards her goals.   Pt prognosis is Good.     Pt will continue to benefit from skilled outpatient physical therapy to address the deficits listed in the problem list box on initial evaluation, provide pt/family education and to maximize pt's level of independence in the home and community environment.   Pt's spiritual, cultural and educational needs considered and pt agreeable to plan of care and goals.     Anticipated barriers to physical therapy: cardiac, post-surgery    Goals:  Short Term Goals: 3-4 weeks   1. Patient will have reduced pain complaints from 7/10 to less than or equal to 4/10. (Not Met - Progressing)  2. Patient will demonstrate increased AROM/PROM by approximately 25% to 50% of initial measurements. (Not Met - Progressing)  3. Patient will demonstrate increased muscle strength of at 25% to 50% as compared to the initial measurements. (Not Met - Progressing)     Long Term Goals: 6-8 weeks   1. Patient will have reduced pain complaints from " 3/10 to less than or equal to 0/10. (Not Met - Progressing)  2. Patient will demonstrate increased AROM/PROM by approximately 50% to 75% of initial measurements. (Not Met - Progressing)  3. Patient will demonstrate increased muscle strength of at least  50% to 75% as compared to the initial measurements. (Not Met - Progressing)  4. Patient will improve Knee FOTO limitation score from 67% to less than or equal to 46% (16 visits). (Not Met - Progressing)  5. Patient will be able to perform the 30 second sit to stand test with greater than or equal to 8 reps and normal biomechanics. (Not Met - Progressing)   6. Patient will be independent with their home exercise program. (Not Met - Progressing)    PLAN     Continue with post-op TKA protocol.    Plan of care Certification: 4/25/2022 to 7/2/2022.  Outpatient Physical Therapy 2 times weekly for 6 weeks     Ana Tijerina, PTA

## 2022-05-26 ENCOUNTER — DOCUMENTATION ONLY (OUTPATIENT)
Dept: REHABILITATION | Facility: HOSPITAL | Age: 87
End: 2022-05-26
Payer: MEDICARE

## 2022-05-26 ENCOUNTER — CLINICAL SUPPORT (OUTPATIENT)
Dept: REHABILITATION | Facility: HOSPITAL | Age: 87
End: 2022-05-26
Payer: MEDICARE

## 2022-05-26 DIAGNOSIS — R26.2 DIFFICULTY WALKING: Primary | ICD-10-CM

## 2022-05-26 DIAGNOSIS — M25.662 DECREASED ROM OF LEFT KNEE: ICD-10-CM

## 2022-05-26 PROCEDURE — 97110 THERAPEUTIC EXERCISES: CPT | Mod: PN,CQ

## 2022-05-26 NOTE — PROGRESS NOTES
"[facility]   Physical Therapy Treatment Note     Name: Lu White  Clinic Number: [id number]    Therapy Diagnosis:   Encounter Diagnoses   Name Primary?    Difficulty walking Yes    Decreased ROM of left knee      Physician: No ref. provider found    Visit Date: 5/26/2022    Physician Orders: PT Eval and Treat  Medical Diagnosis from Referral: M17.12 (ICD-10-CM) - Unilateral primary osteoarthritis, left knee  Evaluation Date: 4/25/2022  Authorization Period Expiration: 5/23/2023  Plan of Care Expiration: 7/2/2022  Visit # / Visits authorized: 7/ 12   FOTO: 7/ 5  NEXT  PTA Visit #: 3/5     Time In: 12:45 pm  Time Out: 1:40 pm  Total Billable Time: 55 minutes TE x 4    Precautions: Standard and pacemaker    SUBJECTIVE     Pt reports: she is doing well, no complaints   She was compliant with home exercise program.  Response to previous treatment:exhausted  Functional change: Ongoing    Pain: 010  Location: Left Knee    OBJECTIVE     Objective Measures updated at progress report unless specified.     Treatment     Lu received the treatments listed below:      Therapeutic Exercises to develop strength, endurance, ROM and flexibility for 40 minutes including:  · Stair Lunges - 2 minutes  · Short arc quad - 3# 2x10 B  · Straight Leg Raise - 2#; 3x10 B  · Sidelying hip abduction - 2x10 B  · Long arc quad - 3# 2x10  B  · HS curl GTB  2x15  · Sit to stands with airex- 2x10 no upper extremity support  · Heel Raises - 2x15  · Standing Hip abduction - red theraband 3x10 bilateral    · Standing hip extension - red theraband 3x10 bilateral   · Step Ups (forward and lateral left) - Level 1 step; x20  · TKE green theraband 15x  · SLS 3x30"with one upper extremity support   · Airex pad - NBOS, EC [id number] each  CG/SBA    Manual Therapy Techniques: Joint mobilizations were applied to the: Right knee for 5 minutes, including:  Stretching for knee flexion and extension  Patellar mobilizations in all " "planes    Patient Education and Home Exercises     Home Exercises Provided and Patient Education Provided     Education provided:   Symptom management and plan of care progressions.  Home Exercise Program.    Written Home Exercises Provided: yes. Exercises were reviewed and Lu was able to demonstrate them prior to the end of the session.  Lu demonstrated good  understanding of the education provided. See EMR under Patient Instructions for exercises provided during therapy sessions    ASSESSMENT     Lu presents to clinic ambulating with no assistive device, slight antalgic gait pattern. Pt showed improvements with tolerance to all therex and progressions in reps and added weight as bolded. Pt performed well with balance activities, upper extremity support as noted.  No Loss of balance . No increase in pain or symptoms throughout session.  Patient achieved appropriate level of fatigue after treatment.  "I'm really tired now - but I need that"  No complaints of pain.    Lu Is progressing well towards her goals.   Pt prognosis is Good.     Pt will continue to benefit from skilled outpatient physical therapy to address the deficits listed in the problem list box on initial evaluation, provide pt/family education and to maximize pt's level of independence in the home and community environment.   Pt's spiritual, cultural and educational needs considered and pt agreeable to plan of care and goals.     Anticipated barriers to physical therapy: cardiac, post-surgery    Goals:  Short Term Goals: 3-4 weeks   1. Patient will have reduced pain complaints from 7/10 to less than or equal to 4/10. (Not Met - Progressing)  2. Patient will demonstrate increased AROM/PROM by approximately 25% to 50% of initial measurements. (Not Met - Progressing)  3. Patient will demonstrate increased muscle strength of at 25% to 50% as compared to the initial measurements. (Not Met - Progressing)     Long Term Goals: 6-8 weeks "   1. Patient will have reduced pain complaints from 3/10 to less than or equal to 0/10. (Not Met - Progressing)  2. Patient will demonstrate increased AROM/PROM by approximately 50% to 75% of initial measurements. (Not Met - Progressing)  3. Patient will demonstrate increased muscle strength of at least  50% to 75% as compared to the initial measurements. (Not Met - Progressing)  4. Patient will improve Knee FOTO limitation score from 67% to less than or equal to 46% (16 visits). (Not Met - Progressing)  5. Patient will be able to perform the 30 second sit to stand test with greater than or equal to 8 reps and normal biomechanics. (Not Met - Progressing)   6. Patient will be independent with their home exercise program. (Not Met - Progressing)    PLAN     Continue with post-op TKA protocol.    Plan of care Certification: 4/25/2022 to 7/2/2022.  Outpatient Physical Therapy 2 times weekly for 6 weeks     Ana Tijerina, PTA

## 2022-05-26 NOTE — PROGRESS NOTES
Face to Face PTA Conference performed with Ana Tijerina PTA regarding patient's current status, overall progress, and plan of care.    Reinier Edge, PT, DPT, OCS       Ana Tijerina, PTA

## 2022-05-31 ENCOUNTER — CLINICAL SUPPORT (OUTPATIENT)
Dept: REHABILITATION | Facility: HOSPITAL | Age: 87
End: 2022-05-31
Payer: MEDICARE

## 2022-05-31 DIAGNOSIS — R26.2 DIFFICULTY WALKING: Primary | ICD-10-CM

## 2022-05-31 DIAGNOSIS — M25.662 DECREASED ROM OF LEFT KNEE: ICD-10-CM

## 2022-05-31 PROCEDURE — 97110 THERAPEUTIC EXERCISES: CPT | Mod: PN,CQ

## 2022-05-31 NOTE — PROGRESS NOTES
"[facility]   Physical Therapy Treatment Note     Name: Lu White  Clinic Number: [id number]    Therapy Diagnosis:   Encounter Diagnoses   Name Primary?    Difficulty walking Yes    Decreased ROM of left knee      Physician: No ref. provider found    Visit Date: 5/31/2022    Physician Orders: PT Eval and Treat  Medical Diagnosis from Referral: M17.12 (ICD-10-CM) - Unilateral primary osteoarthritis, left knee  Evaluation Date: 4/25/2022  Authorization Period Expiration: 5/23/2023  Plan of Care Expiration: 7/2/2022  Visit # / Visits authorized: 8/ 12   FOTO: 8/ 5  Completed # 2   5/31/2022  PTA Visit #: 3/5     Time In: 12:55 pm  Time Out: 1:30 pm  Total Billable Time: 35 minutes TE x 2    Precautions: Standard and pacemaker    SUBJECTIVE     Pt reports:"every now and then I get a little a twinge in my knee - but doing ok".  States she pulled something in her left side and its bothering her a bit."  She was compliant with home exercise program.  Response to previous treatment:exhausted  Functional change: Able to get into car without having to open the door all the way.    Pain: 010  Location: Left Knee    OBJECTIVE     Objective Measures updated at progress report unless specified.     Treatment     Lu received the treatments listed below:      Therapeutic Exercises to develop strength, endurance, ROM and flexibility for 35 minutes including:  · Stair Lunges - 2 minutes  · Short arc quad - 3# 2x10 B  · Straight Leg Raise - 2#; 3x10 B  · Sidelying hip abduction - 2# 2x10 B  · Long arc quad - 3# 2x10  B  · HS curl GTB  2x15  · Sit to stands from 21" mat- 2x10 no upper extremity support  · Heel Raises - 2x15  · Standing Hip abduction - red theraband 2x15 bilateral    · Standing hip extension - red theraband 3x10 bilateral   Step Ups (forward and lateral left) - Level 1 step; x20  TKE green theraband 15x  SLS 3x30"with one upper extremity support   Airex pad - NBOS, EC [id number] each  " CG/SBA    Manual Therapy Techniques: Joint mobilizations were applied to the: Right knee for 0 minutes, including:  Not today  Stretching for knee flexion and extension  Patellar mobilizations in all planes    Patient Education and Home Exercises     Home Exercises Provided and Patient Education Provided     Education provided:   Symptom management and plan of care progressions.  Home Exercise Program.    Written Home Exercises Provided: yes. Exercises were reviewed and Lu was able to demonstrate them prior to the end of the session.  Lu demonstrated good  understanding of the education provided. See EMR under Patient Instructions for exercises provided during therapy sessions    ASSESSMENT     Lu presents to clinic ambulating with no assistive device, slight antalgic gait pattern. Pt showed improvements with tolerance to progressions in reps and added weight as bolded. Treatment slightly limited by time due to patient late for appointment.  Patient achieved appropriate level of fatigue after treatment.  No complaints of increased pain.  Lu Is progressing well towards her goals.   Pt prognosis is Good.     Pt will continue to benefit from skilled outpatient physical therapy to address the deficits listed in the problem list box on initial evaluation, provide pt/family education and to maximize pt's level of independence in the home and community environment.   Pt's spiritual, cultural and educational needs considered and pt agreeable to plan of care and goals.     Anticipated barriers to physical therapy: cardiac, post-surgery    Goals:  Short Term Goals: 3-4 weeks   1. Patient will have reduced pain complaints from 7/10 to less than or equal to 4/10. (Not Met - Progressing)  2. Patient will demonstrate increased AROM/PROM by approximately 25% to 50% of initial measurements. (Not Met - Progressing)  3. Patient will demonstrate increased muscle strength of at 25% to 50% as compared to the initial  measurements. (Not Met - Progressing)     Long Term Goals: 6-8 weeks   1. Patient will have reduced pain complaints from 3/10 to less than or equal to 0/10. (Not Met - Progressing)  2. Patient will demonstrate increased AROM/PROM by approximately 50% to 75% of initial measurements. (Not Met - Progressing)  3. Patient will demonstrate increased muscle strength of at least  50% to 75% as compared to the initial measurements. (Not Met - Progressing)  4. Patient will improve Knee FOTO limitation score from 67% to less than or equal to 46% (16 visits). (Not Met - Progressing)  5. Patient will be able to perform the 30 second sit to stand test with greater than or equal to 8 reps and normal biomechanics. (Not Met - Progressing)   6. Patient will be independent with their home exercise program. (Not Met - Progressing)    PLAN     Continue with post-op TKA protocol.    Plan of care Certification: 4/25/2022 to 7/2/2022.  Outpatient Physical Therapy 2 times weekly for 6 weeks     Ana Tijerina, EARL

## 2022-06-03 ENCOUNTER — CLINICAL SUPPORT (OUTPATIENT)
Dept: REHABILITATION | Facility: HOSPITAL | Age: 87
End: 2022-06-03
Payer: MEDICARE

## 2022-06-03 DIAGNOSIS — R26.2 DIFFICULTY WALKING: Primary | ICD-10-CM

## 2022-06-03 DIAGNOSIS — M25.662 DECREASED ROM OF LEFT KNEE: ICD-10-CM

## 2022-06-03 PROCEDURE — 97110 THERAPEUTIC EXERCISES: CPT | Mod: PN

## 2022-06-03 NOTE — PROGRESS NOTES
[facility]   Physical Therapy Treatment Note     Name: Lu White  Clinic Number: [id number]    Therapy Diagnosis:   Encounter Diagnoses   Name Primary?    Difficulty walking Yes    Decreased ROM of left knee      Physician: Adam Garcia PA-C    Visit Date: 6/3/2022    Physician Orders: PT Eval and Treat  Medical Diagnosis from Referral: M17.12 (ICD-10-CM) - Unilateral primary osteoarthritis, left knee  Evaluation Date: 4/25/2022  Authorization Period Expiration: 5/23/2023  Plan of Care Expiration: 7/2/2022  Visit # / Visits authorized: 9/ 12   FOTO: 9/ 10  (Completed # 2   5/31/2022)  PTA Visit #: 0/5     Time In: 13:00  Time Out: 13:40  Total Billable Time: 40 minutes    Precautions: Standard and pacemaker    SUBJECTIVE     Pt reports: occasional swelling and twinges of pain, but no functional difficulties.  She was compliant with home exercise program.  Response to previous treatment:exhausted  Functional change: Able to get into car without having to open the door all the way.    Pain: 0/10  Location: Left Knee    OBJECTIVE     AROM:  Knee   Action Left Right   Flexion 107 degrees 105 degrees   Extension +1 degrees +1 degrees     Manual Muscle Testing:  Lower Extremity   Action Left Right   Hip Flexion 9.7 kg > 16.6 kg 11.6 kg   Hip Abduction 12.2 kg > 19.9 kg 12.7 kg   Knee Flexion 9.6 kg > 13.7 kg 13.6 kg   Knee Extension 11.2 kg > 15.3 kg 20.6 kg     · 30 Second sit to stand = 8 reps (no upper extremity use)    Treatment     Lu received the treatments listed below:      Therapeutic Exercises to develop strength, endurance, ROM and flexibility for 40 minutes including:  · Reassessment  Stair Lunges - 2 minutes  · Sit to stands - standard chair with no upper extremity support; 2x10  Heel Raises - 2x15  Standing Hip abduction - red theraband 2x15 bilateral    Standing hip extension - red theraband 3x10 bilateral   · Step Ups (forward and lateral left) - [place] step; x30  (unilateral upper extremity support - make sure she is not applying too much pressure through upper extremity))  · Leg Press - sled at 5; plates 5; 2x10 (double leg) >>> single leg next      Patient Education and Home Exercises     Home Exercises Provided and Patient Education Provided     Education provided:   Symptom management and plan of care progressions.  Home Exercise Program.    Written Home Exercises Provided: yes. Exercises were reviewed and Lu was able to demonstrate them prior to the end of the session.  Lu demonstrated good  understanding of the education provided. See EMR under Patient Instructions for exercises provided during therapy sessions    ASSESSMENT     Lu has met 5 out of 9 goals as of the 9th visit and is progressing as expected towards her remaining goals. Will progress closed chain activities with remaining visits and discontinue all open chain activities to promote the most functional strengthening possible.    Lu Is progressing well towards her goals.   Pt prognosis is Good.     Pt will continue to benefit from skilled outpatient physical therapy to address the deficits listed in the problem list box on initial evaluation, provide pt/family education and to maximize pt's level of independence in the home and community environment.   Pt's spiritual, cultural and educational needs considered and pt agreeable to plan of care and goals.     Anticipated barriers to physical therapy: cardiac, post-surgery    Goals:  Short Term Goals: 3-4 weeks   1. Patient will have reduced pain complaints from 7/10 to less than or equal to 4/10. (Met - 6/3/2022)  2. Patient will demonstrate increased AROM/PROM by approximately 25% to 50% of initial measurements. (Met - 6/3/2022)  3. Patient will demonstrate increased muscle strength of at 25% to 50% as compared to the initial measurements. (Met - 6/3/2022)     Long Term Goals: 6-8 weeks   1. Patient will have reduced pain complaints from 3/10  to less than or equal to 0/10. (Met - 6/3/2022)  2. Patient will demonstrate increased AROM/PROM by approximately 50% to 75% of initial measurements. (Not Met - Progressing)  3. Patient will demonstrate increased muscle strength of at least  50% to 75% as compared to the initial measurements. (Not Met - Progressing)  4. Patient will improve Knee FOTO limitation score from 67% to less than or equal to 46% (16 visits). (Not Met - Progressing)  5. Patient will be able to perform the 30 second sit to stand test with greater than or equal to 8 reps and normal biomechanics. (Not Met - Progressing)   6. Patient will be independent with their home exercise program. (Met - 6/3/2022)    PLAN     Continue with post-op TKA protocol.    Plan of care Certification: 4/25/2022 to 7/2/2022.  Outpatient Physical Therapy 2 times weekly for 6 weeks     Reinier Edge, PT, DPT, OCS

## 2022-06-08 ENCOUNTER — CLINICAL SUPPORT (OUTPATIENT)
Dept: REHABILITATION | Facility: HOSPITAL | Age: 87
End: 2022-06-08
Payer: MEDICARE

## 2022-06-08 DIAGNOSIS — M25.662 DECREASED ROM OF LEFT KNEE: ICD-10-CM

## 2022-06-08 DIAGNOSIS — R26.2 DIFFICULTY WALKING: Primary | ICD-10-CM

## 2022-06-08 PROCEDURE — 97110 THERAPEUTIC EXERCISES: CPT | Mod: PN

## 2022-06-08 NOTE — PROGRESS NOTES
"[facility]   Physical Therapy Treatment Note     Name: Lu White  Clinic Number: [id number]    Therapy Diagnosis:   Encounter Diagnoses   Name Primary?    Difficulty walking Yes    Decreased ROM of left knee      Physician: Adam Garcia PA-C    Visit Date: 6/8/2022    Physician Orders: PT Eval and Treat  Medical Diagnosis from Referral: M17.12 (ICD-10-CM) - Unilateral primary osteoarthritis, left knee  Evaluation Date: 4/25/2022  Authorization Period Expiration: 5/23/2023  Plan of Care Expiration: 7/2/2022  Visit # / Visits authorized: 10/ 12   FOTO: 10/ 10  (Completed # 2  5/31/2022)  PTA Visit #: 0/5     Time In: 1215 pm   Time Out: 0100 pm  Total Billable Time: 45 minutes (3 TE)    Precautions: Standard and pacemaker     L TKA DOS: 4/7/2022    SUBJECTIVE     Pt reports: slight swelling still present but overall still improving  She was compliant with home exercise program.  Response to previous treatment:exhausted  Functional change: Able to get into car without having to open the door all the way.    Pain: 0/10  Location: Left Knee    OBJECTIVE     AROM:  Knee   Action Left Right   Flexion 107 degrees 105 degrees   Extension +1 degrees +1 degrees     Manual Muscle Testing:  Lower Extremity   Action Left Right   Hip Flexion 9.7 kg > 16.6 kg 11.6 kg   Hip Abduction 12.2 kg > 19.9 kg 12.7 kg   Knee Flexion 9.6 kg > 13.7 kg 13.6 kg   Knee Extension 11.2 kg > 15.3 kg 20.6 kg     · 30 Second sit to stand = 8 reps (no upper extremity use)    Treatment     Lu received the treatments listed below:      Therapeutic Exercises to develop strength, endurance, ROM and flexibility for 45 minutes including:  · Recumbent bike; Lv 3.0 for 5 min for increased CV endurance and tissue extensiblity  · Stair Flex/ Ext stretch; 4 x 30"   · Sit to stands - standard chair with no upper extremity support; 2x10- NP  · Heel Raises - 2 x 20  · Wedge calf stretch; 2 x 20" in between sets of heel " raises  · Standing Hip abduction - green theraband 2 x 15 bilateral    · Standing Hip extension - green theraband 2 x 15 bilateral - NP  · Step Ups (forward and lateral left) - 6 in stair; x30 (unilateral upper extremity support - make sure she is not applying too much pressure through upper extremity))  · DL Leg Press - sled at 6; plates 6; 3 x 10  · SL Leg Press - sled at 6; plates 3; 3 x 10--> increase weight and decrease reps next session     Patient Education and Home Exercises     Home Exercises Provided and Patient Education Provided     Education provided:   Symptom management and plan of care progressions.  Home Exercise Program.    Written Home Exercises Provided: yes. Exercises were reviewed and Lu was able to demonstrate them prior to the end of the session.  Lu demonstrated good  understanding of the education provided. See EMR under Patient Instructions for exercises provided during therapy sessions    ASSESSMENT   Lu presents 9 wks s/p L TKA. She was able to tolerate increased load and depth in DL leg press and began SL leg press without issue. She will benefit continued loading and progression with emphasis on maintaining terminal knee extension strength.     Lu Is progressing well towards her goals.   Pt prognosis is Good.     Pt will continue to benefit from skilled outpatient physical therapy to address the deficits listed in the problem list box on initial evaluation, provide pt/family education and to maximize pt's level of independence in the home and community environment.   Pt's spiritual, cultural and educational needs considered and pt agreeable to plan of care and goals.     Anticipated barriers to physical therapy: cardiac, post-surgery    Goals:  Short Term Goals: 3-4 weeks   1. Patient will have reduced pain complaints from 7/10 to less than or equal to 4/10. (Met - 6/3/2022)  2. Patient will demonstrate increased AROM/PROM by approximately 25% to 50% of initial  measurements. (Met - 6/3/2022)  3. Patient will demonstrate increased muscle strength of at 25% to 50% as compared to the initial measurements. (Met - 6/3/2022)     Long Term Goals: 6-8 weeks   1. Patient will have reduced pain complaints from 3/10 to less than or equal to 0/10. (Met - 6/3/2022)  2. Patient will demonstrate increased AROM/PROM by approximately 50% to 75% of initial measurements. (Not Met - Progressing)  3. Patient will demonstrate increased muscle strength of at least  50% to 75% as compared to the initial measurements. (Not Met - Progressing)  4. Patient will improve Knee FOTO limitation score from 67% to less than or equal to 46% (16 visits). (Not Met - Progressing)  5. Patient will be able to perform the 30 second sit to stand test with greater than or equal to 8 reps and normal biomechanics. (Not Met - Progressing)   6. Patient will be independent with their home exercise program. (Met - 6/3/2022)    PLAN     Continue with post-op TKA protocol.    Plan of care Certification: 4/25/2022 to 7/2/2022.  Outpatient Physical Therapy 2 times weekly for 6 weeks     Meredith Hernandez, PT, DPT

## 2022-06-10 ENCOUNTER — CLINICAL SUPPORT (OUTPATIENT)
Dept: REHABILITATION | Facility: HOSPITAL | Age: 87
End: 2022-06-10
Payer: MEDICARE

## 2022-06-10 DIAGNOSIS — M25.662 DECREASED ROM OF LEFT KNEE: ICD-10-CM

## 2022-06-10 DIAGNOSIS — R26.2 DIFFICULTY WALKING: Primary | ICD-10-CM

## 2022-06-10 PROCEDURE — 97110 THERAPEUTIC EXERCISES: CPT | Mod: PN,CQ

## 2022-06-10 NOTE — PROGRESS NOTES
"[facility]   Physical Therapy Treatment Note     Name: Lu White  Clinic Number: [id number]    Therapy Diagnosis:   Encounter Diagnoses   Name Primary?    Difficulty walking Yes    Decreased ROM of left knee      Physician: Adam Garcia PA-C    Visit Date: 6/10/2022    Physician Orders: PT Eval and Treat  Medical Diagnosis from Referral: M17.12 (ICD-10-CM) - Unilateral primary osteoarthritis, left knee  Evaluation Date: 4/25/2022  Authorization Period Expiration: 5/23/2023  Plan of Care Expiration: 7/2/2022  Visit # / Visits authorized: 11/ 12   FOTO: 11/ 15  (Completed # 2  5/31/2022)  PTA Visit #: 1/5     Time In: 12:45 pm   Time Out:1:30 pm  Total Billable Time: 45 minutes (3 TE)    Precautions: Standard and pacemaker     L TKA DOS: 4/7/2022    SUBJECTIVE     Pt reports: overall still improving. States a little swelling but no complaints of pain.  States she came to PT even in the rain because she "doesn't want to get behind"  She was compliant with home exercise program.  Response to previous treatment:exhausted  Functional change: Able to get into car without having to open the door all the way.    Pain: 0/10  Location: Left Knee    OBJECTIVE     Objective measurements taken at Update unless otherwise noted.    Treatment     Lu received the treatments listed below:      Therapeutic Exercises to develop strength, endurance, ROM and flexibility for 45 minutes including:  · Recumbent bike; Lv 3.0 for 5 min for increased CV endurance and tissue extensiblity  · Stair Flex/ Ext stretch; 4 x 30"   · Sit to stands - standard chair with no upper extremity support; 1x10 Required upper extremity support 2nd set of 10  · Heel Raises - 2 x 20  · Wedge(fitter) calf stretch; 2 x 20" in between sets of heel raises  · Standing Hip abduction - green theraband 2 x 15 bilateral    · Standing Hip extension - green theraband 2 x 15 bilateral - NP  · Step Ups (forward and lateral left) - 6 in " "stair; x30 (unilateral upper extremity support - make sure she is not applying too much pressure through upper extremity))  · DL Leg Press - sled at 6; plates 6; 3 x 10  · SL Leg Press - sled at 6; plates 3; 3 x 10--> increase weight and decrease reps next session     Patient Education and Home Exercises     Home Exercises Provided and Patient Education Provided     Education provided:   Symptom management and plan of care progressions.  Home Exercise Program.    Written Home Exercises Provided: yes. Exercises were reviewed and Lu was able to demonstrate them prior to the end of the session.  Lu demonstrated good  understanding of the education provided. See EMR under Patient Instructions for exercises provided during therapy sessions    ASSESSMENT   Lu presents 9 wks s/p L TKA. She was able to tolerate increased load and depth in DL leg press and increased SL leg press without issue.  Patient fatigued quickly today with sit to stands.  Required rest break between and bilateral upper extremity support for second set of ten.  States "OK" after treatment.  No complaints of increased pain. She will benefit continued loading and progression with emphasis on maintaining terminal knee extension strength.     Lu Is progressing well towards her goals.   Pt prognosis is Good.     Pt will continue to benefit from skilled outpatient physical therapy to address the deficits listed in the problem list box on initial evaluation, provide pt/family education and to maximize pt's level of independence in the home and community environment.   Pt's spiritual, cultural and educational needs considered and pt agreeable to plan of care and goals.     Anticipated barriers to physical therapy: cardiac, post-surgery    Goals:  Short Term Goals: 3-4 weeks   1. Patient will have reduced pain complaints from 7/10 to less than or equal to 4/10. (Met - 6/3/2022)  2. Patient will demonstrate increased AROM/PROM by approximately " 25% to 50% of initial measurements. (Met - 6/3/2022)  3. Patient will demonstrate increased muscle strength of at 25% to 50% as compared to the initial measurements. (Met - 6/3/2022)     Long Term Goals: 6-8 weeks   1. Patient will have reduced pain complaints from 3/10 to less than or equal to 0/10. (Met - 6/3/2022)  2. Patient will demonstrate increased AROM/PROM by approximately 50% to 75% of initial measurements. (Not Met - Progressing)  3. Patient will demonstrate increased muscle strength of at least  50% to 75% as compared to the initial measurements. (Not Met - Progressing)  4. Patient will improve Knee FOTO limitation score from 67% to less than or equal to 46% (16 visits). (Not Met - Progressing)  5. Patient will be able to perform the 30 second sit to stand test with greater than or equal to 8 reps and normal biomechanics. (Not Met - Progressing)   6. Patient will be independent with their home exercise program. (Met - 6/3/2022)    PLAN     Continue with post-op TKA protocol.    Plan of care Certification: 4/25/2022 to 7/2/2022.  Outpatient Physical Therapy 2 times weekly for 6 weeks     Ana Tijerina PTA

## 2022-06-10 NOTE — PROGRESS NOTES
"[facility]   Physical Therapy Treatment Note     Name: Lu White  Clinic Number: [id number]    Therapy Diagnosis:   No diagnosis found.  Physician: Adam Garcia PA-C    Visit Date: 6/10/2022    Physician Orders: PT Eval and Treat  Medical Diagnosis from Referral: M17.12 (ICD-10-CM) - Unilateral primary osteoarthritis, left knee  Evaluation Date: 4/25/2022  Authorization Period Expiration: 5/23/2023  Plan of Care Expiration: 7/2/2022  Visit # / Visits authorized: 10/ 12   FOTO: 10/ 10  (Completed # 2  5/31/2022)***  PTA Visit #: 0/5     Time In: 1245 pm   Time Out: 0130 pm  Total Billable Time: 45 minutes (***)    Precautions: Standard and pacemaker     L TKA DOS: 4/7/2022    SUBJECTIVE     Pt reports: slight swelling still present but overall still improving  She was compliant with home exercise program.  Response to previous treatment:exhausted  Functional change: Able to get into car without having to open the door all the way.    Pain: 0/10  Location: Left Knee    OBJECTIVE     AROM:  Knee   Action Left Right   Flexion 107 degrees 105 degrees   Extension +1 degrees +1 degrees     Manual Muscle Testing:  Lower Extremity   Action Left Right   Hip Flexion 9.7 kg > 16.6 kg 11.6 kg   Hip Abduction 12.2 kg > 19.9 kg 12.7 kg   Knee Flexion 9.6 kg > 13.7 kg 13.6 kg   Knee Extension 11.2 kg > 15.3 kg 20.6 kg     · 30 Second sit to stand = 8 reps (no upper extremity use)    Treatment     Lu received the treatments listed below:      Therapeutic Exercises to develop strength, endurance, ROM and flexibility for 45 minutes including:  · Recumbent bike; Lv 3.0 for 5 min for increased CV endurance and tissue extensiblity  · Stair Flex/ Ext stretch; 4 x 30"   · Sit to stands - standard chair with no upper extremity support; 2x10- NP  · Heel Raises - 2 x 20  · Wedge calf stretch; 2 x 20" in between sets of heel raises  · Standing Hip abduction - green theraband 2 x 15 bilateral  " Standing Hip extension - green theraband 2 x 15 bilateral - NP  · Step Ups (forward and lateral left) - 6 in stair; x30 (unilateral upper extremity support - make sure she is not applying too much pressure through upper extremity))  · DL Leg Press - sled at 6; plates 6; 3 x 10  · SL Leg Press - sled at 6; plates 3; 3 x 10--> increase weight and decrease reps next session     Patient Education and Home Exercises     Home Exercises Provided and Patient Education Provided     Education provided:   Symptom management and plan of care progressions.  Home Exercise Program.    Written Home Exercises Provided: yes. Exercises were reviewed and Lu was able to demonstrate them prior to the end of the session.  Lu demonstrated good  understanding of the education provided. See EMR under Patient Instructions for exercises provided during therapy sessions    ASSESSMENT   Lu presents 9 wks s/p L TKA. She was able to tolerate increased load and depth in DL leg press and began SL leg press without issue. She will benefit continued loading and progression with emphasis on maintaining terminal knee extension strength.     Lu Is progressing well towards her goals.   Pt prognosis is Good.     Pt will continue to benefit from skilled outpatient physical therapy to address the deficits listed in the problem list box on initial evaluation, provide pt/family education and to maximize pt's level of independence in the home and community environment.   Pt's spiritual, cultural and educational needs considered and pt agreeable to plan of care and goals.     Anticipated barriers to physical therapy: cardiac, post-surgery    Goals:  Short Term Goals: 3-4 weeks   1. Patient will have reduced pain complaints from 7/10 to less than or equal to 4/10. (Met - 6/3/2022)  2. Patient will demonstrate increased AROM/PROM by approximately 25% to 50% of initial measurements. (Met - 6/3/2022)  3. Patient will demonstrate increased muscle  strength of at 25% to 50% as compared to the initial measurements. (Met - 6/3/2022)     Long Term Goals: 6-8 weeks   1. Patient will have reduced pain complaints from 3/10 to less than or equal to 0/10. (Met - 6/3/2022)  2. Patient will demonstrate increased AROM/PROM by approximately 50% to 75% of initial measurements. (Not Met - Progressing)  3. Patient will demonstrate increased muscle strength of at least  50% to 75% as compared to the initial measurements. (Not Met - Progressing)  4. Patient will improve Knee FOTO limitation score from 67% to less than or equal to 46% (16 visits). (Not Met - Progressing)  5. Patient will be able to perform the 30 second sit to stand test with greater than or equal to 8 reps and normal biomechanics. (Not Met - Progressing)   6. Patient will be independent with their home exercise program. (Met - 6/3/2022)    PLAN     Continue with post-op TKA protocol.    Plan of care Certification: 4/25/2022 to 7/2/2022.  Outpatient Physical Therapy 2 times weekly for 6 weeks     Meredith Hernandez, PT, DPT

## 2022-06-15 ENCOUNTER — CLINICAL SUPPORT (OUTPATIENT)
Dept: REHABILITATION | Facility: HOSPITAL | Age: 87
End: 2022-06-15
Payer: MEDICARE

## 2022-06-15 DIAGNOSIS — M25.662 DECREASED ROM OF LEFT KNEE: ICD-10-CM

## 2022-06-15 DIAGNOSIS — R26.2 DIFFICULTY WALKING: Primary | ICD-10-CM

## 2022-06-15 PROCEDURE — 97110 THERAPEUTIC EXERCISES: CPT | Mod: PN,CQ

## 2022-06-15 NOTE — PROGRESS NOTES
"[facility]   Physical Therapy Treatment Note     Name: Lu White  Clinic Number: [id number]    Therapy Diagnosis:   Encounter Diagnoses   Name Primary?    Difficulty walking Yes    Decreased ROM of left knee      Physician: Adam Garcia PA-C    Visit Date: 6/15/2022    Physician Orders: PT Eval and Treat  Medical Diagnosis from Referral: M17.12 (ICD-10-CM) - Unilateral primary osteoarthritis, left knee  Evaluation Date: 4/25/2022  Authorization Period Expiration: 5/23/2023  Plan of Care Expiration: 7/2/2022  Visit # / Visits authorized: 11/ 12   FOTO: 11/ 15  (Completed # 2  5/31/2022)  PTA Visit #: 1/5     Time In: 12:15 pm   Time Out:1:13 pm  Total Billable Time: 43 minutes (3 TE)    Precautions: Standard and pacemaker     L TKA DOS: 4/7/2022    SUBJECTIVE     Pt reports: continued improvement. States "a little pain but I feel like I'm pretty strong"  Patient inquired regarding how many more visits she needed to attend.  She was partially compliant with home exercise program.  Response to previous treatment:exhausted  Functional change: Able to get into car without having to open the door all the way.    Pain: 2/10  Location: Left Knee    OBJECTIVE     Objective measurements taken at Update unless otherwise noted.    Treatment     Lu received the treatments listed below:      Therapeutic Exercises to develop strength, endurance, ROM and flexibility for 43 minutes including:  · Recumbent bike; Lv 3.0 for 5 min for increased CV endurance and tissue extensiblity  · Stair Flex/ Ext stretch; 4 x 30"   · Sit to stands - standard chair with no upper extremity support; 1x10 Required upper extremity support 2nd set of 10  · Heel Raises - 2 x 20  · Wedge(fitter) calf stretch; 2 x 20" in between sets of heel raises  · Standing Hip abduction - green theraband 2 x 15 bilateral    · Standing Hip extension - green theraband 2 x 15 bilateral - NP  · Step Ups (forward and lateral left) - 6 " "in stair; x30 (unilateral upper extremity support - make sure she is not applying too much pressure through upper extremity))  · DL Leg Press - sled at 6; plates 6; 3 x 10  · SL Leg Press - sled at 6; plates 3; 3 x 10--> increase weight and decrease reps next session     Patient Education and Home Exercises     Home Exercises Provided and Patient Education Provided     Education provided:   Symptom management and plan of care progressions.  Home Exercise Program.    Written Home Exercises Provided: yes. Exercises were reviewed and Lu was able to demonstrate them prior to the end of the session.  Lu demonstrated good  understanding of the education provided. See EMR under Patient Instructions for exercises provided during therapy sessions    ASSESSMENT   Lu presents 10 wks s/p L TKA. She was able to tolerate increased load and depth in DL leg press and increased SL leg press without issue.  Patient again fatigued quickly today with sit to stands, required bilateral upper extremity support and only able to perform 10 reps..  States "OK" after treatment.  No complaints of increased pain. She will benefit continued loading and progression with emphasis on maintaining terminal knee extension strength.     Lu Is progressing well towards her goals.   Pt prognosis is Good.     Pt will continue to benefit from skilled outpatient physical therapy to address the deficits listed in the problem list box on initial evaluation, provide pt/family education and to maximize pt's level of independence in the home and community environment.   Pt's spiritual, cultural and educational needs considered and pt agreeable to plan of care and goals.     Anticipated barriers to physical therapy: cardiac, post-surgery    Goals:  Short Term Goals: 3-4 weeks   1. Patient will have reduced pain complaints from 7/10 to less than or equal to 4/10. (Met - 6/3/2022)  2. Patient will demonstrate increased AROM/PROM by approximately 25% " to 50% of initial measurements. (Met - 6/3/2022)  3. Patient will demonstrate increased muscle strength of at 25% to 50% as compared to the initial measurements. (Met - 6/3/2022)     Long Term Goals: 6-8 weeks   1. Patient will have reduced pain complaints from 3/10 to less than or equal to 0/10. (Met - 6/3/2022)  2. Patient will demonstrate increased AROM/PROM by approximately 50% to 75% of initial measurements. (Not Met - Progressing)  3. Patient will demonstrate increased muscle strength of at least  50% to 75% as compared to the initial measurements. (Not Met - Progressing)  4. Patient will improve Knee FOTO limitation score from 67% to less than or equal to 46% (16 visits). (Not Met - Progressing)  5. Patient will be able to perform the 30 second sit to stand test with greater than or equal to 8 reps and normal biomechanics. (Not Met - Progressing)   6. Patient will be independent with their home exercise program. (Met - 6/3/2022)    PLAN     Continue with post-op TKA protocol.    Plan of care Certification: 4/25/2022 to 7/2/2022.  Outpatient Physical Therapy 2 times weekly for 6 weeks     Ana Tijerina PTA

## 2022-06-17 ENCOUNTER — CLINICAL SUPPORT (OUTPATIENT)
Dept: REHABILITATION | Facility: HOSPITAL | Age: 87
End: 2022-06-17
Payer: MEDICARE

## 2022-06-17 DIAGNOSIS — M25.662 DECREASED ROM OF LEFT KNEE: ICD-10-CM

## 2022-06-17 DIAGNOSIS — R26.2 DIFFICULTY WALKING: Primary | ICD-10-CM

## 2022-06-17 PROCEDURE — 97110 THERAPEUTIC EXERCISES: CPT | Mod: PN

## 2022-06-17 NOTE — PROGRESS NOTES
[facility]   Physical Therapy Treatment Note     Name: Lu White  Clinic Number: [id number]    Therapy Diagnosis:   Encounter Diagnoses   Name Primary?    Difficulty walking Yes    Decreased ROM of left knee      Physician: No ref. provider found    Visit Date: 6/17/2022    Physician Orders: PT Eval and Treat  Medical Diagnosis from Referral: M17.12 (ICD-10-CM) - Unilateral primary osteoarthritis, left knee  Evaluation Date: 4/25/2022  Authorization Period Expiration: 5/23/2023  Plan of Care Expiration: 7/2/2022  Visit # / Visits authorized: 12/ 12   FOTO: 12/ 15  Discharge FOTO completed 6/17/22  PTA Visit #: 0/5     Time In: 1215 pm   Time Out: 0100 pm  Total Billable Time: 45 minutes (3 TE)    Precautions: Standard and pacemaker     L TKA DOS: 4/7/2022    SUBJECTIVE     Pt reports: she feels good and was able to ride her recumbent bike for 10 minutes yesterday at home without issue  She was partially compliant with home exercise program.  Response to previous treatment:exhausted  Functional change: Able to get into car without having to open the door all the way.    Pain: 0/10  Location: Left Knee    OBJECTIVE     Objective measurements taken at Update unless otherwise noted.    AROM:       Knee   Action Left Right   Flexion 108 degrees 105 degrees   Extension 0 degrees +1 degrees      Manual Muscle Testing:       Lower Extremity   Action Left Right   Hip Flexion  5.1 kg 4.9  kg   Knee Flexion 14.3 kg 14.0 kg   Knee Extension 14.6 kg 15.0 kg      · 30 Second sit to stand = 8 reps (no upper extremity use)  · TUG= 8.9 seconds w/o AD      Treatment     Lu received the treatments listed below:      Therapeutic Exercises to develop strength, endurance, ROM and flexibility for 45 minutes including updated measures and:  · Nustep bike; Lv 4.6 for 6 min for increased CV endurance and tissue extensibility  · Updated measures   · FOTO collected  · Discharge HEP education  · SL  "Sheyla 15x w/ 5" hold    Patient Education and Home Exercises     Home Exercises Provided and Patient Education Provided     Education provided:   Symptom management and plan of care progressions.  Home Exercise Program.    Written Home Exercises Provided: yes. Exercises were reviewed and Lu was able to demonstrate them prior to the end of the session.  Lu demonstrated good  understanding of the education provided. See EMR under Patient Instructions for exercises provided during therapy sessions. Updated 6/17/22    ASSESSMENT   Lu presents 10 wks s/p L TKA. She has met all goals at this time including strength and range of motion goals. She is satisfied with her progress and agreeable to discharge. She was given updated HEP and instructed to maintain exercise regimine to avoid losing strength in either leg. She is appropriate for discharge at this time.    Lu Is progressing well towards her goals.   Pt prognosis is Good.     Pt will continue to benefit from skilled outpatient physical therapy to address the deficits listed in the problem list box on initial evaluation, provide pt/family education and to maximize pt's level of independence in the home and community environment.   Pt's spiritual, cultural and educational needs considered and pt agreeable to plan of care and goals.     Anticipated barriers to physical therapy: cardiac, post-surgery    Goals:  Short Term Goals: 3-4 weeks   1. Patient will have reduced pain complaints from 7/10 to less than or equal to 4/10. MET 6/3/22  2. Patient will demonstrate increased AROM/PROM by approximately 25% to 50% of initial measurements. MET 6/3/22  3. Patient will demonstrate increased muscle strength of at 25% to 50% as compared to the initial measurements.  MET 6/3/22     Long Term Goals: 6-8 weeks   1. Patient will have reduced pain complaints from 3/10 to less than or equal to 0/10. MET 6/17/22  2. Patient will demonstrate increased AROM/PROM by " approximately 50% to 75% of initial measurements. MET 6/17/22  3. Patient will demonstrate increased muscle strength of at least  50% to 75% as compared to the initial measurements. MET 7/16/22  4. Patient will improve Knee FOTO limitation score from 67% to less than or equal to 46% (16 visits). MET (35%) 6/17/22  5. Patient will be able to perform the 30 second sit to stand test with greater than or equal to 8 reps and normal biomechanics. MET 6/17/22  6. Patient will be independent with their home exercise program. MET 6/3/22    PLAN     Discharge at this time    Plan of care Certification: 4/25/2022 to 7/2/2022.  Outpatient Physical Therapy 2 times weekly for 6 weeks     Meredith Hernandez, PT, DPT

## 2022-07-19 ENCOUNTER — PATIENT MESSAGE (OUTPATIENT)
Dept: RESEARCH | Facility: CLINIC | Age: 87
End: 2022-07-19
Payer: MEDICARE

## 2022-07-29 ENCOUNTER — CLINICAL SUPPORT (OUTPATIENT)
Dept: CARDIOLOGY | Facility: HOSPITAL | Age: 87
End: 2022-07-29
Payer: MEDICARE

## 2022-07-29 DIAGNOSIS — I49.5 SICK SINUS SYNDROME: ICD-10-CM

## 2022-07-29 DIAGNOSIS — Z95.0 PRESENCE OF CARDIAC PACEMAKER: ICD-10-CM

## 2022-07-29 DIAGNOSIS — I48.91 UNSPECIFIED ATRIAL FIBRILLATION: ICD-10-CM

## 2022-07-29 PROCEDURE — 93294 CARDIAC DEVICE CHECK - REMOTE: ICD-10-PCS | Mod: ,,, | Performed by: INTERNAL MEDICINE

## 2022-07-29 PROCEDURE — 93296 REM INTERROG EVL PM/IDS: CPT | Performed by: INTERNAL MEDICINE

## 2022-07-29 PROCEDURE — 93294 REM INTERROG EVL PM/LDLS PM: CPT | Mod: ,,, | Performed by: INTERNAL MEDICINE

## 2022-09-22 PROBLEM — Z96.652 S/P TOTAL KNEE REPLACEMENT USING CEMENT, LEFT: Status: ACTIVE | Noted: 2022-04-07

## 2022-10-27 ENCOUNTER — CLINICAL SUPPORT (OUTPATIENT)
Dept: CARDIOLOGY | Facility: HOSPITAL | Age: 87
End: 2022-10-27
Payer: MEDICARE

## 2022-10-27 DIAGNOSIS — Z95.0 PRESENCE OF CARDIAC PACEMAKER: ICD-10-CM

## 2022-10-27 PROCEDURE — 93296 REM INTERROG EVL PM/IDS: CPT | Performed by: INTERNAL MEDICINE

## 2023-01-25 ENCOUNTER — CLINICAL SUPPORT (OUTPATIENT)
Dept: CARDIOLOGY | Facility: HOSPITAL | Age: 88
End: 2023-01-25
Payer: MEDICARE

## 2023-01-25 DIAGNOSIS — I49.5 SICK SINUS SYNDROME: ICD-10-CM

## 2023-01-25 DIAGNOSIS — Z95.0 PRESENCE OF CARDIAC PACEMAKER: ICD-10-CM

## 2023-01-25 PROCEDURE — 93294 REM INTERROG EVL PM/LDLS PM: CPT | Mod: ,,, | Performed by: INTERNAL MEDICINE

## 2023-01-25 PROCEDURE — 93294 CARDIAC DEVICE CHECK - REMOTE: ICD-10-PCS | Mod: ,,, | Performed by: INTERNAL MEDICINE

## 2023-01-25 PROCEDURE — 93296 REM INTERROG EVL PM/IDS: CPT | Performed by: INTERNAL MEDICINE

## 2023-03-23 ENCOUNTER — LAB VISIT (OUTPATIENT)
Dept: LAB | Facility: HOSPITAL | Age: 88
End: 2023-03-23
Attending: INTERNAL MEDICINE
Payer: MEDICARE

## 2023-03-23 DIAGNOSIS — I10 PRIMARY HYPERTENSION: ICD-10-CM

## 2023-03-23 LAB
ALBUMIN SERPL BCP-MCNC: 3.8 G/DL (ref 3.5–5.2)
ALP SERPL-CCNC: 96 U/L (ref 55–135)
ALT SERPL W/O P-5'-P-CCNC: 15 U/L (ref 10–44)
ANION GAP SERPL CALC-SCNC: 9 MMOL/L (ref 8–16)
AST SERPL-CCNC: 21 U/L (ref 10–40)
BASOPHILS # BLD AUTO: 0.04 K/UL (ref 0–0.2)
BASOPHILS NFR BLD: 0.6 % (ref 0–1.9)
BILIRUB SERPL-MCNC: 0.4 MG/DL (ref 0.1–1)
BUN SERPL-MCNC: 24 MG/DL (ref 8–23)
CALCIUM SERPL-MCNC: 9.6 MG/DL (ref 8.7–10.5)
CHLORIDE SERPL-SCNC: 105 MMOL/L (ref 95–110)
CHOLEST SERPL-MCNC: 192 MG/DL (ref 120–199)
CHOLEST/HDLC SERPL: 2.7 {RATIO} (ref 2–5)
CO2 SERPL-SCNC: 28 MMOL/L (ref 23–29)
CREAT SERPL-MCNC: 0.8 MG/DL (ref 0.5–1.4)
DIFFERENTIAL METHOD: ABNORMAL
EOSINOPHIL # BLD AUTO: 0.2 K/UL (ref 0–0.5)
EOSINOPHIL NFR BLD: 2.6 % (ref 0–8)
ERYTHROCYTE [DISTWIDTH] IN BLOOD BY AUTOMATED COUNT: 13.6 % (ref 11.5–14.5)
EST. GFR  (NO RACE VARIABLE): >60 ML/MIN/1.73 M^2
GLUCOSE SERPL-MCNC: 76 MG/DL (ref 70–110)
HCT VFR BLD AUTO: 38.4 % (ref 37–48.5)
HDLC SERPL-MCNC: 70 MG/DL (ref 40–75)
HDLC SERPL: 36.5 % (ref 20–50)
HGB BLD-MCNC: 12 G/DL (ref 12–16)
IMM GRANULOCYTES # BLD AUTO: 0.02 K/UL (ref 0–0.04)
IMM GRANULOCYTES NFR BLD AUTO: 0.3 % (ref 0–0.5)
LDLC SERPL CALC-MCNC: 111 MG/DL (ref 63–159)
LYMPHOCYTES # BLD AUTO: 2.4 K/UL (ref 1–4.8)
LYMPHOCYTES NFR BLD: 38.6 % (ref 18–48)
MCH RBC QN AUTO: 29.1 PG (ref 27–31)
MCHC RBC AUTO-ENTMCNC: 31.3 G/DL (ref 32–36)
MCV RBC AUTO: 93 FL (ref 82–98)
MONOCYTES # BLD AUTO: 0.6 K/UL (ref 0.3–1)
MONOCYTES NFR BLD: 9.8 % (ref 4–15)
NEUTROPHILS # BLD AUTO: 3 K/UL (ref 1.8–7.7)
NEUTROPHILS NFR BLD: 48.1 % (ref 38–73)
NONHDLC SERPL-MCNC: 122 MG/DL
NRBC BLD-RTO: 0 /100 WBC
PLATELET # BLD AUTO: 222 K/UL (ref 150–450)
PMV BLD AUTO: 10 FL (ref 9.2–12.9)
POTASSIUM SERPL-SCNC: 3.7 MMOL/L (ref 3.5–5.1)
PROT SERPL-MCNC: 6.5 G/DL (ref 6–8.4)
RBC # BLD AUTO: 4.13 M/UL (ref 4–5.4)
SODIUM SERPL-SCNC: 142 MMOL/L (ref 136–145)
TRIGL SERPL-MCNC: 55 MG/DL (ref 30–150)
WBC # BLD AUTO: 6.24 K/UL (ref 3.9–12.7)

## 2023-03-23 PROCEDURE — 80053 COMPREHEN METABOLIC PANEL: CPT | Performed by: INTERNAL MEDICINE

## 2023-03-23 PROCEDURE — 36415 COLL VENOUS BLD VENIPUNCTURE: CPT | Mod: PO | Performed by: INTERNAL MEDICINE

## 2023-03-23 PROCEDURE — 80061 LIPID PANEL: CPT | Performed by: INTERNAL MEDICINE

## 2023-03-23 PROCEDURE — 85025 COMPLETE CBC W/AUTO DIFF WBC: CPT | Performed by: INTERNAL MEDICINE

## 2023-04-25 ENCOUNTER — CLINICAL SUPPORT (OUTPATIENT)
Dept: CARDIOLOGY | Facility: HOSPITAL | Age: 88
End: 2023-04-25
Payer: MEDICARE

## 2023-04-25 DIAGNOSIS — I48.91 UNSPECIFIED ATRIAL FIBRILLATION: ICD-10-CM

## 2023-04-25 DIAGNOSIS — I49.5 SICK SINUS SYNDROME: ICD-10-CM

## 2023-04-25 DIAGNOSIS — Z95.0 PRESENCE OF CARDIAC PACEMAKER: ICD-10-CM

## 2023-04-25 PROCEDURE — 93296 REM INTERROG EVL PM/IDS: CPT | Performed by: INTERNAL MEDICINE

## 2023-04-27 ENCOUNTER — OFFICE VISIT (OUTPATIENT)
Dept: CARDIOLOGY | Facility: CLINIC | Age: 88
End: 2023-04-27
Payer: MEDICARE

## 2023-04-27 VITALS
HEIGHT: 64 IN | BODY MASS INDEX: 23.37 KG/M2 | DIASTOLIC BLOOD PRESSURE: 65 MMHG | SYSTOLIC BLOOD PRESSURE: 135 MMHG | WEIGHT: 136.88 LBS | HEART RATE: 60 BPM

## 2023-04-27 DIAGNOSIS — I49.8 OTHER CARDIAC ARRHYTHMIA: ICD-10-CM

## 2023-04-27 DIAGNOSIS — E78.2 MIXED HYPERLIPIDEMIA: ICD-10-CM

## 2023-04-27 DIAGNOSIS — R00.1 SYMPTOMATIC BRADYCARDIA: ICD-10-CM

## 2023-04-27 DIAGNOSIS — I49.8 OTHER CARDIAC ARRHYTHMIA: Primary | ICD-10-CM

## 2023-04-27 DIAGNOSIS — N18.31 STAGE 3A CHRONIC KIDNEY DISEASE: ICD-10-CM

## 2023-04-27 DIAGNOSIS — I10 PRIMARY HYPERTENSION: Primary | ICD-10-CM

## 2023-04-27 DIAGNOSIS — Z95.0 CARDIAC PACEMAKER IN SITU: ICD-10-CM

## 2023-04-27 DIAGNOSIS — I49.5 SSS (SICK SINUS SYNDROME): ICD-10-CM

## 2023-04-27 PROCEDURE — 3288F PR FALLS RISK ASSESSMENT DOCUMENTED: ICD-10-PCS | Mod: CPTII,S$GLB,, | Performed by: INTERNAL MEDICINE

## 2023-04-27 PROCEDURE — 93005 RHYTHM STRIP: ICD-10-PCS | Mod: S$GLB,,, | Performed by: INTERNAL MEDICINE

## 2023-04-27 PROCEDURE — 1126F AMNT PAIN NOTED NONE PRSNT: CPT | Mod: CPTII,S$GLB,, | Performed by: INTERNAL MEDICINE

## 2023-04-27 PROCEDURE — 1159F PR MEDICATION LIST DOCUMENTED IN MEDICAL RECORD: ICD-10-PCS | Mod: CPTII,S$GLB,, | Performed by: INTERNAL MEDICINE

## 2023-04-27 PROCEDURE — 99214 PR OFFICE/OUTPT VISIT, EST, LEVL IV, 30-39 MIN: ICD-10-PCS | Mod: S$GLB,,, | Performed by: INTERNAL MEDICINE

## 2023-04-27 PROCEDURE — 1101F PT FALLS ASSESS-DOCD LE1/YR: CPT | Mod: CPTII,S$GLB,, | Performed by: INTERNAL MEDICINE

## 2023-04-27 PROCEDURE — 1160F PR REVIEW ALL MEDS BY PRESCRIBER/CLIN PHARMACIST DOCUMENTED: ICD-10-PCS | Mod: CPTII,S$GLB,, | Performed by: INTERNAL MEDICINE

## 2023-04-27 PROCEDURE — 1160F RVW MEDS BY RX/DR IN RCRD: CPT | Mod: CPTII,S$GLB,, | Performed by: INTERNAL MEDICINE

## 2023-04-27 PROCEDURE — 93010 RHYTHM STRIP: ICD-10-PCS | Mod: S$GLB,,, | Performed by: INTERNAL MEDICINE

## 2023-04-27 PROCEDURE — 99214 OFFICE O/P EST MOD 30 MIN: CPT | Mod: S$GLB,,, | Performed by: INTERNAL MEDICINE

## 2023-04-27 PROCEDURE — 1159F MED LIST DOCD IN RCRD: CPT | Mod: CPTII,S$GLB,, | Performed by: INTERNAL MEDICINE

## 2023-04-27 PROCEDURE — 99999 PR PBB SHADOW E&M-EST. PATIENT-LVL III: CPT | Mod: PBBFAC,,, | Performed by: INTERNAL MEDICINE

## 2023-04-27 PROCEDURE — 93010 ELECTROCARDIOGRAM REPORT: CPT | Mod: S$GLB,,, | Performed by: INTERNAL MEDICINE

## 2023-04-27 PROCEDURE — 99999 PR PBB SHADOW E&M-EST. PATIENT-LVL III: ICD-10-PCS | Mod: PBBFAC,,, | Performed by: INTERNAL MEDICINE

## 2023-04-27 PROCEDURE — 1126F PR PAIN SEVERITY QUANTIFIED, NO PAIN PRESENT: ICD-10-PCS | Mod: CPTII,S$GLB,, | Performed by: INTERNAL MEDICINE

## 2023-04-27 PROCEDURE — 93005 ELECTROCARDIOGRAM TRACING: CPT | Mod: S$GLB,,, | Performed by: INTERNAL MEDICINE

## 2023-04-27 PROCEDURE — 3288F FALL RISK ASSESSMENT DOCD: CPT | Mod: CPTII,S$GLB,, | Performed by: INTERNAL MEDICINE

## 2023-04-27 PROCEDURE — 1101F PR PT FALLS ASSESS DOC 0-1 FALLS W/OUT INJ PAST YR: ICD-10-PCS | Mod: CPTII,S$GLB,, | Performed by: INTERNAL MEDICINE

## 2023-04-27 NOTE — PROGRESS NOTES
Subjective:   Patient ID:  Lu White is an 89 y.o. female who presents for follow-up of SSS/PPM.     HPI:    Ms. White is an 89 y.o. female with HTN, HLD, , bradycardia s/p PPM here for follow up after pacemaker implantation.    HTN on meds  HL on meds    Chronic episodic SOB/LAM for years. No syncope/presync/LH.  Holter monitor: SR,  bpm. Junctional at times, including during symptoms during daytime hours.  echo 6/21 55%  PET stress neg  ECG is SB 45 bpm  Plan dual chamber PPM.  11/1/2021: Successful implantation of PPM Dual.    Update (04/27/2023):    Today she says she has been feeling well. More energy. Much better since PPM implanted. No cardiac complaints. Ms. White reports no chest pain with exertion or at rest, palpitations, SOB, LAM, dizziness, or syncope.    She feels great. Walks around big stores for exercise.  PPM: good function.    My interpretation of today's ECG is APVS    Relevant Cardiac Test Results:  2D Echo (6/17/2021):  The left ventricle is normal in size with normal systolic function.  The estimated ejection fraction is 55%.  Grade I left ventricular diastolic dysfunction.  Normal right ventricular size with normal right ventricular systolic function.  The estimated PA systolic pressure is 39 mmHg.  Normal central venous pressure (3 mmHg).    Current Outpatient Medications   Medication Sig    acetaminophen (TYLENOL) 500 MG tablet Take 425 mg by mouth daily as needed.    aspirin 325 MG tablet Take 325 mg by mouth once daily. 1/2 tablet daily    calcium-vitamin D3 (OS-VIJAY 500 + D3) 500 mg(1,250mg) -200 unit per tablet Take 1 tablet by mouth 2 (two) times daily with meals.    ginkgo biloba 120 mg Tab Take 1 tablet by mouth once daily.    hydroCHLOROthiazide (HYDRODIURIL) 25 MG tablet Take 1 tablet (25 mg total) by mouth once daily.    lovastatin (MEVACOR) 20 MG tablet Take 1 tablet (20 mg total) by mouth every evening.    multivitamin with minerals tablet Take 1 tablet by mouth  "once daily.    temazepam (RESTORIL) 15 mg Cap TAKE 1 CAPSULE (15 MG TOTAL) BY MOUTH EVERY EVENING.     No current facility-administered medications for this visit.       Review of Systems   Constitutional: Negative. Negative for malaise/fatigue.   HENT: Negative.  Negative for ear pain and tinnitus.    Eyes:  Negative for blurred vision.   Cardiovascular: Negative.  Negative for chest pain, dyspnea on exertion, irregular heartbeat, leg swelling, near-syncope, palpitations and syncope.   Respiratory: Negative.  Negative for shortness of breath.    Endocrine: Negative.  Negative for polyuria.   Hematologic/Lymphatic: Negative for bleeding problem. Does not bruise/bleed easily.   Skin: Negative.  Negative for rash.   Musculoskeletal: Negative.  Negative for joint pain, muscle weakness and myalgias.   Gastrointestinal: Negative.  Negative for abdominal pain, change in bowel habit, hematemesis, hematochezia and nausea.   Genitourinary:  Negative for frequency and hematuria.   Neurological: Negative.  Negative for dizziness, light-headedness and weakness.   Psychiatric/Behavioral: Negative.  Negative for altered mental status and depression. The patient is not nervous/anxious.    Allergic/Immunologic: Negative for environmental allergies and persistent infections.     Objective:          /65   Pulse 60   Ht 5' 4" (1.626 m)   Wt 62.1 kg (136 lb 14.5 oz)   LMP  (LMP Unknown)   BMI 23.50 kg/m²     Physical Exam  Vitals and nursing note reviewed.   Constitutional:       Appearance: Normal appearance. She is well-developed.   HENT:      Head: Normocephalic and atraumatic.      Nose: Nose normal.   Eyes:      Conjunctiva/sclera: Conjunctivae normal.      Pupils: Pupils are equal, round, and reactive to light.   Neck:      Thyroid: No thyroid mass or thyromegaly.      Trachea: No tracheal deviation.   Cardiovascular:      Rate and Rhythm: Normal rate and regular rhythm.   Pulmonary:      Effort: Pulmonary effort is " normal. No respiratory distress.      Breath sounds: No wheezing.   Chest:      Comments: Device to LUCW. Incision and pocket in good repair.    Abdominal:      General: There is no distension.   Musculoskeletal:         General: Normal range of motion.      Cervical back: Normal range of motion. No edema.   Skin:     General: Skin is warm and dry.      Findings: No erythema or rash.   Neurological:      Mental Status: She is alert and oriented to person, place, and time.      Coordination: Coordination normal.   Psychiatric:         Speech: Speech normal.         Behavior: Behavior normal. Behavior is cooperative.         Thought Content: Thought content normal.     Lab Results   Component Value Date     03/23/2023    K 3.7 03/23/2023    BUN 24 (H) 03/23/2023    CREATININE 0.8 03/23/2023    ALT 15 03/23/2023    AST 21 03/23/2023    HGB 12.0 03/23/2023    HCT 38.4 03/23/2023    TSH 2.099 09/23/2021    LDLCALC 111.0 03/23/2023       Recent Labs   Lab 10/29/21  0940   INR 1.0         Assessment:     1. Primary hypertension    2. SSS (sick sinus syndrome)    3. Other cardiac arrhythmia    4. Mixed hyperlipidemia    5. Symptomatic bradycardia    6. Cardiac pacemaker in situ    7. Stage 3a chronic kidney disease      Plan:     In summary, Ms. White is an 89 y.o. female with HTN, HLD, , bradycardia s/p PPM here for follow up after pacemaker implantation.    Ms. White is doing well from a device perspective with stable lead and device function. No arrhythmia noted. No RV pacing. She is doing well.    Continue current medication regimen and device settings.   Follow up in device clinic as scheduled.   Follow up in EP clinic in 1 year, sooner as needed.

## 2023-07-24 ENCOUNTER — CLINICAL SUPPORT (OUTPATIENT)
Dept: CARDIOLOGY | Facility: HOSPITAL | Age: 88
End: 2023-07-24
Payer: MEDICARE

## 2023-07-24 DIAGNOSIS — R00.1 BRADYCARDIA, UNSPECIFIED: ICD-10-CM

## 2023-07-24 DIAGNOSIS — Z95.0 PRESENCE OF CARDIAC PACEMAKER: ICD-10-CM

## 2023-07-24 DIAGNOSIS — I49.5 SICK SINUS SYNDROME: ICD-10-CM

## 2023-07-24 PROCEDURE — 93294 CARDIAC DEVICE CHECK - REMOTE: ICD-10-PCS | Mod: ,,, | Performed by: INTERNAL MEDICINE

## 2023-07-24 PROCEDURE — 93294 REM INTERROG EVL PM/LDLS PM: CPT | Mod: ,,, | Performed by: INTERNAL MEDICINE

## 2023-07-24 PROCEDURE — 93296 REM INTERROG EVL PM/IDS: CPT | Performed by: INTERNAL MEDICINE

## 2023-10-22 ENCOUNTER — CLINICAL SUPPORT (OUTPATIENT)
Dept: CARDIOLOGY | Facility: HOSPITAL | Age: 88
End: 2023-10-22
Payer: MEDICARE

## 2023-10-22 DIAGNOSIS — I50.9 HEART FAILURE, UNSPECIFIED: ICD-10-CM

## 2023-10-22 DIAGNOSIS — I49.5 SICK SINUS SYNDROME: ICD-10-CM

## 2023-10-22 DIAGNOSIS — Z95.0 PRESENCE OF CARDIAC PACEMAKER: ICD-10-CM

## 2023-10-22 PROCEDURE — 93296 REM INTERROG EVL PM/IDS: CPT | Performed by: INTERNAL MEDICINE

## 2023-11-26 ENCOUNTER — HOSPITAL ENCOUNTER (EMERGENCY)
Facility: HOSPITAL | Age: 88
Discharge: HOME | End: 2023-11-26
Attending: EMERGENCY MEDICINE
Payer: MEDICARE

## 2023-11-26 VITALS
DIASTOLIC BLOOD PRESSURE: 87 MMHG | WEIGHT: 135 LBS | SYSTOLIC BLOOD PRESSURE: 155 MMHG | RESPIRATION RATE: 20 BRPM | OXYGEN SATURATION: 100 % | TEMPERATURE: 99 F | BODY MASS INDEX: 23.17 KG/M2 | HEART RATE: 60 BPM

## 2023-11-26 DIAGNOSIS — T14.90XA INJURY: ICD-10-CM

## 2023-11-26 DIAGNOSIS — S52.601A CLOSED FRACTURE OF DISTAL END OF RIGHT ULNA, UNSPECIFIED FRACTURE MORPHOLOGY, INITIAL ENCOUNTER: ICD-10-CM

## 2023-11-26 DIAGNOSIS — S00.03XA HEMATOMA OF SCALP, INITIAL ENCOUNTER: ICD-10-CM

## 2023-11-26 DIAGNOSIS — S00.01XA ABRASION OF SCALP, INITIAL ENCOUNTER: ICD-10-CM

## 2023-11-26 DIAGNOSIS — S52.501A CLOSED FRACTURE OF DISTAL END OF RIGHT RADIUS, UNSPECIFIED FRACTURE MORPHOLOGY, INITIAL ENCOUNTER: Primary | ICD-10-CM

## 2023-11-26 PROCEDURE — 63600175 PHARM REV CODE 636 W HCPCS: Performed by: EMERGENCY MEDICINE

## 2023-11-26 PROCEDURE — 99285 EMERGENCY DEPT VISIT HI MDM: CPT | Mod: 25

## 2023-11-26 PROCEDURE — 96372 THER/PROPH/DIAG INJ SC/IM: CPT | Performed by: EMERGENCY MEDICINE

## 2023-11-26 PROCEDURE — 25000003 PHARM REV CODE 250: Performed by: EMERGENCY MEDICINE

## 2023-11-26 PROCEDURE — 29125 APPL SHORT ARM SPLINT STATIC: CPT | Mod: RT

## 2023-11-26 RX ORDER — FENTANYL CITRATE 50 UG/ML
100 INJECTION, SOLUTION INTRAMUSCULAR; INTRAVENOUS
Status: COMPLETED | OUTPATIENT
Start: 2023-11-26 | End: 2023-11-26

## 2023-11-26 RX ORDER — OXYCODONE AND ACETAMINOPHEN 5; 325 MG/1; MG/1
1 TABLET ORAL EVERY 6 HOURS PRN
Qty: 12 TABLET | Refills: 0 | Status: SHIPPED | OUTPATIENT
Start: 2023-11-26 | End: 2023-12-07 | Stop reason: HOSPADM

## 2023-11-26 RX ORDER — ONDANSETRON 4 MG/1
4 TABLET, ORALLY DISINTEGRATING ORAL EVERY 8 HOURS PRN
Qty: 12 TABLET | Refills: 0 | Status: SHIPPED | OUTPATIENT
Start: 2023-11-26 | End: 2024-02-26

## 2023-11-26 RX ORDER — IBUPROFEN 600 MG/1
600 TABLET ORAL
Status: COMPLETED | OUTPATIENT
Start: 2023-11-26 | End: 2023-11-26

## 2023-11-26 RX ORDER — NAPROXEN 500 MG/1
500 TABLET ORAL 2 TIMES DAILY WITH MEALS
Qty: 20 TABLET | Refills: 0 | Status: SHIPPED | OUTPATIENT
Start: 2023-11-26 | End: 2023-12-07 | Stop reason: HOSPADM

## 2023-11-26 RX ORDER — OXYCODONE AND ACETAMINOPHEN 5; 325 MG/1; MG/1
1 TABLET ORAL
Status: COMPLETED | OUTPATIENT
Start: 2023-11-26 | End: 2023-11-26

## 2023-11-26 RX ADMIN — BACITRACIN ZINC, NEOMYCIN, POLYMYXIN B 1 EACH: 400; 3.5; 5 OINTMENT TOPICAL at 03:11

## 2023-11-26 RX ADMIN — IBUPROFEN 600 MG: 600 TABLET, FILM COATED ORAL at 03:11

## 2023-11-26 RX ADMIN — OXYCODONE HYDROCHLORIDE AND ACETAMINOPHEN 1 TABLET: 5; 325 TABLET ORAL at 03:11

## 2023-11-26 RX ADMIN — FENTANYL CITRATE 100 MCG: 50 INJECTION INTRAMUSCULAR; INTRAVENOUS at 02:11

## 2023-11-26 NOTE — FIRST PROVIDER EVALUATION
Emergency Department TeleTriage Encounter Note      CHIEF COMPLAINT    Chief Complaint   Patient presents with    Fall     This morning tripped and fell at home. Bump and wound to right sided forehead. Deformity noted to right wrist. No history of blood thinners.       VITAL SIGNS   Initial Vitals [11/26/23 1152]   BP Pulse Resp Temp SpO2   (!) 194/91 60 18 98.6 °F (37 °C) 98 %      MAP       --            ALLERGIES    Review of patient's allergies indicates:  No Known Allergies    PROVIDER TRIAGE NOTE  Verbal consent for the teletriage evaluation was given by the patient at the start of the evaluation.  All efforts will be made to maintain patient's privacy during the evaluation.      This is a teletriage evaluation of a 89 y.o. female presenting to the ED with c/o trip and fall.  C/O right forehead pain and right wrist pain.  No LOC.  Denies neck pain/back pain.  Takes ASA daily.  Limited physical exam via telehealth: The patient is awake, alert, answering questions appropriately and is not in respiratory distress.  As the Teletriage provider, I performed an initial assessment and ordered appropriate labs and imaging studies, if any, to facilitate the patient's care once placed in the ED. Once a room is available, care and a full evaluation will be completed by an alternate ED provider.  Any additional orders and the final disposition will be determined by that provider.  All imaging and labs will not be followed-up by the Teletriage Team, including myself.          ORDERS  Labs Reviewed - No data to display    ED Orders (720h ago, onward)      Start Ordered     Status Ordering Provider    11/26/23 1204 11/26/23 1203  CT Head Without Contrast  1 time imaging         Ordered KRISTEN MIRANDA    11/26/23 1204 11/26/23 1203  X-Ray Wrist Complete Right  1 time imaging         Ordered KRISTEN MIRANDA    11/26/23 1204 11/26/23 1203  X-Ray Hand 3 view Right  1 time imaging         Ordered KRISTEN MIRANDA    11/26/23 1204  11/26/23 1203  Ice to affected area  Once         Ordered KRISTEN MIRANDA A              Virtual Visit Note: The provider triage portion of this emergency department evaluation and documentation was performed via [employer], a HIPAA-compliant telemedicine application, in concert with a tele-presenter in the room. A face to face patient evaluation with one of my colleagues will occur once the patient is placed in an emergency department room.      DISCLAIMER: This note was prepared with [employer] voice recognition transcription software. Garbled syntax, mangled pronouns, and other bizarre constructions may be attributed to that software system.

## 2023-11-26 NOTE — ED PROVIDER NOTES
Encounter Date: 11/26/2023       History     Chief Complaint   Patient presents with    Fall     This morning tripped and fell at home. Bump and wound to right sided forehead. Deformity noted to right wrist. No history of blood thinners.     89-year-old female presents for mechanical trip and fall while pushing a cart into her home when the car gave way she fell forward onto outstretched right hand and hit the right frontal scalp on the ground.  No LOC.  No anticoagulant    The history is provided by the patient.     Review of patient's allergies indicates:  No Known Allergies  Past Medical History:   Diagnosis Date    Arthritis     Diverticular disease     Hyperlipidemia     Hypertension      Past Surgical History:   Procedure Laterality Date    A-V CARDIAC PACEMAKER INSERTION N/A 11/1/2021    Procedure: INSERTION, CARDIAC PACEMAKER, DUAL CHAMBER;  Surgeon: Brayden Coats MD;  Location: [facility] EP LAB;  Service: Cardiology;  Laterality: N/A;  SB, Dual PPM, SJM, MAC, DM, 3 Prep    EYE SURGERY      both eyes catarac    HYSTERECTOMY  1970    OVARY SURGERY      ROTATOR CUFF REPAIR Right     TOTAL KNEE REPLACEMENT USING COMPUTER NAVIGATION Right     TOTAL SHOULDER ARTHROPLASTY Right      Family History   Problem Relation Age of Onset    Heart disease Father      Social History     Tobacco Use    Smoking status: Never    Smokeless tobacco: Never   Substance Use Topics    Alcohol use: No    Drug use: No     Review of Systems    Physical Exam     Initial Vitals [11/26/23 1152]   BP Pulse Resp Temp SpO2   (!) 194/91 60 18 98.6 °F (37 °C) 98 %      MAP       --         Physical Exam    Nursing note and vitals reviewed.  Constitutional: She appears well-developed and well-nourished. No distress.   HENT:   Head: Normocephalic.   Right frontal hematoma and abrasion   Eyes: Conjunctivae and EOM are normal. Pupils are equal, round, and reactive to light.   Neck: Neck supple.   Normal range of motion.  Cardiovascular:  Intact  distal pulses.           Pulmonary/Chest: No stridor. No respiratory distress.   Musculoskeletal:         General: Tenderness and edema present.      Cervical back: Normal range of motion and neck supple.      Comments: Right wrist deformity and swelling.  Extremity neurovascularly intact     Neurological: She is alert and oriented to person, place, and time.   Skin: Skin is warm and dry.   Psychiatric: She has a normal mood and affect.         ED Course   Splint Application    Date/Time: 11/26/2023 2:41 PM    Performed by: Serge Mckeon DO  Authorized by: Serge Mckeon,   Location details: right wrist  Splint type: volar short arm  Supplies used: cotton padding, elastic bandage and Ortho-Glass  Post-procedure: The splinted body part was neurovascularly unchanged following the procedure.  Patient tolerance: Patient tolerated the procedure well with no immediate complications        Labs Reviewed - No data to display       Imaging Results              X-Ray Wrist Complete Right (Final result)  Result time 11/26/23 13:27:21      Final result by Adam Sherwood MD (11/26/23 13:27:21)                   Impression:      1. Fractures of the distal radius and ulna as described.      Electronically signed by: Adam Sherwood MD  Date:    11/26/2023  Time:    13:27               Narrative:    EXAMINATION:  XR HAND COMPLETE 3 VIEW RIGHT; XR WRIST COMPLETE 3 VIEWS RIGHT    CLINICAL HISTORY:  injury; Injury, unspecified, initial encounter    TECHNIQUE:  PA, lateral, and oblique views of the right hand were performed.  Three views right wrist.    COMPARISON:  None    FINDINGS:  Three views right wrist, three views right hand.    There is a comminuted impacted intra-articular fracture involving the distal aspect of the right radius.  There is an impacted comminuted fracture involving the distal aspect of the right ulna.  There are degenerative changes of the PIP and D IP joints.  There is osteopenia.  No  acute displaced fracture or dislocation of the hand.  There is diffuse edema about the dorsal aspect of the wrist.  No radiopaque foreign body.                                       X-Ray Hand 3 view Right (Final result)  Result time 11/26/23 13:27:21      Final result by Adam Sherwood MD (11/26/23 13:27:21)                   Impression:      1. Fractures of the distal radius and ulna as described.      Electronically signed by: Adam Sherwood MD  Date:    11/26/2023  Time:    13:27               Narrative:    EXAMINATION:  XR HAND COMPLETE 3 VIEW RIGHT; XR WRIST COMPLETE 3 VIEWS RIGHT    CLINICAL HISTORY:  injury; Injury, unspecified, initial encounter    TECHNIQUE:  PA, lateral, and oblique views of the right hand were performed.  Three views right wrist.    COMPARISON:  None    FINDINGS:  Three views right wrist, three views right hand.    There is a comminuted impacted intra-articular fracture involving the distal aspect of the right radius.  There is an impacted comminuted fracture involving the distal aspect of the right ulna.  There are degenerative changes of the PIP and D IP joints.  There is osteopenia.  No acute displaced fracture or dislocation of the hand.  There is diffuse edema about the dorsal aspect of the wrist.  No radiopaque foreign body.                                       CT Head Without Contrast (Final result)  Result time 11/26/23 13:18:23      Final result by Ryne Delgado MD (11/26/23 13:18:23)                   Impression:      No CT evidence of acute intracranial abnormality.  Small right frontal scalp hematoma.    Generalized cerebral volume loss and chronic ischemic changes.    Small right mastoid effusion.      Electronically signed by: Ryne Delgado MD  Date:    11/26/2023  Time:    13:18               Narrative:    EXAMINATION:  CT HEAD WITHOUT CONTRAST    CLINICAL HISTORY:  Head trauma, minor (Age >= 65y);    TECHNIQUE:  Low dose axial images were obtained through  the head.  Coronal and sagittal reformations were also performed. Contrast was not administered.    COMPARISON:  02/21/2022.    FINDINGS:  Generalized cerebral volume loss with ex vacuo dilation of the ventricles and sulci.  Patchy periventricular white matter hypoattenuation suggestive of chronic microvascular ischemic change.  Few scattered remote lacunar infarcts as seen previously.    No evidence of acute territorial infarct, hemorrhage, mass effect, or midline shift.    Ventricles are similar in size and configuration when compared with prior study.    No displaced calvarial fracture.  Right frontal scalp soft tissue edema/scalp hematoma.    Small volume right mastoid effusion.  Left mastoid air cells are essentially clear.  Visualized paranasal sinuses are essentially clear.                                    X-Rays:   Independently Interpreted Readings:   Head CT: No hemorrhage.  No skull fracture.     Medications   oxyCODONE-acetaminophen 5-325 mg per tablet 1 tablet (has no administration in time range)   ibuprofen tablet 600 mg (has no administration in time range)   neomycin-bacitracnZn-polymyxnB packet 1 each (has no administration in time range)   fentaNYL 50 mcg/mL injection 100 mcg (100 mcg Intramuscular Given 11/26/23 1400)     Medical Decision Making  Presentation concerning for head injury rule out skull fracture, intracranial hemorrhage.  Right wrist with obvious fracture.    Amount and/or Complexity of Data Reviewed  Radiology: ordered and independent interpretation performed.     Details: Right wrist film with distal radius and ulnar fractures identified    Risk  Prescription drug management.               ED Course as of 11/26/23 1445   Sun Nov 26, 2023   1442 Patient's splinted in anatomical position.  Neurovascularly intact.  Counseled patient to follow-up with orthopedics and referral order provided.  Scalp cleaned and irrigated not amenable to primary closure.  Recommend buttock ointment  until healed [AP]      ED Course User Index  [AP] Serge Mckeon,                           Clinical Impression:  Final diagnoses:  [T14.90XA] Injury  [S52.501A] Closed fracture of distal end of right radius, unspecified fracture morphology, initial encounter (Primary)  [S52.601A] Closed fracture of distal end of right ulna, unspecified fracture morphology, initial encounter  [S00.03XA] Hematoma of scalp, initial encounter  [S00.01XA] Abrasion of scalp, initial encounter          ED Disposition Condition    Discharge Stable          ED Prescriptions       Medication Sig Dispense Start Date End Date Auth. Provider    oxyCODONE-acetaminophen (PERCOCET) 5-325 mg per tablet Take 1 tablet by mouth every 6 (six) hours as needed for Pain. 12 tablet 11/26/2023 -- Serge Mckeon DO    naproxen (NAPROSYN) 500 MG tablet Take 1 tablet (500 mg total) by mouth 2 (two) times daily with meals. 20 tablet 11/26/2023 -- Serge Mckeon,     ondansetron (ZOFRAN-ODT) 4 MG TbDL Take 1 tablet (4 mg total) by mouth every 8 (eight) hours as needed (nausea). 12 tablet 11/26/2023 -- Serge Mckeon DO          Follow-up Information       Follow up With Specialties Details Why Contact Info Additional Information    [facility] Orth[facility] In 1 week  [address] [place]  [address]  [facility] [address] [phone] Please park in [address] or [address] and use [address] sams. Take Medical Office Bldg. elevators.    [facility] - Emergency Dept Emergency Medicine  If symptoms worsen [address] [facility] [address] [phone]              Serge Mckeon DO  11/26/23 8724

## 2023-11-29 ENCOUNTER — TELEPHONE (OUTPATIENT)
Dept: ORTHOPEDICS | Facility: CLINIC | Age: 88
End: 2023-11-29
Payer: MEDICARE

## 2023-11-29 NOTE — TELEPHONE ENCOUNTER
Spoke with the patient's daughter, offered appts Thurs/Fri/Mon/Tues - patient prefers Tuesday at [facility] [place], [place]. Appt scheduled, All questions answered. Patient verbalized understanding and was thankful.     Elizabeth Love, MS, OTC  Clinical & OR Assistant to Dr. Ross Dunbar [facility]  [phone]    ----- Message from Halle Abel sent at 11/29/2023 10:32 AM CST -----  Regarding: RE: [facility] Appt Request  Distal radius  ----- Message -----  From: Samson Toney  Sent: 11/29/2023  10:24 AM CST  To: Nia Goncalves; Halle Abel  Subject: FW: [facility] Appt Request                          Who is next in line?  ----- Message -----  From: Cely Wallis LPN  Sent: 11/29/2023  10:09 AM CST  To: Jesus Quiros Staff  Subject: FW: [facility] Appt Request                          Good Morning,     Can someone please assist with getting patient scheduled? Dr. Zhou doesn't have any current availability, and patient doesn't want to go to [place]. Please advise.     Thanks!  ----- Message -----  From: Enedina Ramachandran  Sent: 11/29/2023   9:44 AM CST  To: Abelardo CRUZ Staff  Subject: [facility] Appt Request                              Who Is Calling : Daughter of SOPHIA GONZALEZ [[id number]]        Reason For The Call: Patient is requesting a sooner appointment.  Patient declined first available and does not want to be added to the waitlist.  Please contact the patient to schedule.        Preferred Contact Method: [phone] Sydnee        Additional Information: Patient was seen in ER for fractured wrist and will need to be seen as soon as possible

## 2023-11-29 NOTE — TELEPHONE ENCOUNTER
----- Message from Enedina Ramachandran sent at 11/29/2023  9:43 AM CST -----  Regarding: [facility] Appt Request  Who Is Calling : Daughter of SOPHIA GONZALEZ [[id number]]        Reason For The Call: Patient is requesting a sooner appointment.  Patient declined first available and does not want to be added to the waitlist.  Please contact the patient to schedule.        Preferred Contact Method: [phone] Sydnee        Additional Information: Patient was seen in ER for fractured wrist and will need to be seen as soon as possible

## 2023-11-29 NOTE — TELEPHONE ENCOUNTER
Spoke with Ana White, in regards to getting an appointment with Dr. Zhou for her wrist fracture. I informed patient that unfortunately Dr. Zhou doesn't have any current availability. I also informed patient that a message will be sent over to the [place] location for scheduling, and someone will reach out for an appointment. Message sent over to Dr. Quiros's office for scheduling. All questions answered and verbalization expressed.

## 2023-12-05 ENCOUNTER — HOSPITAL ENCOUNTER (OUTPATIENT)
Dept: RADIOLOGY | Facility: HOSPITAL | Age: 88
Discharge: HOME | End: 2023-12-05
Attending: PHYSICIAN ASSISTANT
Payer: MEDICARE

## 2023-12-05 ENCOUNTER — TELEPHONE (OUTPATIENT)
Dept: PREADMISSION TESTING | Facility: HOSPITAL | Age: 88
End: 2023-12-05
Payer: MEDICARE

## 2023-12-05 ENCOUNTER — OFFICE VISIT (OUTPATIENT)
Dept: ORTHOPEDICS | Facility: CLINIC | Age: 88
End: 2023-12-05
Payer: MEDICARE

## 2023-12-05 DIAGNOSIS — M25.531 RIGHT WRIST PAIN: Primary | ICD-10-CM

## 2023-12-05 DIAGNOSIS — G56.01 ACUTE CARPAL TUNNEL SYNDROME OF RIGHT WRIST: ICD-10-CM

## 2023-12-05 DIAGNOSIS — M79.631 RIGHT FOREARM PAIN: ICD-10-CM

## 2023-12-05 DIAGNOSIS — S52.601A CLOSED FRACTURE OF DISTAL END OF RIGHT ULNA, UNSPECIFIED FRACTURE MORPHOLOGY, INITIAL ENCOUNTER: ICD-10-CM

## 2023-12-05 DIAGNOSIS — G56.01 CARPAL TUNNEL SYNDROME ON RIGHT: ICD-10-CM

## 2023-12-05 DIAGNOSIS — S52.501A CLOSED FRACTURE OF DISTAL END OF RIGHT RADIUS, UNSPECIFIED FRACTURE MORPHOLOGY, INITIAL ENCOUNTER: Primary | ICD-10-CM

## 2023-12-05 DIAGNOSIS — M25.531 RIGHT WRIST PAIN: ICD-10-CM

## 2023-12-05 PROCEDURE — 1159F PR MEDICATION LIST DOCUMENTED IN MEDICAL RECORD: ICD-10-PCS | Mod: CPTII,S$GLB,, | Performed by: PHYSICIAN ASSISTANT

## 2023-12-05 PROCEDURE — 29125 APPL SHORT ARM SPLINT STATIC: CPT | Mod: RT,S$GLB,, | Performed by: ORTHOPAEDIC SURGERY

## 2023-12-05 PROCEDURE — 99204 OFFICE O/P NEW MOD 45 MIN: CPT | Mod: 25,S$GLB,, | Performed by: PHYSICIAN ASSISTANT

## 2023-12-05 PROCEDURE — 73110 XR WRIST COMPLETE 3 VIEWS RIGHT: ICD-10-PCS | Mod: 26,RT,, | Performed by: RADIOLOGY

## 2023-12-05 PROCEDURE — 29125 PR APPLY FOREARM SPLINT,STATIC: ICD-10-PCS | Mod: RT,S$GLB,, | Performed by: ORTHOPAEDIC SURGERY

## 2023-12-05 PROCEDURE — 73090 X-RAY EXAM OF FOREARM: CPT | Mod: 26,RT,, | Performed by: RADIOLOGY

## 2023-12-05 PROCEDURE — 99204 PR OFFICE/OUTPT VISIT, NEW, LEVL IV, 45-59 MIN: ICD-10-PCS | Mod: 25,S$GLB,, | Performed by: PHYSICIAN ASSISTANT

## 2023-12-05 PROCEDURE — 99999 PR PBB SHADOW E&M-EST. PATIENT-LVL III: ICD-10-PCS | Mod: PBBFAC,,, | Performed by: PHYSICIAN ASSISTANT

## 2023-12-05 PROCEDURE — 1125F PR PAIN SEVERITY QUANTIFIED, PAIN PRESENT: ICD-10-PCS | Mod: CPTII,S$GLB,, | Performed by: PHYSICIAN ASSISTANT

## 2023-12-05 PROCEDURE — 1100F PR PT FALLS ASSESS DOC 2+ FALLS/FALL W/INJURY/YR: ICD-10-PCS | Mod: CPTII,S$GLB,, | Performed by: PHYSICIAN ASSISTANT

## 2023-12-05 PROCEDURE — 1159F MED LIST DOCD IN RCRD: CPT | Mod: CPTII,S$GLB,, | Performed by: PHYSICIAN ASSISTANT

## 2023-12-05 PROCEDURE — 1125F AMNT PAIN NOTED PAIN PRSNT: CPT | Mod: CPTII,S$GLB,, | Performed by: PHYSICIAN ASSISTANT

## 2023-12-05 PROCEDURE — 99999 PR PBB SHADOW E&M-EST. PATIENT-LVL III: CPT | Mod: PBBFAC,,, | Performed by: PHYSICIAN ASSISTANT

## 2023-12-05 PROCEDURE — 73110 X-RAY EXAM OF WRIST: CPT | Mod: 26,RT,, | Performed by: RADIOLOGY

## 2023-12-05 PROCEDURE — 73090 X-RAY EXAM OF FOREARM: CPT | Mod: TC,RT

## 2023-12-05 PROCEDURE — 73110 X-RAY EXAM OF WRIST: CPT | Mod: TC,RT

## 2023-12-05 PROCEDURE — 73090 XR FOREARM RIGHT: ICD-10-PCS | Mod: 26,RT,, | Performed by: RADIOLOGY

## 2023-12-05 PROCEDURE — 3288F PR FALLS RISK ASSESSMENT DOCUMENTED: ICD-10-PCS | Mod: CPTII,S$GLB,, | Performed by: PHYSICIAN ASSISTANT

## 2023-12-05 PROCEDURE — 1100F PTFALLS ASSESS-DOCD GE2>/YR: CPT | Mod: CPTII,S$GLB,, | Performed by: PHYSICIAN ASSISTANT

## 2023-12-05 PROCEDURE — 3288F FALL RISK ASSESSMENT DOCD: CPT | Mod: CPTII,S$GLB,, | Performed by: PHYSICIAN ASSISTANT

## 2023-12-05 RX ORDER — MUPIROCIN 20 MG/G
OINTMENT TOPICAL
Status: CANCELLED | OUTPATIENT
Start: 2023-12-05

## 2023-12-05 RX ORDER — TRAMADOL HYDROCHLORIDE 50 MG/1
50 TABLET ORAL EVERY 6 HOURS PRN
Qty: 20 TABLET | Refills: 0 | Status: SHIPPED | OUTPATIENT
Start: 2023-12-05

## 2023-12-05 RX ORDER — CEFAZOLIN SODIUM 2 G/50ML
2 SOLUTION INTRAVENOUS
Status: CANCELLED | OUTPATIENT
Start: 2023-12-05

## 2023-12-05 NOTE — H&P (VIEW-ONLY)
[facility]  History & Physical  Orthopedics    SUBJECTIVE:      Chief Complaint: right wrist injury    Referring Provider: Lana Damon Dr. is the supervising physician for this encounter/patient    History of Present Illness:  Patient is a 89 y.o. right hand dominant female who presents today with complaints of right wrist injury occurred on 11/26/23 when she fell. She was seen in the ED in [place] for distal radius fracture, this was not reduced, but she was placed in a sugartong splint. She has been taking Naproxen as needed for pain, does not tolerated oxycodone. She reports 6/10 pain, but feels well with the wrist immobilized. She does report tingling into the fingers which is new since the injury. No surgical history on the hands, however she does have Right TSA.     Onset of symptoms/DOI was 11/26/23.    Symptoms are aggravated by activity and movement.    Symptoms are alleviated by rest and immobilization.    Symptoms consist of pain, swelling, ecchymosis, deformity, decreased ROM, and numbness/tingling.    The patient rates their pain as a 6/10.    Attempted treatment(s) and/or interventions include activity modifications, rest, splinting/casting.     The patient denies any fevers, chills, N/V, D/C and presents for evaluation.       Past Medical History:   Diagnosis Date    Arthritis     Diverticular disease     Hyperlipidemia     Hypertension      Past Surgical History:   Procedure Laterality Date    A-V CARDIAC PACEMAKER INSERTION N/A 11/1/2021    Procedure: INSERTION, CARDIAC PACEMAKER, DUAL CHAMBER;  Surgeon: Brayden Coats MD;  Location: [facility];  Service: Cardiology;  Laterality: N/A;  SB, Dual PPM, SJM, MAC, DM, 3 Prep    EYE SURGERY      both eyes catarac    HYSTERECTOMY  1970    OVARY SURGERY      ROTATOR CUFF REPAIR Right     TOTAL KNEE REPLACEMENT USING COMPUTER NAVIGATION Right     TOTAL SHOULDER ARTHROPLASTY Right      Review of patient's allergies  indicates:  No Known Allergies  Social History     Social History Narrative    Not on file     Family History   Problem Relation Age of Onset    Heart disease Father          Current Outpatient Medications:     acetaminophen (TYLENOL) 500 MG tablet, Take 425 mg by mouth daily as needed., Disp: , Rfl:     aspirin 325 MG tablet, Take 325 mg by mouth once daily. 1/2 tablet daily, Disp: , Rfl:     calcium-vitamin D3 (OS-VIJAY 500 + D3) 500 mg(1,250mg) -200 unit per tablet, Take 1 tablet by mouth 2 (two) times daily with meals., Disp: , Rfl:     ginkgo biloba 120 mg Tab, Take 1 tablet by mouth once daily., Disp: , Rfl: 0    hydroCHLOROthiazide (HYDRODIURIL) 25 MG tablet, Take 1 tablet by mouth once daily, Disp: 90 tablet, Rfl: 3    lovastatin (MEVACOR) 20 MG tablet, TAKE 1 TABLET BY MOUTH ONCE DAILY IN THE EVENING, Disp: 90 tablet, Rfl: 3    multivitamin with minerals tablet, Take 1 tablet by mouth once daily., Disp: , Rfl:     naproxen (NAPROSYN) 500 MG tablet, Take 1 tablet (500 mg total) by mouth 2 (two) times daily with meals., Disp: 20 tablet, Rfl: 0    ondansetron (ZOFRAN-ODT) 4 MG TbDL, Take 1 tablet (4 mg total) by mouth every 8 (eight) hours as needed (nausea)., Disp: 12 tablet, Rfl: 0    oxyCODONE-acetaminophen (PERCOCET) 5-325 mg per tablet, Take 1 tablet by mouth every 6 (six) hours as needed for Pain., Disp: 12 tablet, Rfl: 0    temazepam (RESTORIL) 15 mg Cap, TAKE 1 CAPSULE (15 MG TOTAL) BY MOUTH EVERY EVENING., Disp: 30 capsule, Rfl: 5      Review of Systems:  Constitutional: no fever or chills  Eyes: no visual changes  ENT: no nasal congestion or sore throat  Respiratory: no cough or shortness of breath  Cardiovascular: no chest pain  Gastrointestinal: no nausea or vomiting, tolerating diet  Musculoskeletal: pain, soreness, numbness/tingling, and decreased ROM    OBJECTIVE:      Vital Signs (Most Recent):  There were no vitals filed for this visit.  There is no height or weight on file to calculate  BMI.      Physical Exam:  Constitutional: The patient appears well-developed and well-nourished. No distress.   Skin: No lesions appreciated  Head: Normocephalic and atraumatic.   Nose: Nose normal.   Ears: No deformities seen  Eyes: Conjunctivae and EOM are normal.   Neck: No tracheal deviation present.   Cardiovascular: Normal rate and intact distal pulses.    Pulmonary/Chest: Effort normal. No respiratory distress.   Abdominal: There is no guarding.   Neurological: The patient is alert.   Psychiatric: The patient has a normal mood and affect.     Right Hand/Wrist Examination:    Observation/Inspection:  Swelling  wrist    Deformity  wrist  Discoloration  Ecchymosis present     Scars   none    Atrophy  none    HAND/WRIST EXAMINATION:  TTP  along the distal radius/ulna    Neurovascular Exam:  Digits WWP, brisk CR < 3s throughout  NVI motor/LTS to M/R/U nerves, radial pulse 2+ however diminished sensation to light touch in the medial distribution    ROM hand/elbow/wrist not assessed today due to condition    Abdomen not guarded  Respirations nonlabored  Perfusion intact    Diagnostic Results:     Imaging - I independently viewed the patient's imaging as well as the radiology report.      FINDINGS:  Fracture of the distal radius and ulnar styloid with dorsal angulation is again seen.     Impression:     See above    EMG - none    ASSESSMENT/PLAN:      89 y.o. yo female with Right distal radius and distal ulnar fractures, acute carpal tunnel syndrome due to fracture    Plan: The patient and I had a thorough discussion today.  We discussed the working diagnosis as well as several other potential alternative diagnoses.  Treatment options were discussed, both conservative and surgical, including risks/benefits of both options. Surgery is highly recommended for this, which she and her daughter both agree with.    She is consented for Right ORIF distal radius and distal ulnar fractures, possible bridge place, open carpal  tunnel release with Dr. Burkett on 12/8/23. Preop clearance clinic is notified for clearance appointment due to pacemaker. She is placed in splint today.    The patient has not responded to adequate non operative treatment at this time and/or non operative treatment is not indicated. Thus, the risks, benefits and alternatives to surgery were discussed with the patient in detail.  Specific risks include but are not limited to bleeding, infection, vessel and/or nerve damage, pain, numbness, tingling, compartment syndrome, need for additional surgery, failure to return to pre-injury and/or preoperative functional status, inability to return to work, scar sensitivity, delayed healing, complex regional pain syndrome, weakness, pulley injury, tendon injury, bowstringing, partial and/or incomplete relief of symptoms, persistence of and/or worsening of symptoms, hardware and/or surgical failure, prominent and/or symptomatic hardware possibly necessitating future removal, osteomyelitis, amputation, loss of function, stiffness, rotational malalignment, functional debility, dysfunction, decreased  strength, need for prolonged postoperative rehabilitation, malunion, nonunion, deep venous thrombosis, pulmonary embolism, arthritis and death.  The patient states an understanding and wishes to proceed with surgery.   All questions were answered.  No guarantees were implied or stated.  Written informed consent was obtained.    Should the patient's symptoms worsen, persist, or fail to improve they should return for reevaluation and I would be happy to see them back anytime.           Please do not hesitate to reach out to us via email, phone, or MyChart with any questions, concerns, or feedback.

## 2023-12-05 NOTE — PROGRESS NOTES
RT plaster volar splint application ordered by PA0C Russo digeorge. Skin intact with no redness or bruising.

## 2023-12-05 NOTE — PROGRESS NOTES
[facility]  History & Physical  Orthopedics    SUBJECTIVE:      Chief Complaint: right wrist injury    Referring Provider: Lana Damon Dr. is the supervising physician for this encounter/patient    History of Present Illness:  Patient is a 89 y.o. right hand dominant female who presents today with complaints of right wrist injury occurred on 11/26/23 when she fell. She was seen in the ED in [place] for distal radius fracture, this was not reduced, but she was placed in a sugartong splint. She has been taking Naproxen as needed for pain, does not tolerated oxycodone. She reports 6/10 pain, but feels well with the wrist immobilized. She does report tingling into the fingers which is new since the injury. No surgical history on the hands, however she does have Right TSA.     Onset of symptoms/DOI was 11/26/23.    Symptoms are aggravated by activity and movement.    Symptoms are alleviated by rest and immobilization.    Symptoms consist of pain, swelling, ecchymosis, deformity, decreased ROM, and numbness/tingling.    The patient rates their pain as a 6/10.    Attempted treatment(s) and/or interventions include activity modifications, rest, splinting/casting.     The patient denies any fevers, chills, N/V, D/C and presents for evaluation.       Past Medical History:   Diagnosis Date    Arthritis     Diverticular disease     Hyperlipidemia     Hypertension      Past Surgical History:   Procedure Laterality Date    A-V CARDIAC PACEMAKER INSERTION N/A 11/1/2021    Procedure: INSERTION, CARDIAC PACEMAKER, DUAL CHAMBER;  Surgeon: Brayden Coats MD;  Location: [facility];  Service: Cardiology;  Laterality: N/A;  SB, Dual PPM, SJM, MAC, DM, 3 Prep    EYE SURGERY      both eyes catarac    HYSTERECTOMY  1970    OVARY SURGERY      ROTATOR CUFF REPAIR Right     TOTAL KNEE REPLACEMENT USING COMPUTER NAVIGATION Right     TOTAL SHOULDER ARTHROPLASTY Right      Review of patient's allergies  indicates:  No Known Allergies  Social History     Social History Narrative    Not on file     Family History   Problem Relation Age of Onset    Heart disease Father          Current Outpatient Medications:     acetaminophen (TYLENOL) 500 MG tablet, Take 425 mg by mouth daily as needed., Disp: , Rfl:     aspirin 325 MG tablet, Take 325 mg by mouth once daily. 1/2 tablet daily, Disp: , Rfl:     calcium-vitamin D3 (OS-VIJAY 500 + D3) 500 mg(1,250mg) -200 unit per tablet, Take 1 tablet by mouth 2 (two) times daily with meals., Disp: , Rfl:     ginkgo biloba 120 mg Tab, Take 1 tablet by mouth once daily., Disp: , Rfl: 0    hydroCHLOROthiazide (HYDRODIURIL) 25 MG tablet, Take 1 tablet by mouth once daily, Disp: 90 tablet, Rfl: 3    lovastatin (MEVACOR) 20 MG tablet, TAKE 1 TABLET BY MOUTH ONCE DAILY IN THE EVENING, Disp: 90 tablet, Rfl: 3    multivitamin with minerals tablet, Take 1 tablet by mouth once daily., Disp: , Rfl:     naproxen (NAPROSYN) 500 MG tablet, Take 1 tablet (500 mg total) by mouth 2 (two) times daily with meals., Disp: 20 tablet, Rfl: 0    ondansetron (ZOFRAN-ODT) 4 MG TbDL, Take 1 tablet (4 mg total) by mouth every 8 (eight) hours as needed (nausea)., Disp: 12 tablet, Rfl: 0    oxyCODONE-acetaminophen (PERCOCET) 5-325 mg per tablet, Take 1 tablet by mouth every 6 (six) hours as needed for Pain., Disp: 12 tablet, Rfl: 0    temazepam (RESTORIL) 15 mg Cap, TAKE 1 CAPSULE (15 MG TOTAL) BY MOUTH EVERY EVENING., Disp: 30 capsule, Rfl: 5      Review of Systems:  Constitutional: no fever or chills  Eyes: no visual changes  ENT: no nasal congestion or sore throat  Respiratory: no cough or shortness of breath  Cardiovascular: no chest pain  Gastrointestinal: no nausea or vomiting, tolerating diet  Musculoskeletal: pain, soreness, numbness/tingling, and decreased ROM    OBJECTIVE:      Vital Signs (Most Recent):  There were no vitals filed for this visit.  There is no height or weight on file to calculate  BMI.      Physical Exam:  Constitutional: The patient appears well-developed and well-nourished. No distress.   Skin: No lesions appreciated  Head: Normocephalic and atraumatic.   Nose: Nose normal.   Ears: No deformities seen  Eyes: Conjunctivae and EOM are normal.   Neck: No tracheal deviation present.   Cardiovascular: Normal rate and intact distal pulses.    Pulmonary/Chest: Effort normal. No respiratory distress.   Abdominal: There is no guarding.   Neurological: The patient is alert.   Psychiatric: The patient has a normal mood and affect.     Right Hand/Wrist Examination:    Observation/Inspection:  Swelling  wrist    Deformity  wrist  Discoloration  Ecchymosis present     Scars   none    Atrophy  none    HAND/WRIST EXAMINATION:  TTP  along the distal radius/ulna    Neurovascular Exam:  Digits WWP, brisk CR < 3s throughout  NVI motor/LTS to M/R/U nerves, radial pulse 2+ however diminished sensation to light touch in the medial distribution    ROM hand/elbow/wrist not assessed today due to condition    Abdomen not guarded  Respirations nonlabored  Perfusion intact    Diagnostic Results:     Imaging - I independently viewed the patient's imaging as well as the radiology report.      FINDINGS:  Fracture of the distal radius and ulnar styloid with dorsal angulation is again seen.     Impression:     See above    EMG - none    ASSESSMENT/PLAN:      89 y.o. yo female with Right distal radius and distal ulnar fractures, acute carpal tunnel syndrome due to fracture    Plan: The patient and I had a thorough discussion today.  We discussed the working diagnosis as well as several other potential alternative diagnoses.  Treatment options were discussed, both conservative and surgical, including risks/benefits of both options. Surgery is highly recommended for this, which she and her daughter both agree with.    She is consented for Right ORIF distal radius and distal ulnar fractures, possible bridge place, open carpal  tunnel release with Dr. Burkett on 12/8/23. Preop clearance clinic is notified for clearance appointment due to pacemaker. She is placed in splint today.    The patient has not responded to adequate non operative treatment at this time and/or non operative treatment is not indicated. Thus, the risks, benefits and alternatives to surgery were discussed with the patient in detail.  Specific risks include but are not limited to bleeding, infection, vessel and/or nerve damage, pain, numbness, tingling, compartment syndrome, need for additional surgery, failure to return to pre-injury and/or preoperative functional status, inability to return to work, scar sensitivity, delayed healing, complex regional pain syndrome, weakness, pulley injury, tendon injury, bowstringing, partial and/or incomplete relief of symptoms, persistence of and/or worsening of symptoms, hardware and/or surgical failure, prominent and/or symptomatic hardware possibly necessitating future removal, osteomyelitis, amputation, loss of function, stiffness, rotational malalignment, functional debility, dysfunction, decreased  strength, need for prolonged postoperative rehabilitation, malunion, nonunion, deep venous thrombosis, pulmonary embolism, arthritis and death.  The patient states an understanding and wishes to proceed with surgery.   All questions were answered.  No guarantees were implied or stated.  Written informed consent was obtained.    Should the patient's symptoms worsen, persist, or fail to improve they should return for reevaluation and I would be happy to see them back anytime.           Please do not hesitate to reach out to us via email, phone, or MyChart with any questions, concerns, or feedback.

## 2023-12-05 NOTE — TELEPHONE ENCOUNTER
Cardiac clearance:    Ryne Chung MD Shuler, Sarah, MARJ; P Juliet Michele Staff  Cc: Giacomo Wallis NP  Caller: Unspecified (Today,  1:25 PM)  Since the surgery is emergent. Ok to proceed with planned surgery. No further cardiac work up is warranted.    Sincerely,  Ryne Chung MD.  Interventional Cardiologist  [facility], [place]

## 2023-12-06 ENCOUNTER — LAB VISIT (OUTPATIENT)
Dept: LAB | Facility: HOSPITAL | Age: 88
End: 2023-12-06
Payer: MEDICARE

## 2023-12-06 ENCOUNTER — OFFICE VISIT (OUTPATIENT)
Dept: INTERNAL MEDICINE | Facility: CLINIC | Age: 88
End: 2023-12-06
Payer: MEDICARE

## 2023-12-06 ENCOUNTER — HOSPITAL ENCOUNTER (OUTPATIENT)
Dept: CARDIOLOGY | Facility: CLINIC | Age: 88
Discharge: HOME | End: 2023-12-06
Payer: MEDICARE

## 2023-12-06 VITALS
BODY MASS INDEX: 24.1 KG/M2 | SYSTOLIC BLOOD PRESSURE: 191 MMHG | WEIGHT: 141.13 LBS | HEART RATE: 59 BPM | TEMPERATURE: 98 F | HEIGHT: 64 IN | DIASTOLIC BLOOD PRESSURE: 85 MMHG | OXYGEN SATURATION: 96 %

## 2023-12-06 DIAGNOSIS — N18.31 STAGE 3A CHRONIC KIDNEY DISEASE: ICD-10-CM

## 2023-12-06 DIAGNOSIS — Z95.0 CARDIAC PACEMAKER IN SITU: ICD-10-CM

## 2023-12-06 DIAGNOSIS — I50.32 CHRONIC DIASTOLIC HEART FAILURE: ICD-10-CM

## 2023-12-06 DIAGNOSIS — I10 PRIMARY HYPERTENSION: ICD-10-CM

## 2023-12-06 DIAGNOSIS — E78.2 MIXED HYPERLIPIDEMIA: ICD-10-CM

## 2023-12-06 DIAGNOSIS — I51.89 DIASTOLIC DYSFUNCTION: ICD-10-CM

## 2023-12-06 DIAGNOSIS — D64.9 NORMOCYTIC ANEMIA: ICD-10-CM

## 2023-12-06 DIAGNOSIS — I27.29 OTHER SECONDARY PULMONARY HYPERTENSION: ICD-10-CM

## 2023-12-06 DIAGNOSIS — S52.501S CLOSED FRACTURE OF DISTAL END OF RIGHT RADIUS, UNSPECIFIED FRACTURE MORPHOLOGY, SEQUELA: ICD-10-CM

## 2023-12-06 DIAGNOSIS — Z01.818 PREOPERATIVE EXAMINATION: Primary | ICD-10-CM

## 2023-12-06 LAB
ALBUMIN SERPL BCP-MCNC: 3.6 G/DL (ref 3.5–5.2)
ALP SERPL-CCNC: 90 U/L (ref 55–135)
ALT SERPL W/O P-5'-P-CCNC: 12 U/L (ref 10–44)
ANION GAP SERPL CALC-SCNC: 11 MMOL/L (ref 8–16)
AST SERPL-CCNC: 18 U/L (ref 10–40)
BILIRUB SERPL-MCNC: 0.5 MG/DL (ref 0.1–1)
BUN SERPL-MCNC: 28 MG/DL (ref 10–30)
CALCIUM SERPL-MCNC: 10 MG/DL (ref 8.7–10.5)
CHLORIDE SERPL-SCNC: 105 MMOL/L (ref 95–110)
CO2 SERPL-SCNC: 26 MMOL/L (ref 23–29)
CREAT SERPL-MCNC: 0.8 MG/DL (ref 0.5–1.4)
ERYTHROCYTE [DISTWIDTH] IN BLOOD BY AUTOMATED COUNT: 13.3 % (ref 11.5–14.5)
EST. GFR  (NO RACE VARIABLE): >60 ML/MIN/1.73 M^2
GLUCOSE SERPL-MCNC: 72 MG/DL (ref 70–110)
HCT VFR BLD AUTO: 35.9 % (ref 37–48.5)
HGB BLD-MCNC: 11.4 G/DL (ref 12–16)
MCH RBC QN AUTO: 29.5 PG (ref 27–31)
MCHC RBC AUTO-ENTMCNC: 31.8 G/DL (ref 32–36)
MCV RBC AUTO: 93 FL (ref 82–98)
PLATELET # BLD AUTO: 302 K/UL (ref 150–450)
PMV BLD AUTO: 9.9 FL (ref 9.2–12.9)
POTASSIUM SERPL-SCNC: 4 MMOL/L (ref 3.5–5.1)
PROT SERPL-MCNC: 6.6 G/DL (ref 6–8.4)
RBC # BLD AUTO: 3.87 M/UL (ref 4–5.4)
SODIUM SERPL-SCNC: 142 MMOL/L (ref 136–145)
WBC # BLD AUTO: 8.37 K/UL (ref 3.9–12.7)

## 2023-12-06 PROCEDURE — 93005 EKG 12-LEAD: ICD-10-PCS | Mod: S$GLB,,, | Performed by: NURSE PRACTITIONER

## 2023-12-06 PROCEDURE — 1125F AMNT PAIN NOTED PAIN PRSNT: CPT | Mod: CPTII,S$GLB,, | Performed by: NURSE PRACTITIONER

## 2023-12-06 PROCEDURE — 99999 PR PBB SHADOW E&M-EST. PATIENT-LVL IV: ICD-10-PCS | Mod: PBBFAC,,, | Performed by: NURSE PRACTITIONER

## 2023-12-06 PROCEDURE — 36415 COLL VENOUS BLD VENIPUNCTURE: CPT | Performed by: NURSE PRACTITIONER

## 2023-12-06 PROCEDURE — 99215 OFFICE O/P EST HI 40 MIN: CPT | Mod: S$GLB,,, | Performed by: NURSE PRACTITIONER

## 2023-12-06 PROCEDURE — 93005 ELECTROCARDIOGRAM TRACING: CPT | Mod: S$GLB,,, | Performed by: NURSE PRACTITIONER

## 2023-12-06 PROCEDURE — 85027 COMPLETE CBC AUTOMATED: CPT | Performed by: NURSE PRACTITIONER

## 2023-12-06 PROCEDURE — 80053 COMPREHEN METABOLIC PANEL: CPT | Performed by: NURSE PRACTITIONER

## 2023-12-06 PROCEDURE — 93010 ELECTROCARDIOGRAM REPORT: CPT | Mod: S$GLB,,, | Performed by: INTERNAL MEDICINE

## 2023-12-06 PROCEDURE — 1125F PR PAIN SEVERITY QUANTIFIED, PAIN PRESENT: ICD-10-PCS | Mod: CPTII,S$GLB,, | Performed by: NURSE PRACTITIONER

## 2023-12-06 PROCEDURE — 1159F MED LIST DOCD IN RCRD: CPT | Mod: CPTII,S$GLB,, | Performed by: NURSE PRACTITIONER

## 2023-12-06 PROCEDURE — 1159F PR MEDICATION LIST DOCUMENTED IN MEDICAL RECORD: ICD-10-PCS | Mod: CPTII,S$GLB,, | Performed by: NURSE PRACTITIONER

## 2023-12-06 PROCEDURE — 99999 PR PBB SHADOW E&M-EST. PATIENT-LVL IV: CPT | Mod: PBBFAC,,, | Performed by: NURSE PRACTITIONER

## 2023-12-06 PROCEDURE — 99215 PR OFFICE/OUTPT VISIT, EST, LEVL V, 40-54 MIN: ICD-10-PCS | Mod: S$GLB,,, | Performed by: NURSE PRACTITIONER

## 2023-12-06 PROCEDURE — 93010 EKG 12-LEAD: ICD-10-PCS | Mod: S$GLB,,, | Performed by: INTERNAL MEDICINE

## 2023-12-06 NOTE — ASSESSMENT & PLAN NOTE
I recommend to monitor fluid volume status during the perioperative period for signs of fluid overload due to patient's diastolic dysfunction. I recommend avoiding continuous IV fluids; if IV fluids are needed- please order for a specific time frame and consider lowering IV fluid rate.

## 2023-12-06 NOTE — ASSESSMENT & PLAN NOTE
Treated with   ASA/HCTZ  Followed per cardiology and optimized per Cardiology (Yousef).     Denies SOB/paroxysmal nocturnal dyspnea, orthopnea, edema, palpitations, dizziness, syncope.     Recent imaging ([facility]) 6/17/21      The left ventricle is normal in size with normal systolic function.  The estimated ejection fraction is 55%.  Grade I left ventricular diastolic dysfunction.  Normal right ventricular size with normal right ventricular systolic function.  The estimated PA systolic pressure is 39 mmHg.  Normal central venous pressure (3 mmHg).

## 2023-12-06 NOTE — ASSESSMENT & PLAN NOTE
Placed 11/1/21 for sick sinus syndrome- [employer] dual PPM  Followed per cardiology; last checked 8/31/23

## 2023-12-06 NOTE — DISCHARGE INSTRUCTIONS
Your surgery has been scheduled for:_______12/8/23___________________________________    You should report to:  ____[facility], located on the [place] side of the first floor of the           [facility] ([phone])  ____[facility], located on the [facility] side of the            Second floor of the [facility] ([phone])  ____3rd Floor [facility] located on the [facility] side of the [facility] [phone]  __X__[facility]: located at [address] [place] [address]. [address]     Please Note   Tell your doctor if you take Aspirin, products containing Aspirin, herbal medications  or blood thinners, such as Coumadin, Ticlid, or Plavix.  (Consult your provider regarding holding or stopping before surgery).  Arrange for someone to drive you home following surgery.  You will not be allowed to leave the surgical facility alone or drive yourself home following sedation and anesthesia.    Before Surgery  Stop taking all herbal medications, vitamins, and supplements 7 days prior to surgery  No Motrin/Advil (Ibuprofen) 7 days before surgery  No Aleve (Naproxen) 7 days before surgery  Stop Taking Asprin, products containing Aspirin __7___days before surgery   No Goody's/BC Powder 7 days before surgery  Refrain from drinking alcoholic beverages for 24 hours before and after surgery  Stop or limit smoking at least 24 hours prior to surgery  You may take Tylenol for pain    Night before Surgery  Do not eat or drink after midnight  Take a shower or bath (shower is recommended).  Bathe with Hibiclens soap or an antibacterial soap from the neck down.  If not supplied by your surgeon, hibiclens soap will need to be purchased over the counter in pharmacy.  Rinse soap off thoroughly.  Shampoo your hair with your regular shampoo    The Day of Surgery  Take another bath or shower with hibiclens  or any antibacterial soap, to reduce the chance of infection.  Take heart and blood pressure medications with a small sip of water, as advised by the perioperative team.  Do not take fluid pills  You may brush your teeth and rinse your mouth, but do not swallow any additional water.   Do not apply perfumes, powder, body lotions or deodorant on the day of surgery.  Nail polish should be removed.  Do not wear makeup or moisturizer  Wear comfortable clothes, such as a button front shirt and loose fitting pants.  Leave all jewelry, including body piercings, and valuables at home.    Bring any devices you will neeed after surgery such as crutches or canes.  If you have sleep apnea, please bring your CPAP machine  In the event that your physical condition changes including the onset of a cold or respiratory illness, or if you have to delay or cancel your surgery, please notify your surgeon.

## 2023-12-06 NOTE — ASSESSMENT & PLAN NOTE
No changes in urine volume.   Labs pending    Most recent GFR:    BUN=    Cr =   Encouraged to avoid NSAIDs, reduce salt intake, maintain adequate hydration, and exercise.    Not followed  per Nephrology    Tylenol as needed for pain    I  suggest monitoring renal function, input and output status casa-operatively. I  suggest avoiding nephrotoxic medication including NSAIDs, COX2 inhibitors, intravenous contrast agent,avoiding hypotension to prevent further renal impairment.

## 2023-12-06 NOTE — HPI
This is a 89 y.o. female  who presents today with son for a preoperative evaluation in preparation for open reduction internal fixation of right distal radius.  Surgery is indicated for closed fracture of right distal radius .   Patient is new to me.  The history has been obtained by speaking with the patient and reviewing the electronic medical record and/or outside health information. Significant health conditions for the perioperative period are discussed below in assessment and plan.   Patient reports current health status to be Good.  Denies any new symptoms before surgery.

## 2023-12-06 NOTE — PROGRESS NOTES
[facility]  Progress Note    Patient Name: Lu White  MRN: [id number]  Date of Evaluation- 12/07/2023  PCP- Vijay Carrizales MD    Future cases for Lu White [[id number]]       Case ID Status Date Time Elvis Procedure Provider Location    [id number] [facility] 12/8/2023 11:35  ORIF, FRACTURE, RADIUS, DISTAL - RIGHT vs bride plate Avery Burkett MD [[id number]] [facility] OR            HPI:  This is a 89 y.o. female  who presents today with son for a preoperative evaluation in preparation for open reduction internal fixation of right distal radius.  Surgery is indicated for closed fracture of right distal radius .   Patient is new to me.  The history has been obtained by speaking with the patient and reviewing the electronic medical record and/or outside health information. Significant health conditions for the perioperative period are discussed below in assessment and plan.   Patient reports current health status to be Good.  Denies any new symptoms before surgery.       Subjective/ Objective:     Chief Complaint: Preoperative evaulation, perioperative medical management, and complication reduction plan.     Functional Capacity: mall walking without CP/SOB.      Anesthesia issues: None    Difficulty mouth opening: No    Steroid use in the last 12 months:  No    Dental Issues: None    Family anesthesia difficulty: None       Family Hx of Thrombosis: None    Past Medical History:   Diagnosis Date    Arthritis     Disorder of kidney and ureter     Diverticular disease     Hyperlipidemia     Hypertension          Past Medical History Pertinent Negatives:   Diagnosis Date Noted    Anticoagulant long-term use 11/01/2021    Anxiety 12/06/2023    Asthma 11/01/2021    Cancer 11/01/2021    COPD (chronic obstructive pulmonary disease) 11/01/2021    Coronary artery disease 11/01/2021    Deep vein thrombosis 12/06/2023    Diabetes mellitus 11/01/2021    Diabetes mellitus, type 2 12/06/2023    Encounter for blood  "transfusion 11/01/2021    GERD (gastroesophageal reflux disease) 12/06/2023    Liver disease 11/01/2021    Seizures 11/01/2021    Sleep apnea 11/01/2021    Stroke 11/01/2021    Thyroid disease 11/01/2021         Past Surgical History:   Procedure Laterality Date    A-V CARDIAC PACEMAKER INSERTION N/A 11/01/2021    Procedure: INSERTION, CARDIAC PACEMAKER, DUAL CHAMBER;  Surgeon: Brayden Coats MD;  Location: [facility] LAB;  Service: Cardiology;  Laterality: N/A;  SB, Dual PPM, SJM, MAC, DM, 3 Prep    EYE SURGERY      both eyes catarac    HYSTERECTOMY  1970    JOINT REPLACEMENT      bilateral knee    OVARY SURGERY      ROTATOR CUFF REPAIR Right     TOTAL KNEE REPLACEMENT USING COMPUTER NAVIGATION Right     TOTAL SHOULDER ARTHROPLASTY Right        Review of Systems   Constitutional:  Negative for chills, fatigue, fever and unexpected weight change.   HENT:  Negative for congestion, hearing loss, rhinorrhea, sore throat, tinnitus and trouble swallowing.    Eyes:  Negative for visual disturbance.   Respiratory:  Negative for cough, chest tightness, shortness of breath and wheezing.    Cardiovascular:  Negative for chest pain, palpitations and leg swelling.   Gastrointestinal:  Negative for constipation and diarrhea.        Denies Fatty liver, Hepatitis   Genitourinary:  Negative for decreased urine volume, difficulty urinating, dysuria, frequency, hematuria and urgency.   Musculoskeletal:  Positive for arthralgias (RUE). Negative for back pain, neck pain and neck stiffness.   Skin:  Negative for rash and wound.   Neurological:  Negative for dizziness, syncope, weakness, numbness and headaches.   Hematological:  Bruises/bleeds easily.   Psychiatric/Behavioral:  Negative for sleep disturbance and suicidal ideas.               VITALS  Visit Vitals  BP (!) 191/85 (BP Location: Left arm, Patient Position: Sitting)   Pulse (!) 59   Temp 98.2 °F (36.8 °C) (Oral)   Ht 5' 4" (1.626 m)   Wt 64 kg (141 lb 1.5 oz)   LMP  (LMP " Unknown)   SpO2 96%   BMI 24.22 kg/m²          Physical Exam  Vitals reviewed.   Constitutional:       General: She is not in acute distress.     Appearance: She is well-developed.   HENT:      Head: Normocephalic.      Nose: Nose normal.      Mouth/Throat:      Pharynx: No oropharyngeal exudate.   Eyes:      General:         Right eye: No discharge.         Left eye: No discharge.      Conjunctiva/sclera: Conjunctivae normal.      Pupils: Pupils are equal, round, and reactive to light.   Neck:      Thyroid: No thyromegaly.      Vascular: No carotid bruit or JVD.      Trachea: No tracheal deviation.   Cardiovascular:      Rate and Rhythm: Regular rhythm. Bradycardia present.      Pulses:           Carotid pulses are 2+ on the right side and 2+ on the left side.       Dorsalis pedis pulses are 2+ on the right side and 2+ on the left side.        Posterior tibial pulses are 2+ on the right side and 2+ on the left side.      Heart sounds: Normal heart sounds. No murmur heard.  Pulmonary:      Effort: Pulmonary effort is normal. No respiratory distress.      Breath sounds: Normal breath sounds. No stridor. No wheezing, rhonchi or rales.   Abdominal:      General: Bowel sounds are normal. There is no distension.      Palpations: Abdomen is soft.      Tenderness: There is no abdominal tenderness. There is no guarding.   Musculoskeletal:      Right hand: Swelling present. Decreased range of motion. Decreased strength.      Left hand: Normal.      Cervical back: Normal range of motion. No pain with movement.      Right lower leg: No edema.      Left lower leg: No edema.   Lymphadenopathy:      Cervical: No cervical adenopathy.   Skin:     General: Skin is warm and dry.      Capillary Refill: Capillary refill takes less than 2 seconds.      Findings: No erythema or rash.      Comments: senile purpura- BUE/BLE   Neurological:      Mental Status: She is alert and oriented to person, place, and time.   Psychiatric:          Behavior: Behavior normal.          Significant Labs:  Lab Results   Component Value Date    WBC 8.37 12/06/2023    HGB 11.4 (L) 12/06/2023    HCT 35.9 (L) 12/06/2023     12/06/2023    CHOL 192 03/23/2023    TRIG 55 03/23/2023    HDL 70 03/23/2023    ALT 12 12/06/2023    AST 18 12/06/2023     12/06/2023    K 4.0 12/06/2023     12/06/2023    CREATININE 0.8 12/06/2023    BUN 28 12/06/2023    CO2 26 12/06/2023    TSH 2.099 09/23/2021    INR 1.0 10/29/2021       Diagnostic Studies: No relevant studies.    EKG:   Results for orders placed or performed during the hospital encounter of 12/06/23   EKG 12-lead    Collection Time: 12/06/23  9:24 AM    Narrative    Test Reason : I50.32,    Vent. Rate : 066 BPM     Atrial Rate : 066 BPM     P-R Int : 138 ms          QRS Dur : 080 ms      QT Int : 388 ms       P-R-T Axes : 037 021 070 degrees     QTc Int : 406 ms    Electronic atrial pacemaker  When compared with ECG of 27-APR-2023 08:07,  No significant change was found  Confirmed by Kristal Tapia MD ([id number]) on 12/6/2023 2:01:22 PM    Referred By: SHIRA PRESTON           Confirmed By:Kristal Tapia MD       2D ECHO:  TTE:  Results for orders placed or performed during the hospital encounter of 06/17/21   Echo   Result Value Ref Range    Ascending aorta 2.28 cm    STJ 2.34 cm    AV mean gradient 6 mmHg    Ao peak suki 1.67 m/s    Ao VTI 35.85 cm    IVRT 98.95 msec    IVS 0.84 0.6 - 1.1 cm    LA size 3.28 cm    Left Atrium Major Axis 4.90 cm    Left Atrium Minor Axis 5.60 cm    LVIDd 4.70 (A) 3.5 - 6.0 cm    LVIDs 2.00 2.1 - 4.0 cm    LVOT diameter 1.98 cm    LVOT peak VTI 31.73 cm    Posterior Wall 0.91 0.6 - 1.1 cm    MV Peak A Suki 0.77 m/s    E wave deceleration time 289.74 msec    MV Peak E Suki 0.67 m/s    PV Peak D Suki 0.35 m/s    PV Peak S Suki 0.66 m/s    RA Major Axis 4.60 cm    RA Width 2.90 cm    TR Max Suki 2.98 m/s    TDI LATERAL 0.06 m/s    TDI SEPTAL 0.05 m/s    LA WIDTH 2.50 cm    Ao root  "annulus 3.09 cm    MV stenosis pressure 1/2 time 84.03 ms    LV Diastolic Volume 45.27 mL    LV Systolic Volume 23.37 mL    LVOT peak suki 1.45 m/s    Mr max suki 0.05 m/s    LA volume (mod) 56.03 cm3    MV "A" wave duration 15.22 msec    MV peak gradient 3 mmHg    RV S' 8.63 cm/s    LV LATERAL E/E' RATIO 11.17 m/s    LV SEPTAL E/E' RATIO 13.40 m/s    FS 57 %    LA volume 36.43 cm3    LV mass 137.47 g    Left Ventricle Relative Wall Thickness 0.39 cm    AV valve area 2.72 cm2    AV Velocity Ratio 0.87     AV index (prosthetic) 0.89     MV valve area p 1/2 method 2.62 cm2    E/A ratio 0.87     Mean e' 0.06 m/s    Pulm vein S/D ratio 1.89     LVOT area 3.1 cm2    LVOT stroke volume 97.65 cm3    AV peak gradient 11 mmHg    E/E' ratio 12.18 m/s    Triscuspid Valve Regurgitation Peak Gradient 36 mmHg    BSA 1.7 m2    LV Systolic Volume Index 13.8 mL/m2    LV Diastolic Volume Index 26.79 mL/m2    LA Volume Index 21.6 mL/m2    LV Mass Index 81 g/m2    LA Volume Index (Mod) 33.2 mL/m2    Right Atrial Pressure (from IVC) 3 mmHg    QEF 55 %    EF 55 %    RVDD 2.30 cm    TAPSE 1.40 cm    Right ventricular length in diastole (apical 4-chamber view) 5.20 cm    TV resting pulmonary artery pressure 39 mmHg    Narrative    · The left ventricle is normal in size with normal systolic function.  · The estimated ejection fraction is 55%.  · Grade I left ventricular diastolic dysfunction.  · Normal right ventricular size with normal right ventricular systolic   function.  · The estimated PA systolic pressure is 39 mmHg.  · Normal central venous pressure (3 mmHg).        Old PET Stress 2017  CONCLUSIONS: NO CLINICALLY SIGNIFICANT STRESS INDUCED PERFUSION DEFECTS as assessed with PET perfusion imaging.   1. There is no evidence of a discrete hemodynamically significant coronary stenosis.   2. Although no clinically significant stress defect is seen, there is a proximal to distal longitudinal perfusion gradient. These results suggest mild " endothelial dysfunction due to mild, diffuse, non-obstructive coronary atherosclerosis without clinically significant, localized perfusion defects.   3. Resting wall motion is physiologic. Stress wall motion is physiologic.   4. LV function is normal at rest and stress.  (normal is >= 51%)   5. The ventricular volumes are normal at rest and stress.   6. The extracardiac distribution of radioactivity is normal.   7. There was no previous study available to compare.           This document has been electronically    SIGNED BY: Mik Nash MD On: 09/05/2017 15:21           Active Cardiac Conditions: None      Revised Cardiac Risk Index   High -Risk Surgery  Intraperitoneal; Intrathoracic; suprainguinal vascular Yes- + 1 No- 0   History of Ischemic Heart Disease   (Hx of MI/positive exercise test/current chest pain due to ischemia/use of nitrate therapy/EKG with pathological Q waves) Yes- + 1 No- 0   History of CHF  (Pulmonary edema/bilateral rales or S3 gallop/PND/CXR showing pulmonary vascular redistribution) Yes- + 1 No- 0   History of CVA   (Prior stroke or TIA) Yes- + 1 No- 0   Pre-operative treatment with insulin Yes- + 1 No- 0   Pre-operative creatinine > 2mg/dl Yes- + 1 No- 0   Total: 0      Risk Status:  Estimated risk of cardiac complications after non-cardiac surgery using the Revised Cardiac Risk Index for Preoperative risk is 3.9 %      ARISCAT (Canet) risk index: Intermediate: 13.3% risk of post-op pulmonary complications.    American Society of Anesthesiologists Physical Status classification (ASA): 3           No further cardiac workup needed prior to surgery.          Orders Placed This Encounter    Comprehensive Metabolic Panel    CBC Without Differential    EKG 12-lead           Assessment/Plan:     Closed fracture of right distal radius  Scheduled with Dr. Burkett on 12/8/23 for ORIF of right distal radius.    HTN (hypertension)  Current BP  uncontrolled today; attributes reading to pain. Manual BP  178/80    Taking: HCTZ  Clinic/hospital readings since 2022: Systolic range: 112-230 (ER visit) and Diastolic range: 63-91.     Lifestyle changes to reduce systolic BP:  exercise 30 minutes per day,  5 days per week or 150 minutes weekly; sodium reduction and avoidance of high salt foods such as processed meats, frozen meals and  fast foods.   Keeping a healthy weight/BMI can help with better BP control    I recommend monitoring BP during perioperative period as uncontrolled pain can elevate blood pressure.         HLD (hyperlipidemia)  Recommend  healthy diet (DASH/Mediterranean) and exercise.   Patient should exercise 30 minutes at least five times weekly once recovered from surgery. .  Treated with: lovastatin    Diastolic dysfunction  I recommend to monitor fluid volume status during the perioperative period for signs of fluid overload due to patient's diastolic dysfunction. I recommend avoiding continuous IV fluids; if IV fluids are needed- please order for a specific time frame and consider lowering IV fluid rate.    Chronic diastolic heart failure  Treated with   ASA/HCTZ  Followed per cardiology and optimized per Cardiology (Yousef).     Denies SOB/paroxysmal nocturnal dyspnea, orthopnea, edema, palpitations, dizziness, syncope.     Recent imaging (Echo) 6/17/21      The left ventricle is normal in size with normal systolic function.  The estimated ejection fraction is 55%.  Grade I left ventricular diastolic dysfunction.  Normal right ventricular size with normal right ventricular systolic function.  The estimated PA systolic pressure is 39 mmHg.  Normal central venous pressure (3 mmHg).      Other secondary pulmonary hypertension  PASP= 39 mmHg on TTE 6/17/21    I recommend to avoid intravascular volume overload or reduce pre load    Cardiac pacemaker in situ  Placed 11/1/21 for sick sinus syndrome- Abbott dual PPM  Followed per cardiology; last checked 8/31/23    Stage 3 chronic kidney disease  No changes  in urine volume.   Labs pending    Most recent GFR:    BUN=    Cr =   Encouraged to avoid NSAIDs, reduce salt intake, maintain adequate hydration, and exercise.    Not followed  per Nephrology    Tylenol as needed for pain    I  suggest monitoring renal function, input and output status casa-operatively. I  suggest avoiding nephrotoxic medication including NSAIDs, COX2 inhibitors, intravenous contrast agent,avoiding hypotension to prevent further renal impairment.      Normocytic anemia  Current labs:  Hgb-  11.4    Hct- 35.9     Recommend monitoring during perioperative period.       Discussion/Management of Perioperative Care    Thromboembolic prophylaxis (VTE) Care: Risk factors for thrombosis include: age and surgical procedure.  I recommend prophylaxis of thromboembolism with the use of compression stockings/pneumatic devices, and/or pharmacologic agents. The benefits should outweigh the risks for pharmacologic prophylaxis in the perioperative period. I also encourage early ambulation if not contraindicated during the post-operative period.    Risk factors for post-operative pulmonary complications include:age > 65 years, congestive heart failure, and HTN. To reduce the risk of pulmonary complications, prophylactic recommendations include: incentive spirometry use/deep breathing, end tidal carbon dioxide monitoring, and pain control.      Risk factors for renal complications include: Pre-existing renal disease, age, HTN, and CHF. To reduce the risk of postoperative renal complications, I recommend the patient maintain adequate fluid volume status by drinking 2 liters of water daily.  Avoid/reduce NSAIDS and HALL-2 inhibitors use as well as IV contrast for renal protection.    I recommend the use of appropriate prophylactic antibiotics to reduce the risk of surgical site infections.    Delirium risk factors include advanced age. I recommend to avoid/reduce use of benzodiazepine use (not for patients who take on  a regular basis), anticholinergics, Benadryl,  and agents that may cause postoperative serotonin syndrome.  Controlled pain can decrease the risk for postop delirium and since opioids are used for postoperative pain control, I suggest using the lowest dose for the shortest amount of time necessary for pain management.     The patient is at an increased risk for urinary retention due to : advanced age. I recommend to avoid/decrease the use of benzodiazepines, anticholinergics, and Benadryl in the perioperative period. I also recommend using opioids for the shortest period of time if possible.          This visit was focused on Preoperative evaluation, Perioperative Medical management, complication reduction plans. I suggest that the patient follows up with primary care or relevant sub specialists for ongoing health care.    I appreciate the opportunity to be involved in this patients care. Please feel free to contact me if there were any questions about this consultation.        I spent a total of 42 minutes on the day of the visit.This includes face to face time and non-face to face time preparing to see the patient (e.g., review of tests), obtaining and/or reviewing separately obtained history, documenting clinical information in the electronic or other health record, independently interpreting results and communicating results to the patient/family/caregiver, or care coordinator.           BP elevated today but patient needs emergent surgery so there is no time to follow-up with BP readings.    Patient is ready for surgery.  Optimized per Cardiology (Yousef)              Giacomo Wallis NP  Perioperative Medicine  [facility]

## 2023-12-06 NOTE — ASSESSMENT & PLAN NOTE
Current BP  uncontrolled today; attributes reading to pain. Manual /80    Taking: HCTZ  Clinic/hospital readings since 2022: Systolic range: 112-230 (ER visit) and Diastolic range: 63-91.     Lifestyle changes to reduce systolic BP:  exercise 30 minutes per day,  5 days per week or 150 minutes weekly; sodium reduction and avoidance of high salt foods such as processed meats, frozen meals and  fast foods.   Keeping a healthy weight/BMI can help with better BP control    I recommend monitoring BP during perioperative period as uncontrolled pain can elevate blood pressure.

## 2023-12-06 NOTE — ASSESSMENT & PLAN NOTE
Recommend  healthy diet (DASH/Mediterranean) and exercise.   Patient should exercise 30 minutes at least five times weekly once recovered from surgery. .  Treated with: lovastatin

## 2023-12-06 NOTE — ASSESSMENT & PLAN NOTE
PASP= 39 mmHg on TTE 6/17/21    I recommend to avoid intravascular volume overload or reduce pre load

## 2023-12-06 NOTE — OUTPATIENT SUBJECTIVE & OBJECTIVE
Outpatient Subjective & Objective      Chief Complaint: Preoperative evaulation, perioperative medical management, and complication reduction plan.     Functional Capacity: mall walking without CP/SOB.      Anesthesia issues: None    Difficulty mouth opening: No    Steroid use in the last 12 months:  No    Dental Issues: None    Family anesthesia difficulty: None       Family Hx of Thrombosis: None    Past Medical History:   Diagnosis Date    Arthritis     Disorder of kidney and ureter     Diverticular disease     Hyperlipidemia     Hypertension          Past Medical History Pertinent Negatives:   Diagnosis Date Noted    Anticoagulant long-term use 11/01/2021    Anxiety 12/06/2023    Asthma 11/01/2021    Cancer 11/01/2021    COPD (chronic obstructive pulmonary disease) 11/01/2021    Coronary artery disease 11/01/2021    Deep vein thrombosis 12/06/2023    Diabetes mellitus 11/01/2021    Diabetes mellitus, type 2 12/06/2023    Encounter for blood transfusion 11/01/2021    GERD (gastroesophageal reflux disease) 12/06/2023    Liver disease 11/01/2021    Seizures 11/01/2021    Sleep apnea 11/01/2021    Stroke 11/01/2021    Thyroid disease 11/01/2021         Past Surgical History:   Procedure Laterality Date    A-V CARDIAC PACEMAKER INSERTION N/A 11/01/2021    Procedure: INSERTION, CARDIAC PACEMAKER, DUAL CHAMBER;  Surgeon: Brayden Coats MD;  Location: [facility] EP LAB;  Service: Cardiology;  Laterality: N/A;  SB, Dual PPM, SJM, MAC, DM, 3 Prep    EYE SURGERY      both eyes catarac    HYSTERECTOMY  1970    JOINT REPLACEMENT      bilateral knee    OVARY SURGERY      ROTATOR CUFF REPAIR Right     TOTAL KNEE REPLACEMENT USING COMPUTER NAVIGATION Right     TOTAL SHOULDER ARTHROPLASTY Right        Review of Systems   Constitutional:  Negative for chills, fatigue, fever and unexpected weight change.   HENT:  Negative for congestion, hearing loss, rhinorrhea, sore throat, tinnitus and trouble swallowing.    Eyes:  Negative for  "visual disturbance.   Respiratory:  Negative for cough, chest tightness, shortness of breath and wheezing.    Cardiovascular:  Negative for chest pain, palpitations and leg swelling.   Gastrointestinal:  Negative for constipation and diarrhea.        Denies Fatty liver, Hepatitis   Genitourinary:  Negative for decreased urine volume, difficulty urinating, dysuria, frequency, hematuria and urgency.   Musculoskeletal:  Positive for arthralgias (RUE). Negative for back pain, neck pain and neck stiffness.   Skin:  Negative for rash and wound.   Neurological:  Negative for dizziness, syncope, weakness, numbness and headaches.   Hematological:  Bruises/bleeds easily.   Psychiatric/Behavioral:  Negative for sleep disturbance and suicidal ideas.               VITALS  Visit Vitals  BP (!) 191/85 (BP Location: Left arm, Patient Position: Sitting)   Pulse (!) 59   Temp 98.2 °F (36.8 °C) (Oral)   Ht 5' 4" (1.626 m)   Wt 64 kg (141 lb 1.5 oz)   LMP  (LMP Unknown)   SpO2 96%   BMI 24.22 kg/m²          Physical Exam  Vitals reviewed.   Constitutional:       General: She is not in acute distress.     Appearance: She is well-developed.   HENT:      Head: Normocephalic.      Nose: Nose normal.      Mouth/Throat:      Pharynx: No oropharyngeal exudate.   Eyes:      General:         Right eye: No discharge.         Left eye: No discharge.      Conjunctiva/sclera: Conjunctivae normal.      Pupils: Pupils are equal, round, and reactive to light.   Neck:      Thyroid: No thyromegaly.      Vascular: No carotid bruit or JVD.      Trachea: No tracheal deviation.   Cardiovascular:      Rate and Rhythm: Regular rhythm. Bradycardia present.      Pulses:           Carotid pulses are 2+ on the right side and 2+ on the left side.       Dorsalis pedis pulses are 2+ on the right side and 2+ on the left side.        Posterior tibial pulses are 2+ on the right side and 2+ on the left side.      Heart sounds: Normal heart sounds. No murmur " heard.  Pulmonary:      Effort: Pulmonary effort is normal. No respiratory distress.      Breath sounds: Normal breath sounds. No stridor. No wheezing, rhonchi or rales.   Abdominal:      General: Bowel sounds are normal. There is no distension.      Palpations: Abdomen is soft.      Tenderness: There is no abdominal tenderness. There is no guarding.   Musculoskeletal:      Right hand: Swelling present. Decreased range of motion. Decreased strength.      Left hand: Normal.      Cervical back: Normal range of motion. No pain with movement.      Right lower leg: No edema.      Left lower leg: No edema.   Lymphadenopathy:      Cervical: No cervical adenopathy.   Skin:     General: Skin is warm and dry.      Capillary Refill: Capillary refill takes less than 2 seconds.      Findings: No erythema or rash.      Comments: senile purpura- BUE/BLE   Neurological:      Mental Status: She is alert and oriented to person, place, and time.   Psychiatric:         Behavior: Behavior normal.          Significant Labs:  Lab Results   Component Value Date    WBC 8.37 12/06/2023    HGB 11.4 (L) 12/06/2023    HCT 35.9 (L) 12/06/2023     12/06/2023    CHOL 192 03/23/2023    TRIG 55 03/23/2023    HDL 70 03/23/2023    ALT 12 12/06/2023    AST 18 12/06/2023     12/06/2023    K 4.0 12/06/2023     12/06/2023    CREATININE 0.8 12/06/2023    BUN 28 12/06/2023    CO2 26 12/06/2023    TSH 2.099 09/23/2021    INR 1.0 10/29/2021       Diagnostic Studies: No relevant studies.    EKG:   Results for orders placed or performed during the hospital encounter of 12/06/23   EKG 12-lead    Collection Time: 12/06/23  9:24 AM    Narrative    Test Reason : I50.32,    Vent. Rate : 066 BPM     Atrial Rate : 066 BPM     P-R Int : 138 ms          QRS Dur : 080 ms      QT Int : 388 ms       P-R-T Axes : 037 021 070 degrees     QTc Int : 406 ms    Electronic atrial pacemaker  When compared with ECG of 27-APR-2023 08:07,  No significant change was  "found  Confirmed by Willie ANGULO, Kristal ([id number]) on 12/6/2023 2:01:22 PM    Referred By: SHIRA PRESTON           Confirmed By:Kristal Tapia MD       2D ECHO:  TTE:  Results for orders placed or performed during the hospital encounter of 06/17/21   Echo   Result Value Ref Range    Ascending aorta 2.28 cm    STJ 2.34 cm    AV mean gradient 6 mmHg    Ao peak noman 1.67 m/s    Ao VTI 35.85 cm    IVRT 98.95 msec    IVS 0.84 0.6 - 1.1 cm    LA size 3.28 cm    Left Atrium Major Axis 4.90 cm    Left Atrium Minor Axis 5.60 cm    LVIDd 4.70 (A) 3.5 - 6.0 cm    LVIDs 2.00 2.1 - 4.0 cm    LVOT diameter 1.98 cm    LVOT peak VTI 31.73 cm    Posterior Wall 0.91 0.6 - 1.1 cm    MV Peak A Noman 0.77 m/s    E wave deceleration time 289.74 msec    MV Peak E Noman 0.67 m/s    PV Peak D Noman 0.35 m/s    PV Peak S Noman 0.66 m/s    RA Major Axis 4.60 cm    RA Width 2.90 cm    TR Max Noman 2.98 m/s    TDI LATERAL 0.06 m/s    TDI SEPTAL 0.05 m/s    LA WIDTH 2.50 cm    Ao root annulus 3.09 cm    MV stenosis pressure 1/2 time 84.03 ms    LV Diastolic Volume 45.27 mL    LV Systolic Volume 23.37 mL    LVOT peak noman 1.45 m/s    Mr max noman 0.05 m/s    LA volume (mod) 56.03 cm3    MV "A" wave duration 15.22 msec    MV peak gradient 3 mmHg    RV S' 8.63 cm/s    LV LATERAL E/E' RATIO 11.17 m/s    LV SEPTAL E/E' RATIO 13.40 m/s    FS 57 %    LA volume 36.43 cm3    LV mass 137.47 g    Left Ventricle Relative Wall Thickness 0.39 cm    AV valve area 2.72 cm2    AV Velocity Ratio 0.87     AV index (prosthetic) 0.89     MV valve area p 1/2 method 2.62 cm2    E/A ratio 0.87     Mean e' 0.06 m/s    Pulm vein S/D ratio 1.89     LVOT area 3.1 cm2    LVOT stroke volume 97.65 cm3    AV peak gradient 11 mmHg    E/E' ratio 12.18 m/s    Triscuspid Valve Regurgitation Peak Gradient 36 mmHg    BSA 1.7 m2    LV Systolic Volume Index 13.8 mL/m2    LV Diastolic Volume Index 26.79 mL/m2    LA Volume Index 21.6 mL/m2    LV Mass Index 81 g/m2    LA Volume Index (Mod) 33.2 mL/m2 "    Right Atrial Pressure (from IVC) 3 mmHg    QEF 55 %    EF 55 %    RVDD 2.30 cm    TAPSE 1.40 cm    Right ventricular length in diastole (apical 4-chamber view) 5.20 cm    TV resting pulmonary artery pressure 39 mmHg    Narrative    · The left ventricle is normal in size with normal systolic function.  · The estimated ejection fraction is 55%.  · Grade I left ventricular diastolic dysfunction.  · Normal right ventricular size with normal right ventricular systolic   function.  · The estimated PA systolic pressure is 39 mmHg.  · Normal central venous pressure (3 mmHg).        Old PET Stress 2017  CONCLUSIONS: NO CLINICALLY SIGNIFICANT STRESS INDUCED PERFUSION DEFECTS as assessed with PET perfusion imaging.   1. There is no evidence of a discrete hemodynamically significant coronary stenosis.   2. Although no clinically significant stress defect is seen, there is a proximal to distal longitudinal perfusion gradient. These results suggest mild endothelial dysfunction due to mild, diffuse, non-obstructive coronary atherosclerosis without clinically significant, localized perfusion defects.   3. Resting wall motion is physiologic. Stress wall motion is physiologic.   4. LV function is normal at rest and stress.  (normal is >= 51%)   5. The ventricular volumes are normal at rest and stress.   6. The extracardiac distribution of radioactivity is normal.   7. There was no previous study available to compare.           This document has been electronically    SIGNED BY: Mik Nash MD On: 09/05/2017 15:21           Active Cardiac Conditions: None      Revised Cardiac Risk Index   High -Risk Surgery  Intraperitoneal; Intrathoracic; suprainguinal vascular Yes- + 1 No- 0   History of Ischemic Heart Disease   (Hx of MI/positive exercise test/current chest pain due to ischemia/use of nitrate therapy/EKG with pathological Q waves) Yes- + 1 No- 0   History of CHF  (Pulmonary edema/bilateral rales or S3 gallop/PND/CXR showing  pulmonary vascular redistribution) Yes- + 1 No- 0   History of CVA   (Prior stroke or TIA) Yes- + 1 No- 0   Pre-operative treatment with insulin Yes- + 1 No- 0   Pre-operative creatinine > 2mg/dl Yes- + 1 No- 0   Total: 0      Risk Status:  Estimated risk of cardiac complications after non-cardiac surgery using the Revised Cardiac Risk Index for Preoperative risk is 3.9 %      ARISCAT (Canet) risk index: Intermediate: 13.3% risk of post-op pulmonary complications.    American Society of Anesthesiologists Physical Status classification (ASA): 3           No further cardiac workup needed prior to surgery.    Outpatient Subjective & Objective

## 2023-12-07 ENCOUNTER — ANESTHESIA EVENT (OUTPATIENT)
Dept: SURGERY | Facility: HOSPITAL | Age: 88
End: 2023-12-07
Payer: MEDICARE

## 2023-12-07 ENCOUNTER — TELEPHONE (OUTPATIENT)
Dept: ORTHOPEDICS | Facility: CLINIC | Age: 88
End: 2023-12-07
Payer: MEDICARE

## 2023-12-07 PROBLEM — D64.9 NORMOCYTIC ANEMIA: Status: ACTIVE | Noted: 2023-12-07

## 2023-12-07 RX ORDER — ACETAMINOPHEN 500 MG
1000 TABLET ORAL 3 TIMES DAILY
Qty: 20 TABLET | Refills: 0 | Status: SHIPPED | OUTPATIENT
Start: 2023-12-07

## 2023-12-07 RX ORDER — IBUPROFEN 600 MG/1
600 TABLET ORAL 3 TIMES DAILY
Qty: 20 TABLET | Refills: 0 | Status: SHIPPED | OUTPATIENT
Start: 2023-12-07 | End: 2024-02-26

## 2023-12-07 RX ORDER — OXYCODONE AND ACETAMINOPHEN 5; 325 MG/1; MG/1
1 TABLET ORAL EVERY 4 HOURS PRN
Qty: 10 TABLET | Refills: 0 | Status: SHIPPED | OUTPATIENT
Start: 2023-12-07 | End: 2024-02-26

## 2023-12-07 NOTE — TELEPHONE ENCOUNTER
Spoke with the patient. Notified of 10 AM arrival time to the [facility].  Informed of current visitor policy.  Reminded of NPO and need for transportation. Patient verbalized understanding to all.    Elizabeth Love MS, OTC  Clinical Assistant to Dr. Ross Dunbar [facility]

## 2023-12-08 ENCOUNTER — ANESTHESIA (OUTPATIENT)
Dept: SURGERY | Facility: HOSPITAL | Age: 88
End: 2023-12-08
Payer: MEDICARE

## 2023-12-08 ENCOUNTER — HOSPITAL ENCOUNTER (OUTPATIENT)
Facility: HOSPITAL | Age: 88
Discharge: HOME | End: 2023-12-08
Attending: ORTHOPAEDIC SURGERY | Admitting: ORTHOPAEDIC SURGERY
Payer: MEDICARE

## 2023-12-08 VITALS
HEART RATE: 60 BPM | DIASTOLIC BLOOD PRESSURE: 61 MMHG | SYSTOLIC BLOOD PRESSURE: 138 MMHG | BODY MASS INDEX: 23.9 KG/M2 | TEMPERATURE: 98 F | OXYGEN SATURATION: 96 % | RESPIRATION RATE: 16 BRPM | WEIGHT: 140 LBS | HEIGHT: 64 IN

## 2023-12-08 DIAGNOSIS — S52.501A CLOSED FRACTURE OF DISTAL END OF RIGHT RADIUS, UNSPECIFIED FRACTURE MORPHOLOGY, INITIAL ENCOUNTER: ICD-10-CM

## 2023-12-08 DIAGNOSIS — G56.01 CARPAL TUNNEL SYNDROME ON RIGHT: ICD-10-CM

## 2023-12-08 DIAGNOSIS — S52.601A CLOSED FRACTURE OF DISTAL END OF RIGHT ULNA, UNSPECIFIED FRACTURE MORPHOLOGY, INITIAL ENCOUNTER: ICD-10-CM

## 2023-12-08 PROCEDURE — 63600175 PHARM REV CODE 636 W HCPCS: Performed by: NURSE ANESTHETIST, CERTIFIED REGISTERED

## 2023-12-08 PROCEDURE — 71000033 HC RECOVERY, INTIAL HOUR: Performed by: ORTHOPAEDIC SURGERY

## 2023-12-08 PROCEDURE — 64721 PR REVISE MEDIAN N/CARPAL TUNNEL SURG: ICD-10-PCS | Mod: 51,RT,, | Performed by: ORTHOPAEDIC SURGERY

## 2023-12-08 PROCEDURE — 25000003 PHARM REV CODE 250: Performed by: NURSE ANESTHETIST, CERTIFIED REGISTERED

## 2023-12-08 PROCEDURE — 63600175 PHARM REV CODE 636 W HCPCS: Performed by: PHYSICIAN ASSISTANT

## 2023-12-08 PROCEDURE — 64415 NJX AA&/STRD BRCH PLXS IMG: CPT | Performed by: STUDENT IN AN ORGANIZED HEALTH CARE EDUCATION/TRAINING PROGRAM

## 2023-12-08 PROCEDURE — 64415 SUPRACLAVICULAR SINGLE SHOT: ICD-10-PCS | Mod: 59,RT,, | Performed by: STUDENT IN AN ORGANIZED HEALTH CARE EDUCATION/TRAINING PROGRAM

## 2023-12-08 PROCEDURE — 25609 PR OPEN RX DISTAL RADIUS FX, INTRA-ARTICULAR, 3+ FRAG: ICD-10-PCS | Mod: RT,,, | Performed by: ORTHOPAEDIC SURGERY

## 2023-12-08 PROCEDURE — 27201423 OPTIME MED/SURG SUP & DEVICES STERILE SUPPLY: Performed by: ORTHOPAEDIC SURGERY

## 2023-12-08 PROCEDURE — 36000710: Performed by: ORTHOPAEDIC SURGERY

## 2023-12-08 PROCEDURE — 63600175 PHARM REV CODE 636 W HCPCS: Performed by: STUDENT IN AN ORGANIZED HEALTH CARE EDUCATION/TRAINING PROGRAM

## 2023-12-08 PROCEDURE — 37000009 HC ANESTHESIA EA ADD 15 MINS: Performed by: ORTHOPAEDIC SURGERY

## 2023-12-08 PROCEDURE — 25609 OPTX DST RD XART FX/EP SEP3+: CPT | Mod: RT,,, | Performed by: ORTHOPAEDIC SURGERY

## 2023-12-08 PROCEDURE — D9220A PRA ANESTHESIA: ICD-10-PCS | Mod: CRNA,,, | Performed by: NURSE ANESTHETIST, CERTIFIED REGISTERED

## 2023-12-08 PROCEDURE — 99900035 HC TECH TIME PER 15 MIN (STAT)

## 2023-12-08 PROCEDURE — 25000003 PHARM REV CODE 250: Performed by: PHYSICIAN ASSISTANT

## 2023-12-08 PROCEDURE — 71000015 HC POSTOP RECOV 1ST HR: Performed by: ORTHOPAEDIC SURGERY

## 2023-12-08 PROCEDURE — 94761 N-INVAS EAR/PLS OXIMETRY MLT: CPT

## 2023-12-08 PROCEDURE — C1713 ANCHOR/SCREW BN/BN,TIS/BN: HCPCS | Performed by: ORTHOPAEDIC SURGERY

## 2023-12-08 PROCEDURE — 36000711: Performed by: ORTHOPAEDIC SURGERY

## 2023-12-08 PROCEDURE — D9220A PRA ANESTHESIA: Mod: CRNA,,, | Performed by: NURSE ANESTHETIST, CERTIFIED REGISTERED

## 2023-12-08 PROCEDURE — C1769 GUIDE WIRE: HCPCS | Performed by: ORTHOPAEDIC SURGERY

## 2023-12-08 PROCEDURE — D9220A PRA ANESTHESIA: Mod: ANES,,, | Performed by: STUDENT IN AN ORGANIZED HEALTH CARE EDUCATION/TRAINING PROGRAM

## 2023-12-08 PROCEDURE — 37000008 HC ANESTHESIA 1ST 15 MINUTES: Performed by: ORTHOPAEDIC SURGERY

## 2023-12-08 PROCEDURE — 64721 CARPAL TUNNEL SURGERY: CPT | Mod: 51,RT,, | Performed by: ORTHOPAEDIC SURGERY

## 2023-12-08 PROCEDURE — 27200750 HC INSULATED NEEDLE/ STIMUPLEX: Performed by: STUDENT IN AN ORGANIZED HEALTH CARE EDUCATION/TRAINING PROGRAM

## 2023-12-08 PROCEDURE — D9220A PRA ANESTHESIA: ICD-10-PCS | Mod: ANES,,, | Performed by: STUDENT IN AN ORGANIZED HEALTH CARE EDUCATION/TRAINING PROGRAM

## 2023-12-08 DEVICE — PEG BONE CORTICAL  2.3X16 TI: Type: IMPLANTABLE DEVICE | Site: WRIST | Status: FUNCTIONAL

## 2023-12-08 DEVICE — PEG BONE CORTICAL 2.3X18 TI: Type: IMPLANTABLE DEVICE | Site: WRIST | Status: FUNCTIONAL

## 2023-12-08 DEVICE — SCREW BNE LOK HEXLB 3.5X12: Type: IMPLANTABLE DEVICE | Site: WRIST | Status: FUNCTIONAL

## 2023-12-08 DEVICE — PLATE VDR NARROW RIGHT: Type: IMPLANTABLE DEVICE | Site: WRIST | Status: FUNCTIONAL

## 2023-12-08 DEVICE — SCREW BNE N LOK HEXLB 3.5X12: Type: IMPLANTABLE DEVICE | Site: WRIST | Status: FUNCTIONAL

## 2023-12-08 RX ORDER — ONDANSETRON 4 MG/1
8 TABLET, ORALLY DISINTEGRATING ORAL EVERY 8 HOURS PRN
Status: DISCONTINUED | OUTPATIENT
Start: 2023-12-08 | End: 2023-12-08 | Stop reason: HOSPADM

## 2023-12-08 RX ORDER — ROPIVACAINE HYDROCHLORIDE 5 MG/ML
INJECTION, SOLUTION EPIDURAL; INFILTRATION; PERINEURAL
Status: COMPLETED | OUTPATIENT
Start: 2023-12-08 | End: 2023-12-08

## 2023-12-08 RX ORDER — MIDAZOLAM HYDROCHLORIDE 1 MG/ML
1 INJECTION INTRAMUSCULAR; INTRAVENOUS
Status: DISPENSED | OUTPATIENT
Start: 2023-12-08

## 2023-12-08 RX ORDER — MUPIROCIN 20 MG/G
OINTMENT TOPICAL 2 TIMES DAILY
Status: DISCONTINUED | OUTPATIENT
Start: 2023-12-08 | End: 2023-12-08 | Stop reason: HOSPADM

## 2023-12-08 RX ORDER — ACETAMINOPHEN 500 MG
1000 TABLET ORAL
Status: COMPLETED | OUTPATIENT
Start: 2023-12-08 | End: 2023-12-08

## 2023-12-08 RX ORDER — FENTANYL CITRATE 50 UG/ML
100 INJECTION, SOLUTION INTRAMUSCULAR; INTRAVENOUS
Status: DISPENSED | OUTPATIENT
Start: 2023-12-08

## 2023-12-08 RX ORDER — PROPOFOL 10 MG/ML
VIAL (ML) INTRAVENOUS CONTINUOUS PRN
Status: DISCONTINUED | OUTPATIENT
Start: 2023-12-08 | End: 2023-12-08

## 2023-12-08 RX ORDER — SODIUM CHLORIDE 0.9 % (FLUSH) 0.9 %
10 SYRINGE (ML) INJECTION
Status: DISCONTINUED | OUTPATIENT
Start: 2023-12-08 | End: 2023-12-08 | Stop reason: HOSPADM

## 2023-12-08 RX ORDER — FAMOTIDINE 10 MG/ML
INJECTION INTRAVENOUS
Status: DISCONTINUED | OUTPATIENT
Start: 2023-12-08 | End: 2023-12-08

## 2023-12-08 RX ORDER — HALOPERIDOL 5 MG/ML
0.5 INJECTION INTRAMUSCULAR EVERY 10 MIN PRN
Status: DISCONTINUED | OUTPATIENT
Start: 2023-12-08 | End: 2023-12-08 | Stop reason: HOSPADM

## 2023-12-08 RX ORDER — HYDROCODONE BITARTRATE AND ACETAMINOPHEN 5; 325 MG/1; MG/1
1 TABLET ORAL EVERY 4 HOURS PRN
Status: DISCONTINUED | OUTPATIENT
Start: 2023-12-08 | End: 2023-12-08 | Stop reason: HOSPADM

## 2023-12-08 RX ORDER — CELECOXIB 200 MG/1
400 CAPSULE ORAL
Status: COMPLETED | OUTPATIENT
Start: 2023-12-08 | End: 2023-12-08

## 2023-12-08 RX ORDER — MUPIROCIN 20 MG/G
OINTMENT TOPICAL
Status: DISCONTINUED | OUTPATIENT
Start: 2023-12-08 | End: 2023-12-08 | Stop reason: HOSPADM

## 2023-12-08 RX ORDER — LIDOCAINE HYDROCHLORIDE 20 MG/ML
INJECTION INTRAVENOUS
Status: DISCONTINUED | OUTPATIENT
Start: 2023-12-08 | End: 2023-12-08

## 2023-12-08 RX ORDER — ONDANSETRON 2 MG/ML
INJECTION INTRAMUSCULAR; INTRAVENOUS
Status: DISCONTINUED | OUTPATIENT
Start: 2023-12-08 | End: 2023-12-08

## 2023-12-08 RX ORDER — DEXAMETHASONE SODIUM PHOSPHATE 4 MG/ML
INJECTION, SOLUTION INTRA-ARTICULAR; INTRALESIONAL; INTRAMUSCULAR; INTRAVENOUS; SOFT TISSUE
Status: DISCONTINUED | OUTPATIENT
Start: 2023-12-08 | End: 2023-12-08

## 2023-12-08 RX ORDER — ONDANSETRON 2 MG/ML
4 INJECTION INTRAMUSCULAR; INTRAVENOUS DAILY PRN
Status: DISCONTINUED | OUTPATIENT
Start: 2023-12-08 | End: 2023-12-08 | Stop reason: HOSPADM

## 2023-12-08 RX ADMIN — PROPOFOL 50 MCG/KG/MIN: 10 INJECTION, EMULSION INTRAVENOUS at 11:12

## 2023-12-08 RX ADMIN — DEXAMETHASONE SODIUM PHOSPHATE 8 MG: 4 INJECTION, SOLUTION INTRAMUSCULAR; INTRAVENOUS at 11:12

## 2023-12-08 RX ADMIN — LIDOCAINE HYDROCHLORIDE 75 MG: 20 INJECTION INTRAVENOUS at 11:12

## 2023-12-08 RX ADMIN — ROPIVACAINE HYDROCHLORIDE 30 ML: 5 INJECTION EPIDURAL; INFILTRATION; PERINEURAL at 11:12

## 2023-12-08 RX ADMIN — FAMOTIDINE 20 MG: 10 INJECTION, SOLUTION INTRAVENOUS at 11:12

## 2023-12-08 RX ADMIN — CELECOXIB 400 MG: 200 CAPSULE ORAL at 10:12

## 2023-12-08 RX ADMIN — MUPIROCIN: 20 OINTMENT TOPICAL at 10:12

## 2023-12-08 RX ADMIN — SODIUM CHLORIDE: 9 INJECTION, SOLUTION INTRAVENOUS at 10:12

## 2023-12-08 RX ADMIN — ACETAMINOPHEN 1000 MG: 500 TABLET ORAL at 10:12

## 2023-12-08 RX ADMIN — ONDANSETRON 4 MG: 2 INJECTION INTRAMUSCULAR; INTRAVENOUS at 11:12

## 2023-12-08 RX ADMIN — FENTANYL CITRATE 50 MCG: 50 INJECTION INTRAMUSCULAR; INTRAVENOUS at 10:12

## 2023-12-08 RX ADMIN — CEFAZOLIN 2 G: 2 INJECTION, POWDER, FOR SOLUTION INTRAMUSCULAR; INTRAVENOUS at 11:12

## 2023-12-08 NOTE — INTERVAL H&P NOTE
The patient has been examined and the H&P has been reviewed:    I concur with the findings and no changes have occurred since H&P was written.    Surgery risks, benefits and alternative options discussed and understood by patient/family.    Patient seen and examined.  She does have subjective reports of decreased sensation in the thumb and on exam she has tingling and decreased sensation in the thumb and index finger.  Will proceed with ORIF and carpal tunnel release today.      There are no hospital problems to display for this patient.

## 2023-12-08 NOTE — PLAN OF CARE
Discharge plan reviewed w/ pt and family at bedside. AVSS on RA. R wrist dressing CDI, sling in place. No c/o pain. All Rx delivered to bedside. Pt states she is ready for discharge.

## 2023-12-08 NOTE — BRIEF OP NOTE
[place] - Surgery ([facility])  Brief Operative Note    Surgery Date: 12/8/2023     Surgeon(s) and Role:     * Avery Burkett MD - Primary    Assisting Surgeon: None    Pre-op Diagnosis:  Closed fracture of distal end of right radius, unspecified fracture morphology, initial encounter [S52.501A]  Closed fracture of distal end of right ulna, unspecified fracture morphology, initial encounter [S52.601A]  Carpal tunnel syndrome on right [G56.01]    Post-op Diagnosis:  Post-Op Diagnosis Codes:     * Closed fracture of distal end of right radius, unspecified fracture morphology, initial encounter [S52.501A]     * Closed fracture of distal end of right ulna, unspecified fracture morphology, initial encounter [S52.601A]     * Carpal tunnel syndrome on right [G56.01]    Procedure(s) (LRB):  ORIF, FRACTURE, RADIUS, DISTAL - RIGHT vs bride plate (Right)  RELEASE, CARPAL TUNNEL - RIGHT (Right)  ORIF, FRACTURE, ULNA - RIGHT (Right)    Anesthesia: Regional    Operative Findings: See Op notee    Estimated Blood Loss: * No values recorded between 12/8/2023 12:15 PM and 12/8/2023  1:49 PM *         Specimens:   Specimen (24h ago, onward)      None              Discharge Note    OUTCOME: Patient tolerated treatment/procedure well without complication and is now ready for discharge.    DISPOSITION: Home or Self Care    FINAL DIAGNOSIS:  <principal problem not specified>    FOLLOWUP: In clinic    DISCHARGE INSTRUCTIONS:    Discharge Procedure Orders   Diet general     Call MD for:  temperature >100.4     Call MD for:  persistent nausea and vomiting     Call MD for:  severe uncontrolled pain     Call MD for:  difficulty breathing, headache or visual disturbances     Call MD for:  redness, tenderness, or signs of infection (pain, swelling, redness, odor or green/yellow discharge around incision site)     Call MD for:  hives     Call MD for:  persistent dizziness or light-headedness     Call MD for:  extreme fatigue     Lifting  restrictions     Leave dressing on - Keep it clean, dry, and intact until clinic visit

## 2023-12-08 NOTE — ANESTHESIA PROCEDURE NOTES
Supraclavicular single shot    Patient location during procedure: pre-op   Block not for primary anesthetic.  Reason for block: at surgeon's request and post-op pain management   Post-op Pain Location: right lower arm   Start time: 12/8/2023 11:01 AM  Timeout: 12/8/2023 11:00 AM   End time: 12/8/2023 11:03 AM    Staffing  Authorizing Provider: Jewell Washington MD  Performing Provider: Jewell Washington MD    Staffing  Performed by: Jewell Washington MD  Authorized by: Jewell Washington MD    Preanesthetic Checklist  Completed: patient identified, IV checked, site marked, risks and benefits discussed, surgical consent, monitors and equipment checked, pre-op evaluation and timeout performed  Peripheral Block  Patient position: supine  Prep: ChloraPrep  Patient monitoring: heart rate, cardiac monitor, continuous pulse ox, continuous capnometry and frequent blood pressure checks  Block type: supraclavicular  Laterality: right  Injection technique: single shot  Needle  Needle type: Stimuplex   Needle gauge: 22 G  Needle length: 2 in  Needle localization: anatomical landmarks and ultrasound guidance   -ultrasound image captured on disc.  Assessment  Injection assessment: negative aspiration, negative parasthesia and local visualized surrounding nerve  Paresthesia pain: none  Heart rate change: no  Slow fractionated injection: yes  Pain Tolerance: comfortable throughout block and no complaints  Medications:    Medications: ropivacaine (NAROPIN) injection 0.5% - Perineural   30 mL - 12/8/2023 11:02:00 AM    Additional Notes  VSS.  [facility] RN monitoring vitals throughout procedure.  Patient tolerated procedure well.

## 2023-12-08 NOTE — TRANSFER OF CARE
"Anesthesia Transfer of Care Note    Patient: Lu White    Procedure(s) Performed: Procedure(s) (LRB):  ORIF, FRACTURE, RADIUS, DISTAL - RIGHT vs bride plate (Right)  RELEASE, CARPAL TUNNEL - RIGHT (Right)  ORIF, FRACTURE, ULNA - RIGHT (Right)    Patient location: [facility]    Anesthesia Type: general and regional    Transport from OR: Transported from OR on room air with adequate spontaneous ventilation. Transported from OR on 6-10 L/min O2 by face mask with adequate spontaneous ventilation    Post pain: adequate analgesia    Post assessment: no apparent anesthetic complications and tolerated procedure well    Post vital signs: stable    Level of consciousness: awake    Nausea/Vomiting: no nausea/vomiting    Complications: none    Transfer of care protocol was followed      Last vitals: Visit Vitals  BP (!) 123/58 (BP Location: Left arm, Patient Position: Lying)   Pulse 60   Temp 36.6 °C (97.9 °F) (Oral)   Resp 18   Ht 5' 4" (1.626 m)   Wt 63.5 kg (140 lb)   LMP  (LMP Unknown)   SpO2 100%   Breastfeeding No   BMI 24.03 kg/m²     "

## 2023-12-08 NOTE — OP NOTE
DATE OF PROCEDURE:   12/08/2023     SURGEON:  Avery Burkett M.D.      FIRST ASSISTANT:   Tracey ANGULO resident     PREOPERATIVE DIAGNOSIS:  1) Right intra-articular distal radius fracture, 3+ parts.  2) Right carpal tunnel syndrome     POSTOPERATIVE DIAGNOSIS:   1) Right intra-articular distal radius fracture, 3+ parts.   2) Right carpal tunnel syndrome     PROCEDURES PERFORMED:    1) Open reduction and internal fixation of Right intra-articular distal radius fracture, 3+ parts  2) Right carpal tunnel release     IMPLANTS:   Acumed volar locking plate with a combination of cortical and locking screws     PACKS AND DRAINS:  None.     COMPLICATIONS:  None.     IV FLUIDS:  Crystalloid.     ESTIMATED BLOOD LOSS:  5 mL     TOURNIQUET TIME:   53 minutes.     INDICATION FOR PROCEDURE:   the patient is a 89 y.o. year old female who previously sustained significant trauma to her right upper extremity.  The patient was evaluated in clinic and found to have a displaced right distal radius fracture as well as carpal tunnel syndrome.   Thus, the risks, benefits and alternatives to surgery were discussed with the patient in detail.  Specific risks discussed include but are not limited to bleeding, infection, vessel and/or nerve damage, pain, numbness, tingling, compartment syndrome, need for additional surgery, failure to return to pre-injury and/or preoperative functional status, inability to return to work, scar sensitivity, delayed healing, complex regional pain syndrome, weakness, pulley injury, tendon injury, bowstringing, partial and/or incomplete relief of symptoms, persistence of and/or worsening of symptoms, hardware and/or surgical failure, prominent and/or symptomatic hardware possibly necessitating future removal, osteomyelitis, amputation, loss of function, stiffness, rotational malalignment, functional debility, dysfunction, decreased  strength, need for prolonged postoperative rehabilitation, malunion, nonunion,  deep venous thrombosis, pulmonary embolism, arthritis and death.  The patient states an understanding and wishes to proceed with surgery.   All questions were answered.  No guarantees were implied or stated.  Written informed consent was obtained.     PROCEDURE IN DETAIL:  On the date of the procedure, the patient was identified   in the preoperative holding area.  With their participation, the right upper extremity was marked as the operative site.   she then underwent regional anesthesia and was transferred to the Operative Suite.   She was then transferred to the OR table and the right upper extremity was placed on a hand table.  All superficial bony prominences were well padded and SCD devices were in place.  The right upper extremity was then prepped and draped in the usual sterile fashion after a nonsterile tourniquet had been applied high on the upper arm.  Timeout was then taken and confirmed by all present to confirm the correct patient, site, procedure, and administration of preoperative antibiotics.  All were in agreement, so we proceeded.  Incision was marked out in the standard FCR approach to the right distal radius.  The right upper extremity was then exsanguinated with an Esmarch and tourniquet was then insufflated to 250 mmHg where it remained for the duration of the procedure.    I turned my attention toward the right carpal tunnel release.The incision was marked out using De La O's cardinal line and the radial border of the ring finger. The incision was made in line with radial aspect of the 4th finger over the carpal tunnel for a distance of approximately 1.5cm. Careful dissection was made down to the palmar fascia. The ulnar fat pad was taken ulnarly and the palmar fascia was taken radially.  A mosquito hemostat was used to spread the superficial tissues off the transverse carpal ligament.  Retractors were placed proximally and distally to expose the ligament along its length.  The transverse  carpal ligament was thus identified and exposed.  A scalpel was utilized to incise the ligament mid-substance.  A mosquito hemostat and freer elevator were then utilized to free the deep surface of the ligament from underlying structures.  Ligament division was then completed with tenotomy scissors both proximally and distally.  Care was taken distally to ensure not to cut past Kaplans Cardinal Line or injure the superficial palmar arch. The distal fat was visualized at the distalmost aspect of the ligament division.  Mosquito hemostat was passed proximally and distally to confirm that it was a complete release. The area was irrigated with copious amounts of normal saline and 4-0 nylon suture was used to close the skin with horizontal mattress sutures.  Having completed the carpal tunnel release that then turned my attention towards open reduction internal fixation of the patient's right distal radius fracture. Skin incision was made sharply with a scalpel and dissection carried down to the flexor carpi radialis tendon.  The tendon was retracted ulnarly and then the fascia deep to this was incised sharply with a scalpel.  All flexor tendons including the flexor carpi radialis and flexor pollicis longus were then swept ulnarly and the radial   artery was swept radially.  The pronator quadratus was then identified and   sharply divided distally at the red-white junction and also at the radial aspect   on the radius.  A key elevator was then utilized to sweep the pronator   quadratus muscle off of the volar surface of the radius.  The fracture site was   then identified and recreated with an osteotome.   fracture callus and fracture hematoma was then removed with a combination of curette and Mobile elevators.  Reduction maneuver was then   performed and alignment was restored to the radius.   an Accu Med volar plate was then chosen and placed onto the distal radius in the appropriate   site.  K-wires were placed into  the plate to provisionally fix it to the   bone and then a C-arm fluoroscopy was brought into the field to verify placement   of her hardware.  Once we were happy with our hardware placement and the reduction, we then fixed the plate   to the bone with distal locking screws and pegs and proximal shaft screws in succession. Fluoroscopy was again utilized to verify placement of the hardware and to   maintain our reduction.   length alignment and rotation had been restored and then the wrist was taken through range of motion and there   was no crepitus and all hardware was extraarticular.  The distal radioulnar joint was then stressed and shucked and found to be stable.  Final fluoroscopic x-rays were taken.  The wound was then copiously irrigated with sterile saline and tourniquet was let down.  there was no significant bleeding and Hemostasis was achieved with bipolar electrocautery.  The wound was then closed in a layered fashion with 4-0 Vicryl suture in the subcutaneous tissues followed by 4-0 nylon in a horizontal mattress fashion to close the skin.  Sterile dressings were then applied consisting of Xeroform, 4 x 4, gauze and a short-arm volar slab splint.  The patient was then awakened from anesthesia and returned to the Postanesthesia Care Unit in stable condition.  There were no complications.     I was present and scrubbed for the critical portions of the procedure.     POSTOPERATIVE PLAN FOR THE PATIENT:  The patient will be discharged home in   stable condition.   shewill remain nonweightbearing to the right upper extremity during the healing process.  I will reevaluate her in approximately 2 weeks for suture removal and reevaluation of the postoperative plan.

## 2023-12-08 NOTE — ANESTHESIA PREPROCEDURE EVALUATION
12/08/2023  Pre-operative evaluation for Procedure(s) (LRB):  ORIF, FRACTURE, RADIUS, DISTAL - RIGHT vs bride plate (Right)  RELEASE, CARPAL TUNNEL - RIGHT (Right)  ORIF, FRACTURE, ULNA - RIGHT (Right)    Lu White is a 89 y.o. female HTN, TR, CKD3, bradycardia s/p PPM    Patient Active Problem List   Diagnosis    Insomnia    HTN (hypertension)    HLD (hyperlipidemia)    SOBOE (shortness of breath on exertion)    Diverticular disease    Diastolic dysfunction    Low oxygen saturation    Symptomatic bradycardia    Chronic diastolic heart failure    Family history of other specified conditions    Complete tear of right rotator cuff    Other secondary pulmonary hypertension    Primary osteoarthritis of right knee    Rheumatic tricuspid insufficiency    Senile purpura    Shoulder pain    Stage 3 chronic kidney disease    Subacromial bursitis    Subacromial impingement    Tricuspid valve disorder    SSS (sick sinus syndrome)    Cardiac pacemaker in situ    S/P total knee replacement using cement, left    Closed fracture of right distal radius    Normocytic anemia       Review of patient's allergies indicates:  No Known Allergies    No current facility-administered medications on file prior to encounter.     Current Outpatient Medications on File Prior to Encounter   Medication Sig Dispense Refill    acetaminophen (TYLENOL) 500 MG tablet Take 425 mg by mouth daily as needed.      calcium-vitamin D3 (OS-VIJAY 500 + D3) 500 mg(1,250mg) -200 unit per tablet Take 1 tablet by mouth 2 (two) times daily with meals.      ginkgo biloba 120 mg Tab Take 1 tablet by mouth once daily.  0    hydroCHLOROthiazide (HYDRODIURIL) 25 MG tablet Take 1 tablet by mouth once daily 90 tablet 3    lovastatin (MEVACOR) 20 MG tablet TAKE 1 TABLET BY MOUTH ONCE DAILY IN THE EVENING 90 tablet 3    multivitamin with minerals tablet Take 1  "tablet by mouth once daily.      temazepam (RESTORIL) 15 mg Cap TAKE 1 CAPSULE (15 MG TOTAL) BY MOUTH EVERY EVENING. 30 capsule 5    aspirin 325 MG tablet Take 325 mg by mouth once daily. 1/2 tablet daily      ondansetron (ZOFRAN-ODT) 4 MG TbDL Take 1 tablet (4 mg total) by mouth every 8 (eight) hours as needed (nausea). (Patient not taking: Reported on 2023) 12 tablet 0    traMADoL (ULTRAM) 50 mg tablet Take 1 tablet (50 mg total) by mouth every 6 (six) hours as needed for Pain. 20 tablet 0       Past Surgical History:   Procedure Laterality Date    A-V CARDIAC PACEMAKER INSERTION N/A 2021    Procedure: INSERTION, CARDIAC PACEMAKER, DUAL CHAMBER;  Surgeon: Brayden Coats MD;  Location: [facility];  Service: Cardiology;  Laterality: N/A;  SB, Dual PPM, SJM, MAC, DM, 3 Prep    EYE SURGERY      both eyes catarac    HYSTERECTOMY  1970    JOINT REPLACEMENT      bilateral knee    OVARY SURGERY      ROTATOR CUFF REPAIR Right     TOTAL KNEE REPLACEMENT USING COMPUTER NAVIGATION Right     TOTAL SHOULDER ARTHROPLASTY Right        Social History     Socioeconomic History    Marital status:    Tobacco Use    Smoking status: Never    Smokeless tobacco: Never   Substance and Sexual Activity    Alcohol use: No    Drug use: No         CBC:   Recent Labs     23  0857   WBC 8.37   RBC 3.87*   HGB 11.4*   HCT 35.9*      MCV 93   MCH 29.5   MCHC 31.8*       CMP:   Recent Labs     23  0857      K 4.0      CO2 26   BUN 28   CREATININE 0.8   GLU 72   CALCIUM 10.0   ALBUMIN 3.6   PROT 6.6   ALKPHOS 90   ALT 12   AST 18   BILITOT 0.5       INR  No results for input(s): "PT", "INR", "PROTIME", "APTT" in the last 72 hours.        Diagnostic Studies:      EKD Echo:  Results for orders placed or performed during the hospital encounter of 17   2D Echo w/ Color Flow Doppler   Result Value Ref Range    EF + QEF 65 55 - 65    Mitral Valve Regurgitation TRIVIAL     Diastolic " Dysfunction Yes (A)     Est. PA Systolic Pressure 30.04     Mitral Valve Mobility NORMAL     Tricuspid Valve Regurgitation MILD TO MODERATE           Pre-op Assessment    I have reviewed the Patient Summary Reports.     I have reviewed the Nursing Notes. I have reviewed the NPO Status.   I have reviewed the Medications.     Review of Systems  Cardiovascular:     Hypertension                                        Renal/:  Chronic Renal Disease                Musculoskeletal:  Arthritis                   Physical Exam  General: Well nourished and Cooperative    Airway:  Mallampati: II   Mouth Opening: Normal  TM Distance: Normal  Tongue: Normal  Neck ROM: Normal ROM    Chest/Lungs:  Clear to auscultation, Normal Respiratory Rate    Heart:  Rate: Normal  Rhythm: Regular Rhythm  Sounds: Normal        Anesthesia Plan  Type of Anesthesia, risks & benefits discussed:    Anesthesia Type: Gen Natural Airway  Intra-op Monitoring Plan: Standard ASA Monitors  Post Op Pain Control Plan: multimodal analgesia and IV/PO Opioids PRN  Induction:  IV  Airway Plan: Direct and Video, Post-Induction  Informed Consent: Informed consent signed with the Patient and all parties understand the risks and agree with anesthesia plan.  All questions answered.   ASA Score: 3    Ready For Surgery From Anesthesia Perspective.     .

## 2023-12-11 NOTE — ANESTHESIA POSTPROCEDURE EVALUATION
Anesthesia Post Evaluation    Patient: Lu White    Procedure(s) Performed: Procedure(s) (LRB):  ORIF, FRACTURE, RADIUS, DISTAL - RIGHT vs bride plate (Right)  RELEASE, CARPAL TUNNEL - RIGHT (Right)  ORIF, FRACTURE, ULNA - RIGHT (Right)    Final Anesthesia Type: general      Patient location during evaluation: PACU  Patient participation: Yes- Able to Participate  Level of consciousness: awake and alert  Post-procedure vital signs: reviewed and stable  Pain management: adequate  Airway patency: patent  HASFA mitigation strategies: Multimodal analgesia  PONV status at discharge: No PONV  Anesthetic complications: no      Cardiovascular status: blood pressure returned to baseline and hemodynamically stable  Respiratory status: unassisted  Hydration status: euvolemic  Follow-up not needed.              Vitals Value Taken Time   /61 12/08/23 1431   Temp 36.5 °C (97.7 °F) 12/08/23 1430   Pulse 60 12/08/23 1437   Resp 20 12/08/23 1437   SpO2 96 % 12/08/23 1436   Vitals shown include unvalidated device data.      Event Time   Out of Recovery 14:24:00         Pain/Marcelo Score: No data recorded

## 2023-12-26 ENCOUNTER — OFFICE VISIT (OUTPATIENT)
Dept: ORTHOPEDICS | Facility: CLINIC | Age: 88
End: 2023-12-26
Payer: MEDICARE

## 2023-12-26 ENCOUNTER — HOSPITAL ENCOUNTER (OUTPATIENT)
Dept: RADIOLOGY | Facility: HOSPITAL | Age: 88
Discharge: HOME | End: 2023-12-26
Attending: PHYSICIAN ASSISTANT
Payer: MEDICARE

## 2023-12-26 DIAGNOSIS — M25.531 RIGHT WRIST PAIN: ICD-10-CM

## 2023-12-26 DIAGNOSIS — G56.01 CARPAL TUNNEL SYNDROME ON RIGHT: ICD-10-CM

## 2023-12-26 DIAGNOSIS — M25.531 RIGHT WRIST PAIN: Primary | ICD-10-CM

## 2023-12-26 DIAGNOSIS — S52.501A CLOSED FRACTURE OF DISTAL END OF RIGHT RADIUS, UNSPECIFIED FRACTURE MORPHOLOGY, INITIAL ENCOUNTER: Primary | ICD-10-CM

## 2023-12-26 DIAGNOSIS — Z98.890 POSTOPERATIVE STATE: ICD-10-CM

## 2023-12-26 PROCEDURE — 99024 POSTOP FOLLOW-UP VISIT: CPT | Mod: S$GLB,,, | Performed by: PHYSICIAN ASSISTANT

## 2023-12-26 PROCEDURE — 99024 PR POST-OP FOLLOW-UP VISIT: ICD-10-PCS | Mod: S$GLB,,, | Performed by: PHYSICIAN ASSISTANT

## 2023-12-26 PROCEDURE — 73110 X-RAY EXAM OF WRIST: CPT | Mod: 26,RT,, | Performed by: RADIOLOGY

## 2023-12-26 PROCEDURE — 3288F PR FALLS RISK ASSESSMENT DOCUMENTED: ICD-10-PCS | Mod: CPTII,S$GLB,, | Performed by: PHYSICIAN ASSISTANT

## 2023-12-26 PROCEDURE — 1159F PR MEDICATION LIST DOCUMENTED IN MEDICAL RECORD: ICD-10-PCS | Mod: CPTII,S$GLB,, | Performed by: PHYSICIAN ASSISTANT

## 2023-12-26 PROCEDURE — 1159F MED LIST DOCD IN RCRD: CPT | Mod: CPTII,S$GLB,, | Performed by: PHYSICIAN ASSISTANT

## 2023-12-26 PROCEDURE — 3288F FALL RISK ASSESSMENT DOCD: CPT | Mod: CPTII,S$GLB,, | Performed by: PHYSICIAN ASSISTANT

## 2023-12-26 PROCEDURE — 99999 PR PBB SHADOW E&M-EST. PATIENT-LVL III: ICD-10-PCS | Mod: PBBFAC,,, | Performed by: PHYSICIAN ASSISTANT

## 2023-12-26 PROCEDURE — 1101F PT FALLS ASSESS-DOCD LE1/YR: CPT | Mod: CPTII,S$GLB,, | Performed by: PHYSICIAN ASSISTANT

## 2023-12-26 PROCEDURE — 73110 XR WRIST COMPLETE 3 VIEWS RIGHT: ICD-10-PCS | Mod: 26,RT,, | Performed by: RADIOLOGY

## 2023-12-26 PROCEDURE — 73110 X-RAY EXAM OF WRIST: CPT | Mod: TC,RT

## 2023-12-26 PROCEDURE — 99999 PR PBB SHADOW E&M-EST. PATIENT-LVL III: CPT | Mod: PBBFAC,,, | Performed by: PHYSICIAN ASSISTANT

## 2023-12-26 PROCEDURE — 1125F AMNT PAIN NOTED PAIN PRSNT: CPT | Mod: CPTII,S$GLB,, | Performed by: PHYSICIAN ASSISTANT

## 2023-12-26 PROCEDURE — 1101F PR PT FALLS ASSESS DOC 0-1 FALLS W/OUT INJ PAST YR: ICD-10-PCS | Mod: CPTII,S$GLB,, | Performed by: PHYSICIAN ASSISTANT

## 2023-12-26 PROCEDURE — 1125F PR PAIN SEVERITY QUANTIFIED, PAIN PRESENT: ICD-10-PCS | Mod: CPTII,S$GLB,, | Performed by: PHYSICIAN ASSISTANT

## 2023-12-26 NOTE — PROGRESS NOTES
Dr. Burkett is the supervising physician for this encounter/patient    Lu White presents for post-operative evaluation.  The patient is now 2 weeks s/p Right ORIF distal radius fracture and carpal tunnel release with Dr. Burkett on 12/8/23.  Overall the patient reports doing well.  She reports 6/10 pain off/on, denies any f/c/s. She reports improvement in the numbness. She is taking Tylenol and Ibuprofen as needed.    PE:    AA&O x 4.  NAD  HEENT:  NCAT, sclera nonicteric  Lungs:  Respirations are equal and unlabored.  CV:  2+ bilateral upper and lower extremity pulses.  MSK: The wound is healing well with no signs of erythema or warmth.  There is no drainage.  No clinical signs or symptoms of infection are present.  Decreased full fist but active motion is present. SILT. DNVI.    IMAGING - reviewed with patient  Stable appearance of ORIF distal radius, no hardware complications    A/P: Status post above, doing well  1) Transition to brace, OT for postop rehab.  2) All sutures removed today. Wound care and signs of infection discussed. Scar massage discussed.  3) F/U 4 weeks, repeat xrays.  4) Call with any questions/concerns in the interim      Jamie Russo-DiGeorge PA-C

## 2023-12-28 ENCOUNTER — CLINICAL SUPPORT (OUTPATIENT)
Dept: REHABILITATION | Facility: HOSPITAL | Age: 88
End: 2023-12-28
Payer: MEDICARE

## 2023-12-28 DIAGNOSIS — G56.01 CARPAL TUNNEL SYNDROME ON RIGHT: ICD-10-CM

## 2023-12-28 DIAGNOSIS — M25.531 ARTHRALGIA OF RIGHT WRIST: ICD-10-CM

## 2023-12-28 DIAGNOSIS — S52.501A CLOSED FRACTURE OF DISTAL END OF RIGHT RADIUS, UNSPECIFIED FRACTURE MORPHOLOGY, INITIAL ENCOUNTER: ICD-10-CM

## 2023-12-28 DIAGNOSIS — Z98.890 POSTOPERATIVE STATE: ICD-10-CM

## 2023-12-28 DIAGNOSIS — M25.60 STIFFNESS IN JOINT: Primary | ICD-10-CM

## 2023-12-28 PROCEDURE — 97110 THERAPEUTIC EXERCISES: CPT | Mod: PN

## 2023-12-28 PROCEDURE — 97166 OT EVAL MOD COMPLEX 45 MIN: CPT | Mod: PN

## 2023-12-28 NOTE — PATIENT INSTRUCTIONS
"[facility], OCCUPATIONAL THERAPY  HOME EXERCISE PROGRAM     Complete the following two massages for 2 minutes, 2-3x/day:                                                       Complete the following exercises for 10-15 repetitions, 2-3x/day:         AROM: Supination / Pronation   With your elbow by your side, turn your   palm up then turn your palm down.      AROM: Wrist Flexion / Extension               Bend your wrist forward and back as far as possible.          AROM: Wrist Radial / Ulnar Deviation  Bend your wrist from side to side as far as possible.        AROM: Isolated MCP Flexion / Extension ("Wave")   Bend only your large, bottom knuckles. Hold 3 seconds.   Keep the tips of your fingers straight. Straighten fingers.      AROM: Isolated IPJ Flexion / Extension ("Hook")   Bend only your middle and end knuckles. Hold 3 seconds.   Straighten your fingers.       AROM: MCP and PIP Flexion / Extension ("Straight Fist")  Bend your bottom and middle knuckles, keeping the tips of your fingers straight.   Try to touch the pads of your fingers on your palm. Hold 3 seconds.   Straighten your fingers.       AROM: Composite Flexion / Extension ("Full Fist")  Bend every joint in your hand into a fist. Hold 3 seconds.   Straighten your fingers.         AROM: Composte Extension ("Finger Lifts")  Lift your finger off of the table one at a time. Hold 3 seconds.   Relax your finger.      AROM: Abduction / Adduction  With hand flat on table, spread all fingers apart,   then bring them together as close as possible.          AROM: Composite Flexion   Bend both joints of thumb as far as possible.   Try to touch base of little finger.      AROM: Radial Adduction / Abduction  Place your palm flat on the table. Move thumb out   to side. Move back alongside index finger.                                       AROM: Palmar Adduction / Abduction   Rest your small finger on the table. Move thumb   sideways, out and away " "from   palm. Move back to rest along palm.                        AROM: Composite Flesion ("Pinky Slides")  Touch thumb to tip of small finger. Slide thumb down   small finger into palm.         AROM: MP Extension   With palm on table, lift thumb up.   Hold 3 seconds. Relax and lower thumb.      Therapist: Andreas HALE/SCARLETT         "

## 2023-12-28 NOTE — PLAN OF CARE
[facility]  Occupational Therapy Initial Evaluation     Name: Lu White  Clinic Number: [id number]    Therapy Diagnosis:   Encounter Diagnoses   Name Primary?    Closed fracture of distal end of right radius, unspecified fracture morphology, initial encounter     Carpal tunnel syndrome on right     Postoperative state     Stiffness in joint Yes    Arthralgia of right wrist      Physician: Russo-Digeorge, Jamie L*    Physician Orders: Eval and Treat  Medical Diagnosis: ORIF DRF with CTR  Surgical Procedure and Date: ORIF with CTR of R arm , 12/8/23  Date of Injury: 11/26/23  Evaluation Date: 12/28/2023  Insurance Authorization Period Expiration: 12/31/23  Plan of Care Certification Period: 2/16/24  Date of Return to MD:   Visit # / Visits authorized: 1 / 1  FOTO: 1/3    Precautions:  Standard, Fall, and Weightbearing    Time In: 11:05 am   Time Out: 11:50  Total Appointment Time (timed & untimed codes): 45 minutes    Subjective     Date of Onset: 11/26/23, 12/8/23    History of Current Condition/Mechanism of Injury: Fall after using metal basket.     Involved Side: R  Dominant Side: Right    Mechanism of Injury: Fall   Surgical Procedure: ORIF DRF, CTR  Imaging: bone scan films     Prior Therapy: some for her Shoulder and knee replacements.     Pain:  Functional Pain Scale Rating 0-10:   1/10 on average  0/10 at best  8/10 at worst  Location: Ulnar aspect of forearm and ulnar styloid at area of fracture.   Description: Aching, Dull, Sharp, and Shooting  Aggravating Factors: Extension, Flexing, Lifting, and supination   Easing Factors: pain medication and rest    Occupation:  retired   Working presently: unemployed  Duties: does ADLs and IADLs and has assist most of the time     Functional Limitations/Social History:    Previous functional status includes: Independent with all ADLs.     Current Functional Status   Home/Living environment: lives alone, stays with her overnight        Limitation of Functional Status as follows:   ADLs/IADLs:     - Feeding: min A    - Bathing: MIN a     - Dressing/Grooming: Min A     - Home Management: MIN A     - Driving: min A      Leisure: Driving    Patient's Goals for Therapy: increase use and reduce pain.     Past Medical History/Physical Systems Review:   Lu White  has a past medical history of Arthritis, Disorder of kidney and ureter, Diverticular disease, Hyperlipidemia, and Hypertension.    Lu White  has a past surgical history that includes Hysterectomy (1970); Eye surgery; Ovary surgery; Rotator cuff repair (Right); Total knee replacement using computer navigation (Right); Total shoulder arthroplasty (Right); A-V cardiac pacemaker insertion (N/A, 11/01/2021); Joint replacement; Open reduction and internal fixation (ORIF) of fracture of distal radius (Right, 12/8/2023); Carpal tunnel release (Right, 12/8/2023); and Open reduction and internal fixation (ORIF) of fracture of ulna (Right, 12/8/2023).    Lu has a current medication list which includes the following prescription(s): acetaminophen, aspirin, calcium-vitamin d3, ginkgo biloba, hydrochlorothiazide, ibuprofen, lovastatin, multivitamin with minerals, ondansetron, oxycodone-acetaminophen, temazepam, and tramadol, and the following Facility-Administered Medications: fentanyl and midazolam.    Review of patient's allergies indicates:  No Known Allergies     Objective     Observation/Appearance: Noted swelling and edema in the wrist, ulnar aspect of pain.     Special Tests: none     Edema. Measured in centimeters.   12/28/2023 12/28/2023      Left Right     Wrist Crease 16 18.5     Distal Palmar Crease 18 19.2         Elbow and Wrist ROM. Measured in degrees.   12/28/2023 12/28/2023      Left Right     Elbow Ext/Flex       Supination/Pronation wnl 50/WNL     Wrist Ext/Flex 61/71 41/42     Wrist RD/UD 12/40 8/21       Hand ROM. Measured in degrees.   12/28/2023 12/28/2023       Left Right            Index: MP                   PIP                      DIP              Long:  MP                  PIP                  DIP              Ring:   MP                  PIP                  DIP              Small:  MP                   PIP                   DIP              Thumb: MP                    IP           Rad ABD           Pal ABD              Opposition          Strength (Dynamometer) and Pinch Strength (Pinch Gauge)  Measured in pounds.   12/28/2023 12/28/2023      Left Right     Rung II       [place] Pinch       3pt Pinch         Sensation: WNL   12/28/2023 12/28/2023    Left Right   [place] Edie     Normal 1.65-2.83     Diminished Light Touch 3.22-3.61     Diminished Protective 3.84-4.31     Loss of Protective 4.56-6.65     Untestable >6.65         CMS Impairment/Limitation/Restriction for FOTO Hand Survey    Therapist reviewed FOTO scores for Lu White on 12/28/2023.   FOTO documents entered into [employer] - see Media section.    Limitation Score: TBD%         Treatment     Total Treatment time (time-based codes) separate from Evaluation: 15 minutes    Lu received the following supervised modalities after being cleared for contradictions for 5 minutes:   -.MHP to R x 5 minutes in order to increase extensibility of tissues prior to MT.      Lu received the following manual therapy techniques for 0 minutes:   -Pt received retrograde massage as well as scar massage to decrease edema and stiffness for increased ROM. STM performed to decreased stiffness in surrounding musculature.       Lu received therapeutic activities for 10 minutes including:  -Pt provided with written instructions, demonstration and education of HEP with pt demonstrating and verbalizing understanding of appropriate movements and techniques to increase strength, ROM and activity tolerance for increased IND.      Patient Education and Home Exercises      Education provided:   -role of OT, goals for OT,  scheduling/cancellations, insurance limitations with patient.  -Additional Education provided: HEP in place.    Written Home Exercises Provided: Patient instructed to cont prior HEP.  Exercises were reviewed and Lu was able to demonstrate them prior to the end of the session.    Lu demonstrated fair  understanding of the education provided.     Pt was advised to perform these exercises free of pain, and to stop performing them if pain occurs.    See EMR under Patient Instructions for exercises provided 12/28/2023.    Assessment     Lu White is a 89 y.o. female referred to outpatient occupational therapy and presents with a medical diagnosis of R CTR and ORIF with DRF of R .      Assessment of Occupational Performance   Performance Deficits    Physical:  Joint Mobility  Joint Stability  Muscle Power/Strength  Muscle Endurance  Skin Integrity/Scar Formation  Edema   Strength  Pinch Strength  Postural Control  Pain    Cognitive:  Safety Awareness/Insight to Disability    Psychosocial:    Habits  Routines  Rituals     Following medical record review it is determined that pt will benefit from occupational therapy services in order to maximize pain free and/or functional use of right hand and arm . The following goals were discussed with the patient and patient is in agreement with them as to be addressed in the treatment plan. The patient's rehab potential is Fair.     Anticipated barriers to occupational therapy: displaced fracture of R ulna and ulnar styloid not healed.     Plan of care discussed with patient: Yes  Patient's spiritual, cultural and educational needs considered and patient is agreeable to the plan of care and goals as stated below:     Medical Necessity is demonstrated by the following  Occupational Profile/History  Co-morbidities and personal factors that may impact the plan of care [] LOW: Brief chart review  [x] MODERATE: Expanded chart review   [] HIGH: Extensive chart  review    Moderate / High Support Documentation:      Examination  Performance deficits relating to physical, cognitive or psychosocial skills that result in activity limitations and/or participation restrictions  [] LOW: addressing 1-3 Performance deficits  [x] MODERATE: 3-5 Performance deficits  [] HIGH: 5+ Performance deficits (please support below)    Moderate / High Support Documentation:      Treatment Options [] LOW: Limited options  [x] MODERATE: Several options  [] HIGH: Multiple options      Decision Making/ Complexity Score: moderate       The following goals were discussed with the patient and patient is in agreement with them as to be addressed in the treatment plan.     Goals:   The following goals were discussed with the patient and patient is in agreement with them as to be addressed in the treatment plan.   Short term Goals:  1) Initiate HEP  2) Pt will increase AROM of L hand and wrist by 5-10 degrees in order to assist with carrying moving and handling by 4 weeks.  3) Pt will reduce edema by .5-1 cm in affected R wrist by 4 weeks.  4) Pt will reduce pain to less than 4/10 by 4 weeks.  5) Pt will increase functional  strength by 5# in order to A in opening containers for med management or home management tasks by 4 weeks.   6) Patient will be able to achieve less than or equal to 50% on the FOTO, demonstrating overall improved functional ability with upper extremity. (Self-care category)    Long Term Goals:  Goals to be met by discharge:  1) Independent with HEP  2) Pt will increase R hand CHAMBERLAIN by 25% degrees in order to increase functional use for grasp with home management or work related tasks by d/c.   3) Pt will decrease edema to trace or none to increase functional ROM by d/c.   4) Pt will decrease pain to trace or none while completing light home management tasks or work related tasks by d/c.   5) Patient will be able to achieve less than or equal to 25% on the FOTO, demonstrating overall  improved functional ability with upper extremity.  (Self-care category)      Plan     Certification Period/Plan of care expiration: 12/28/2023 to 2/16/24.    Outpatient Occupational Therapy 2 times weekly for 8 weeks to include the following interventions: Paraffin, Fluidotherapy, Manual therapy/joint mobilizations, Modalities for pain management, Therapeutic exercises/activities., Strengthening, Orthotic Fabrication/Fit/Training, Edema Control, Scar Management, Joint Protection, and Energy Conservation.    Andreas Meeks, OT

## 2024-01-02 ENCOUNTER — CLINICAL SUPPORT (OUTPATIENT)
Dept: REHABILITATION | Facility: HOSPITAL | Age: 89
End: 2024-01-02
Payer: MEDICARE

## 2024-01-02 DIAGNOSIS — M25.60 STIFFNESS IN JOINT: Primary | ICD-10-CM

## 2024-01-02 DIAGNOSIS — M25.531 ARTHRALGIA OF RIGHT WRIST: ICD-10-CM

## 2024-01-02 PROCEDURE — 97140 MANUAL THERAPY 1/> REGIONS: CPT | Mod: PN | Performed by: OCCUPATIONAL THERAPIST

## 2024-01-02 PROCEDURE — 97018 PARAFFIN BATH THERAPY: CPT | Mod: PN | Performed by: OCCUPATIONAL THERAPIST

## 2024-01-02 PROCEDURE — 97530 THERAPEUTIC ACTIVITIES: CPT | Mod: PN | Performed by: OCCUPATIONAL THERAPIST

## 2024-01-02 NOTE — PROGRESS NOTES
Occupational Therapy Treatment Note     Date: 1/2/2024  Name: Lu White  Clinic Number: [id number]    Therapy Diagnosis:   Encounter Diagnoses   Name Primary?    Stiffness in joint Yes    Arthralgia of right wrist      Physician: Russo-Digeorge, Jamie L*    Physician Orders: Eval and Treat  Medical Diagnosis: ORIF DRF with CTR  Surgical Procedure and Date: ORIF with CTR of R arm , 12/8/23  Date of Injury: 11/26/23  Evaluation Date: 12/28/2023  Insurance Authorization Period Expiration: 12/31/23  Plan of Care Certification Period: 2/16/24  Date of Return to MD:   Visit # / Visits authorized: 1 / 1  FOTO: 1/3    Time In: 12:00 pm  Time Out: 12:40 pm  Total Billable Time: 40 minutes    Precautions:  Standard and Weightbearing      Subjective     Pt reports: no pain at rest but c/o pain with forearm rotation  she was compliant with home exercise program given last session.   Response to previous treatment: decrease pain and increased ROM    Pain: 0/10  Location: right wrist     OBJECTIVE     Observation/Appearance: Noted swelling and edema in the wrist, ulnar aspect of pain.      Special Tests: none      Edema. Measured in centimeters.    12/28/2023 12/28/2023         Left Right       Wrist Crease 16 18.5       Distal Palmar Crease 18 19.2             Elbow and Wrist ROM. Measured in degrees.    12/28/2023 12/28/2023         Left Right       Elbow Ext/Flex           Supination/Pronation wnl 50/WNL       Wrist Ext/Flex 61/71 41/42       Wrist RD/UD 12/40 8/21          Hand ROM. Measured in degrees.    12/28/2023 12/28/2023         Left Right                   Index: MP                       PIP                          DIP                       Long:  MP                      PIP                      DIP                       Ring:   MP                      PIP                      DIP                       Small:  MP                       PIP                       DIP                       Thumb: MP                         IP               Rad ABD               Pal ABD                  Opposition               Strength (Dynamometer) and Pinch Strength (Pinch Gauge)  Measured in pounds.    12/28/2023 12/28/2023         Left Right       Rung II           Key Pinch           3pt Pinch               Treatment     Lu received the following supervised modalities after being cleared for contradictions for 10 minutes:   -Paraffin to decrease pain and stiffness and increase tissue elasticity    Lu received the following manual therapy techniques for 10 minutes:   -IASTM and scar massage    Lu received therapeutic activities for 20 minutes including:  -TGEs  -Wrist AROM  -Wrist maze x 3 min  -Wrist 3 ways with dowel x 10  -[place]  with manipulation and translation x 1 container  -Rubber twist x 20  -Eros Balance proprioception stick x 2 min    Home Exercises and Education Provided     Education provided:   - diagnosis, precautions and progression of treatment  - Progress towards goals     Written Home Exercises Provided: Patient instructed to cont prior HEP.  Exercises were reviewed and Lu was able to demonstrate them prior to the end of the session.  Lu demonstrated good  understanding of the HEP provided.   .   See EMR under Patient Instructions for exercises provided prior visit.        Assessment     Pt would continue to benefit from skilled OT. She performed all exercises with min difficulty. Mild pain reported with forearm rotation. Plan to progress as tolerated.      Lu is progressing well towards her goals and there are no updates to goals at this time. Pt prognosis is Excellent.     Pt will continue to benefit from skilled outpatient occupational therapy to address the deficits listed in the problem list on initial evaluation provide pt/family education and to maximize pt's level of independence in the home and community environment.     Anticipated barriers to occupational therapy: none    Pt's  spiritual, cultural and educational needs considered and pt agreeable to plan of care and goals.    Goals:  The following goals were discussed with the patient and patient is in agreement with them as to be addressed in the treatment plan.   Short term Goals:  1) Initiate HEP  2) Pt will increase AROM of L hand and wrist by 5-10 degrees in order to assist with carrying moving and handling by 4 weeks.  3) Pt will reduce edema by .5-1 cm in affected R wrist by 4 weeks.  4) Pt will reduce pain to less than 4/10 by 4 weeks.  5) Pt will increase functional  strength by 5# in order to A in opening containers for med management or home management tasks by 4 weeks.   6) Patient will be able to achieve less than or equal to 50% on the FOTO, demonstrating overall improved functional ability with upper extremity. (Self-care category)     Long Term Goals:  Goals to be met by discharge:  1) Independent with HEP  2) Pt will increase R hand CHAMBERLAIN by 25% degrees in order to increase functional use for grasp with home management or work related tasks by d/c.   3) Pt will decrease edema to trace or none to increase functional ROM by d/c.   4) Pt will decrease pain to trace or none while completing light home management tasks or work related tasks by d/c.   5) Patient will be able to achieve less than or equal to 25% on the FOTO, demonstrating overall improved functional ability with upper extremity.  (Self-care category)    Plan   Continue with POC  Updates/Grading for next session: progress as tolerated      Margaret Boasberg, OT

## 2024-01-04 ENCOUNTER — CLINICAL SUPPORT (OUTPATIENT)
Dept: REHABILITATION | Facility: HOSPITAL | Age: 89
End: 2024-01-04
Payer: MEDICARE

## 2024-01-04 DIAGNOSIS — M25.531 ARTHRALGIA OF RIGHT WRIST: ICD-10-CM

## 2024-01-04 DIAGNOSIS — M25.60 STIFFNESS IN JOINT: Primary | ICD-10-CM

## 2024-01-04 PROCEDURE — 97140 MANUAL THERAPY 1/> REGIONS: CPT | Mod: PN

## 2024-01-04 PROCEDURE — 97530 THERAPEUTIC ACTIVITIES: CPT | Mod: PN

## 2024-01-04 PROCEDURE — 97018 PARAFFIN BATH THERAPY: CPT | Mod: PN,59

## 2024-01-04 NOTE — PROGRESS NOTES
Occupational Therapy Treatment Note     Date: 1/4/2024  Name: Lu White  Clinic Number: [id number]    Therapy Diagnosis:   Encounter Diagnoses   Name Primary?    Stiffness in joint Yes    Arthralgia of right wrist      Physician: Russo-Digeorge, Jamie L*    Physician Orders: Eval and Treat  Medical Diagnosis: ORIF DRF with CTR  Surgical Procedure and Date: ORIF with CTR of R arm , 12/8/23  Date of Injury: 11/26/23  Evaluation Date: 12/28/2023  Insurance Authorization Period Expiration: 12/31/23  Plan of Care Certification Period: 2/16/24  Date of Return to MD:   Visit # / Visits authorized: 1 / 1  FOTO: 1/3    Time In: 1145 pm  Time Out: 12:25 pm  Total Billable Time: 40 minutes    Precautions:  Standard and Weightbearing      Subjective     Pt reports: its moving better than last time   she was compliant with home exercise program given last session.   Response to previous treatment: decrease pain and increased ROM    Pain: 0/10  Location: right wrist     OBJECTIVE     Observation/Appearance: Noted swelling and edema in the wrist, ulnar aspect of pain.      Special Tests: none      Edema. Measured in centimeters.    12/28/2023 12/28/2023         Left Right       Wrist Crease 16 18.5       Distal Palmar Crease 18 19.2             Elbow and Wrist ROM. Measured in degrees.    12/28/2023 12/28/2023         Left Right       Elbow Ext/Flex           Supination/Pronation wnl 50/WNL       Wrist Ext/Flex 61/71 41/42       Wrist RD/UD 12/40 8/21          Hand ROM. Measured in degrees.    12/28/2023 12/28/2023         Left Right                   Index: MP                       PIP                          DIP                       Long:  MP                      PIP                      DIP                       Ring:   MP                      PIP                      DIP                       Small:  MP                       PIP                       DIP                       Thumb: MP                        IP                Rad ABD               Pal ABD                  Opposition               Strength (Dynamometer) and Pinch Strength (Pinch Gauge)  Measured in pounds.    12/28/2023 12/28/2023         Left Right       Rung II           Key Pinch           3pt Pinch               Treatment     Lu received the following supervised modalities after being cleared for contradictions for 10 minutes:   -Paraffin to decrease pain and stiffness and increase tissue elasticity    Lu received the following manual therapy techniques for 10 minutes:   -IASTM and scar massage    Lu received therapeutic activities for 20 minutes including:  -TGEs  -Wrist AROM  -Wrist maze x 3 min  -Wrist 3 ways with dowel x 10  -[place]  with manipulation and translation x 1 container  -Rubber twist x 20  -Eros Balance proprioception stick x 2 min  - Wrist PREs 0# 3 ways   2/10     Home Exercises and Education Provided     Education provided:   - diagnosis, precautions and progression of treatment  - Progress towards goals     Written Home Exercises Provided: Patient instructed to cont prior HEP.  Exercises were reviewed and Lu was able to demonstrate them prior to the end of the session.  Lu demonstrated good  understanding of the HEP provided.   .   See EMR under Patient Instructions for exercises provided prior visit.        Assessment     Pt would continue to benefit from skilled OT services, Better ROM of the hand and wrist noted today with less pain overall. She performed all exercises with min difficulty. Mild pain reported with forearm rotation still . Plan to progress as tolerated.      Lu is progressing well towards her goals and there are no updates to goals at this time. Pt prognosis is Excellent.     Pt will continue to benefit from skilled outpatient occupational therapy to address the deficits listed in the problem list on initial evaluation provide pt/family education and to maximize pt's level of independence in  the home and community environment.     Anticipated barriers to occupational therapy: none    Pt's spiritual, cultural and educational needs considered and pt agreeable to plan of care and goals.    Goals:  The following goals were discussed with the patient and patient is in agreement with them as to be addressed in the treatment plan.   Short term Goals:  1) Initiate HEP  2) Pt will increase AROM of L hand and wrist by 5-10 degrees in order to assist with carrying moving and handling by 4 weeks.  3) Pt will reduce edema by .5-1 cm in affected R wrist by 4 weeks.  4) Pt will reduce pain to less than 4/10 by 4 weeks.  5) Pt will increase functional  strength by 5# in order to A in opening containers for med management or home management tasks by 4 weeks.   6) Patient will be able to achieve less than or equal to 50% on the FOTO, demonstrating overall improved functional ability with upper extremity. (Self-care category)     Long Term Goals:  Goals to be met by discharge:  1) Independent with HEP  2) Pt will increase R hand CHAMBERLAIN by 25% degrees in order to increase functional use for grasp with home management or work related tasks by d/c.   3) Pt will decrease edema to trace or none to increase functional ROM by d/c.   4) Pt will decrease pain to trace or none while completing light home management tasks or work related tasks by d/c.   5) Patient will be able to achieve less than or equal to 25% on the FOTO, demonstrating overall improved functional ability with upper extremity.  (Self-care category)    Plan   Continue with POC  Updates/Grading for next session: progress as tolerated      Andreas Meeks OT

## 2024-01-09 ENCOUNTER — CLINICAL SUPPORT (OUTPATIENT)
Dept: REHABILITATION | Facility: HOSPITAL | Age: 89
End: 2024-01-09
Payer: MEDICARE

## 2024-01-09 DIAGNOSIS — M25.531 ARTHRALGIA OF RIGHT WRIST: ICD-10-CM

## 2024-01-09 DIAGNOSIS — M25.60 STIFFNESS IN JOINT: Primary | ICD-10-CM

## 2024-01-09 PROCEDURE — 97018 PARAFFIN BATH THERAPY: CPT | Mod: PN | Performed by: OCCUPATIONAL THERAPIST

## 2024-01-09 PROCEDURE — 97530 THERAPEUTIC ACTIVITIES: CPT | Mod: PN | Performed by: OCCUPATIONAL THERAPIST

## 2024-01-09 PROCEDURE — 97140 MANUAL THERAPY 1/> REGIONS: CPT | Mod: PN | Performed by: OCCUPATIONAL THERAPIST

## 2024-01-09 NOTE — PROGRESS NOTES
Occupational Therapy Treatment Note     Date: 1/9/2024  Name: Lu White  Clinic Number: [id number]    Therapy Diagnosis:   Encounter Diagnoses   Name Primary?    Stiffness in joint Yes    Arthralgia of right wrist      Physician: Russo-Digeorge, Jamie L*    Physician Orders: Eval and Treat  Medical Diagnosis: ORIF DRF with CTR  Surgical Procedure and Date: ORIF with CTR of R arm , 12/8/23  Date of Injury: 11/26/23  Evaluation Date: 12/28/2023  Insurance Authorization Period Expiration: 12/31/23  Plan of Care Certification Period: 2/16/24  Date of Return to MD: 1/23/24  Visit # / Visits authorized: 4 / 20  FOTO: 1/3    Time In: 10:45 am  Time Out: 11:25 pm  Total Billable Time: 40 minutes    Precautions:  Standard and Weightbearing      Subjective     Pt reports: its getting a little better each day. She states she can button buttons, hook bra and brush teeth with R hand  she was compliant with home exercise program given last session.   Response to previous treatment: decrease pain and increased ROM    Pain: 0/10  Location: right wrist     OBJECTIVE     Observation/Appearance: Noted swelling and edema in the wrist, ulnar aspect of pain.      Special Tests: none      Edema. Measured in centimeters.    12/28/2023 12/28/2023         Left Right       Wrist Crease 16 18.5       Distal Palmar Crease 18 19.2             Elbow and Wrist ROM. Measured in degrees.    12/28/2023 12/28/2023         Left Right       Elbow Ext/Flex           Supination/Pronation wnl 50/WNL       Wrist Ext/Flex 61/71 41/42       Wrist RD/UD 12/40 8/21          Hand ROM. Measured in degrees.    12/28/2023 12/28/2023         Left Right                   Index: MP                       PIP                          DIP                       Long:  MP                      PIP                      DIP                       Ring:   MP                      PIP                      DIP                       Small:  MP                       PIP                        DIP                       Thumb: MP                        IP               Rad ABD               Pal ABD                  Opposition               Strength (Dynamometer) and Pinch Strength (Pinch Gauge)  Measured in pounds.    12/28/2023 12/28/2023         Left Right       Rung II           Key Pinch           3pt Pinch               Treatment     Lu received the following supervised modalities after being cleared for contradictions for 10 minutes:   -Paraffin to decrease pain and stiffness and increase tissue elasticity    Lu received the following manual therapy techniques for 10 minutes:   -IASTM and scar massage    Lu received therapeutic activities for 20 minutes including:  -TGEs  -Wrist AROM  -Wrist maze x 3 min  -Wrist 3 ways with dowel x 10  -[place]  with manipulation and translation x 1 container  -Rubber twist x 20  -Eros Balance proprioception stick x 2 min    Home Exercises and Education Provided     Education provided:   - diagnosis, precautions and progression of treatment  - Progress towards goals     Written Home Exercises Provided: Patient instructed to cont prior HEP.  Exercises were reviewed and Lu was able to demonstrate them prior to the end of the session.  Lu demonstrated good  understanding of the HEP provided.   .   See EMR under Patient Instructions for exercises provided prior visit.        Assessment     Pt would continue to benefit from skilled OT services.  She performed all exercises without difficulty. Pt reports improved functional use of R hand, as evidenced by performance of self care tasks. Continued c/o mild pain in ulnar wrist. Plan to progress as tolerated.      Lu is progressing well towards her goals and there are no updates to goals at this time. Pt prognosis is Excellent.     Pt will continue to benefit from skilled outpatient occupational therapy to address the deficits listed in the problem list on initial evaluation provide  pt/family education and to maximize pt's level of independence in the home and community environment.     Anticipated barriers to occupational therapy: none    Pt's spiritual, cultural and educational needs considered and pt agreeable to plan of care and goals.    Goals:  The following goals were discussed with the patient and patient is in agreement with them as to be addressed in the treatment plan.   Short term Goals:  1) Initiate HEP  2) Pt will increase AROM of L hand and wrist by 5-10 degrees in order to assist with carrying moving and handling by 4 weeks.  3) Pt will reduce edema by .5-1 cm in affected R wrist by 4 weeks.  4) Pt will reduce pain to less than 4/10 by 4 weeks.  5) Pt will increase functional  strength by 5# in order to A in opening containers for med management or home management tasks by 4 weeks.   6) Patient will be able to achieve less than or equal to 50% on the FOTO, demonstrating overall improved functional ability with upper extremity. (Self-care category)     Long Term Goals:  Goals to be met by discharge:  1) Independent with HEP  2) Pt will increase R hand CHAMBERLAIN by 25% degrees in order to increase functional use for grasp with home management or work related tasks by d/c.   3) Pt will decrease edema to trace or none to increase functional ROM by d/c.   4) Pt will decrease pain to trace or none while completing light home management tasks or work related tasks by d/c.   5) Patient will be able to achieve less than or equal to 25% on the FOTO, demonstrating overall improved functional ability with upper extremity.  (Self-care category)    Plan   Continue with POC  Updates/Grading for next session: progress as tolerated      Margaret Boasberg, OT

## 2024-01-16 ENCOUNTER — CLINICAL SUPPORT (OUTPATIENT)
Dept: REHABILITATION | Facility: HOSPITAL | Age: 89
End: 2024-01-16
Payer: MEDICARE

## 2024-01-16 DIAGNOSIS — M25.60 STIFFNESS IN JOINT: Primary | ICD-10-CM

## 2024-01-16 DIAGNOSIS — M25.531 ARTHRALGIA OF RIGHT WRIST: ICD-10-CM

## 2024-01-16 PROCEDURE — 97018 PARAFFIN BATH THERAPY: CPT | Mod: PN,59 | Performed by: OCCUPATIONAL THERAPIST

## 2024-01-16 PROCEDURE — 97530 THERAPEUTIC ACTIVITIES: CPT | Mod: PN | Performed by: OCCUPATIONAL THERAPIST

## 2024-01-16 PROCEDURE — 97140 MANUAL THERAPY 1/> REGIONS: CPT | Mod: PN | Performed by: OCCUPATIONAL THERAPIST

## 2024-01-16 NOTE — PROGRESS NOTES
"  Occupational Therapy Treatment Note     Date: 1/16/2024  Name: Lu White  Clinic Number: [id number]    Therapy Diagnosis:   Encounter Diagnoses   Name Primary?    Stiffness in joint Yes    Arthralgia of right wrist      Physician: Russo-Digeorge, Jamie L*    Physician Orders: Eval and Treat  Medical Diagnosis: ORIF DRF with CTR  Surgical Procedure and Date: ORIF with CTR of R arm , 12/8/23  Date of Injury: 11/26/23  Evaluation Date: 12/28/2023  Insurance Authorization Period Expiration: 12/31/23  Plan of Care Certification Period: 2/16/24  Date of Return to MD: 1/23/24  Visit # / Visits authorized: 5 / 20  FOTO: 1/3 (52% limitation)    Time In: 10:45 am  Time Out: 11:25 pm  Total Billable Time: 40 minutes    Precautions:  Standard and Weightbearing      Subjective     Pt reports: "it's still swollen"  she was compliant with home exercise program given last session.   Response to previous treatment: decrease pain and increased ROM    Pain: 0/10  Location: right wrist     OBJECTIVE     Observation/Appearance: Noted swelling and edema in the wrist, ulnar aspect of pain.      Special Tests: none      Edema. Measured in centimeters.    12/28/2023 12/28/2023 1/16/24       Left Right  Right     Wrist Crease 16 18.5 17.7     Distal Palmar Crease 18 19.2 17.9           Elbow and Wrist ROM. Measured in degrees.    12/28/2023 12/28/2023 1/16/24       Left Right  Right     Elbow Ext/Flex           Supination/Pronation wnl 50/WNL  60/WNL     Wrist Ext/Flex 61/71 41/42 45/55     Wrist RD/UD 12/40 8/21  15/30           Strength (Dynamometer) and Pinch Strength (Pinch Gauge)  Measured in pounds.    12/28/2023 12/28/2023         Left Right       Rung II           Key Pinch           3pt Pinch               Treatment     Lu received the following supervised modalities after being cleared for contradictions for 10 minutes:   -Paraffin to decrease pain and stiffness and increase tissue elasticity    Lu received the " following manual therapy techniques for 10 minutes:   -IASTM and scar massage    Lu received therapeutic activities for 20 minutes including:  -TGEs  -Wrist AROM  -Wrist maze x 3 min  -Wrist 3 ways with dowel x 10  -[place]  with manipulation and translation x 1 container  -Rubber twist x 20  -Eros Balance proprioception stick x 2 min    Home Exercises and Education Provided     Education provided:   - diagnosis, precautions and progression of treatment  - Progress towards goals     Written Home Exercises Provided: Patient instructed to cont prior HEP.  Exercises were reviewed and Lu was able to demonstrate them prior to the end of the session.  Lu demonstrated good  understanding of the HEP provided.   .   See EMR under Patient Instructions for exercises provided prior visit.        Assessment     Pt would continue to benefit from skilled OT services.  She demonstrates increased ROM and decreased edema. FOTO survey completed today with 52% limitation. Plan to progress as tolerated.      Lu is progressing well towards her goals and there are no updates to goals at this time. Pt prognosis is Excellent.     Pt will continue to benefit from skilled outpatient occupational therapy to address the deficits listed in the problem list on initial evaluation provide pt/family education and to maximize pt's level of independence in the home and community environment.     Anticipated barriers to occupational therapy: none    Pt's spiritual, cultural and educational needs considered and pt agreeable to plan of care and goals.    Goals:  The following goals were discussed with the patient and patient is in agreement with them as to be addressed in the treatment plan.   Short term Goals:  1) Initiate HEP Met  2) Pt will increase AROM of L hand and wrist by 5-10 degrees in order to assist with carrying moving and handling by 4 weeks. Met  3) Pt will reduce edema by .5-1 cm in affected R wrist by 4 weeks. Met  4)  Pt will reduce pain to less than 4/10 by 4 weeks. Progressing 1/16/2024  5) Pt will increase functional  strength by 5# in order to A in opening containers for med management or home management tasks by 4 weeks.  Progressing 1/16/2024  6) Patient will be able to achieve less than or equal to 50% on the FOTO, demonstrating overall improved functional ability with upper extremity. (Self-care category) Progressing 1/16/2024     Long Term Goals:  Goals to be met by discharge:  1) Independent with HEP Progressing 1/16/2024  2) Pt will increase R hand CHAMBERLAIN by 25% degrees in order to increase functional use for grasp with home management or work related tasks by d/c.  Progressing 1/16/2024  3) Pt will decrease edema to trace or none to increase functional ROM by d/c.  Progressing 1/16/2024  4) Pt will decrease pain to trace or none while completing light home management tasks or work related tasks by d/c. Progressing 1/16/2024  5) Patient will be able to achieve less than or equal to 25% on the FOTO, demonstrating overall improved functional ability with upper extremity.  (Self-care category) Progressing 1/16/2024    Plan   Continue with POC  Updates/Grading for next session: progress as tolerated      Margaret Boasberg, OT

## 2024-01-18 ENCOUNTER — CLINICAL SUPPORT (OUTPATIENT)
Dept: REHABILITATION | Facility: HOSPITAL | Age: 89
End: 2024-01-18
Payer: MEDICARE

## 2024-01-18 DIAGNOSIS — M25.60 STIFFNESS IN JOINT: Primary | ICD-10-CM

## 2024-01-18 DIAGNOSIS — M25.531 ARTHRALGIA OF RIGHT WRIST: ICD-10-CM

## 2024-01-18 PROCEDURE — 97140 MANUAL THERAPY 1/> REGIONS: CPT | Mod: PN | Performed by: OCCUPATIONAL THERAPIST

## 2024-01-18 PROCEDURE — 97018 PARAFFIN BATH THERAPY: CPT | Mod: PN | Performed by: OCCUPATIONAL THERAPIST

## 2024-01-18 PROCEDURE — 97530 THERAPEUTIC ACTIVITIES: CPT | Mod: PN | Performed by: OCCUPATIONAL THERAPIST

## 2024-01-18 NOTE — PROGRESS NOTES
Occupational Therapy Treatment Note     Date: 1/18/2024  Name: Lu White  Clinic Number: [id number]    Therapy Diagnosis:   Encounter Diagnoses   Name Primary?    Stiffness in joint Yes    Arthralgia of right wrist      Physician: Russo-Digeorge, Jamie L*    Physician Orders: Eval and Treat  Medical Diagnosis: ORIF DRF with CTR  Surgical Procedure and Date: ORIF with CTR of R arm , 12/8/23  Date of Injury: 11/26/23  Evaluation Date: 12/28/2023  Insurance Authorization Period Expiration: 12/31/23  Plan of Care Certification Period: 2/16/24  Date of Return to MD: 1/23/24  Visit # / Visits authorized: 6 / 20  FOTO: 1/3 (52% limitation)    Time In: 10:45 am  Time Out: 11:25 pm  Total Billable Time: 40 minutes    Precautions:  Standard and Weightbearing      Subjective     Pt reports:  she knows she is making progress because she can do the sign of the cross. Pt sees the PA on Tuesday  she was compliant with home exercise program given last session.   Response to previous treatment: decrease pain and increased ROM    Pain: 0/10  Location: right wrist     OBJECTIVE     Observation/Appearance: Noted swelling and edema in the wrist, ulnar aspect of pain.      Edema. Measured in centimeters.    12/28/2023 12/28/2023 1/16/24       Left Right  Right     Wrist Crease 16 18.5 17.7     Distal Palmar Crease 18 19.2 17.9           Elbow and Wrist ROM. Measured in degrees.    12/28/2023 12/28/2023 1/16/24       Left Right  Right     Elbow Ext/Flex           Supination/Pronation wnl 50/WNL  60/WNL     Wrist Ext/Flex 61/71 41/42 45/55     Wrist RD/UD 12/40 8/21  15/30           Strength (Dynamometer) and Pinch Strength (Pinch Gauge)  Measured in pounds.    12/28/2023 12/28/2023         Left Right       Rung II           Key Pinch           3pt Pinch               Treatment     Lu received the following supervised modalities after being cleared for contradictions for 10 minutes:   -Paraffin to decrease pain and stiffness  and increase tissue elasticity    Lu received the following manual therapy techniques for 10 minutes:   -IASTM and scar massage    Lu received therapeutic activities for 20 minutes including:  -TGEs  -Wrist AROM  -Wrist maze x 3 min  -Wrist wheel 2 ways x 2 min each  -Wrist 3 ways with dowel x 10  -[place]  with manipulation and translation x 1 container  -Rubber twist x 20  -Eros Balance proprioception stick x 2 min    Home Exercises and Education Provided     Education provided:   - diagnosis, precautions and progression of treatment  - Progress towards goals     Written Home Exercises Provided: Patient instructed to cont prior HEP.  Exercises were reviewed and Lu was able to demonstrate them prior to the end of the session.  Lu demonstrated good  understanding of the HEP provided.   .   See EMR under Patient Instructions for exercises provided prior visit.        Assessment     Pt would continue to benefit from skilled OT services. She reports no pain and increased functional use of R UE. Pt performed all exercises without difficulty. Plan to progress as tolerated.     Lu is progressing well towards her goals and there are no updates to goals at this time. Pt prognosis is Excellent.     Pt will continue to benefit from skilled outpatient occupational therapy to address the deficits listed in the problem list on initial evaluation provide pt/family education and to maximize pt's level of independence in the home and community environment.     Anticipated barriers to occupational therapy: none    Pt's spiritual, cultural and educational needs considered and pt agreeable to plan of care and goals.    Goals:  The following goals were discussed with the patient and patient is in agreement with them as to be addressed in the treatment plan.   Short term Goals:  1) Initiate HEP Met  2) Pt will increase AROM of L hand and wrist by 5-10 degrees in order to assist with carrying moving and handling by  4 weeks. Met  3) Pt will reduce edema by .5-1 cm in affected R wrist by 4 weeks. Met  4) Pt will reduce pain to less than 4/10 by 4 weeks. Progressing 1/18/2024  5) Pt will increase functional  strength by 5# in order to A in opening containers for med management or home management tasks by 4 weeks.  Progressing 1/18/2024  6) Patient will be able to achieve less than or equal to 50% on the FOTO, demonstrating overall improved functional ability with upper extremity. (Self-care category) Progressing 1/18/2024     Long Term Goals:  Goals to be met by discharge:  1) Independent with HEP Progressing 1/18/2024  2) Pt will increase R hand CHAMBERLAIN by 25% degrees in order to increase functional use for grasp with home management or work related tasks by d/c.  Progressing 1/18/2024  3) Pt will decrease edema to trace or none to increase functional ROM by d/c.  Progressing 1/18/2024  4) Pt will decrease pain to trace or none while completing light home management tasks or work related tasks by d/c. Progressing 1/18/2024  5) Patient will be able to achieve less than or equal to 25% on the FOTO, demonstrating overall improved functional ability with upper extremity.  (Self-care category) Progressing 1/18/2024    Plan   Continue with POC  Updates/Grading for next session: progress as tolerated      Margaret Boasberg, OT

## 2024-01-21 ENCOUNTER — CLINICAL SUPPORT (OUTPATIENT)
Dept: CARDIOLOGY | Facility: HOSPITAL | Age: 89
End: 2024-01-21
Payer: MEDICARE

## 2024-01-21 ENCOUNTER — CLINICAL SUPPORT (OUTPATIENT)
Dept: CARDIOLOGY | Facility: HOSPITAL | Age: 89
End: 2024-01-21
Attending: INTERNAL MEDICINE
Payer: MEDICARE

## 2024-01-21 DIAGNOSIS — R00.1 BRADYCARDIA, UNSPECIFIED: ICD-10-CM

## 2024-01-21 PROCEDURE — 93294 REM INTERROG EVL PM/LDLS PM: CPT | Mod: ,,, | Performed by: INTERNAL MEDICINE

## 2024-01-21 PROCEDURE — 93296 REM INTERROG EVL PM/IDS: CPT | Performed by: INTERNAL MEDICINE

## 2024-01-23 ENCOUNTER — OFFICE VISIT (OUTPATIENT)
Dept: ORTHOPEDICS | Facility: CLINIC | Age: 89
End: 2024-01-23
Payer: MEDICARE

## 2024-01-23 ENCOUNTER — HOSPITAL ENCOUNTER (OUTPATIENT)
Dept: RADIOLOGY | Facility: HOSPITAL | Age: 89
Discharge: HOME | End: 2024-01-23
Attending: PHYSICIAN ASSISTANT
Payer: MEDICARE

## 2024-01-23 DIAGNOSIS — M25.531 RIGHT WRIST PAIN: ICD-10-CM

## 2024-01-23 DIAGNOSIS — Z98.890 POSTOPERATIVE STATE: ICD-10-CM

## 2024-01-23 DIAGNOSIS — S52.501A CLOSED FRACTURE OF DISTAL END OF RIGHT RADIUS, UNSPECIFIED FRACTURE MORPHOLOGY, INITIAL ENCOUNTER: Primary | ICD-10-CM

## 2024-01-23 DIAGNOSIS — M25.531 RIGHT WRIST PAIN: Primary | ICD-10-CM

## 2024-01-23 DIAGNOSIS — G56.01 CARPAL TUNNEL SYNDROME ON RIGHT: ICD-10-CM

## 2024-01-23 PROCEDURE — 73110 X-RAY EXAM OF WRIST: CPT | Mod: TC,RT

## 2024-01-23 PROCEDURE — 99999 PR PBB SHADOW E&M-EST. PATIENT-LVL II: CPT | Mod: PBBFAC,,, | Performed by: PHYSICIAN ASSISTANT

## 2024-01-23 PROCEDURE — 99024 POSTOP FOLLOW-UP VISIT: CPT | Mod: S$GLB,,, | Performed by: PHYSICIAN ASSISTANT

## 2024-01-23 PROCEDURE — 73110 X-RAY EXAM OF WRIST: CPT | Mod: 26,RT,, | Performed by: RADIOLOGY

## 2024-01-23 NOTE — PROGRESS NOTES
Dr. Burkett is the supervising physician for this encounter/patient    Lu White presents for post-operative evaluation.  The patient is now 6.5 weeks s/p Right ORIF distal radius fracture and carpal tunnel release with Dr. Burkett on 12/8/23.  Overall the patient reports doing well.  She reports 4/10 pain off/on, denies any f/c/s. She is working with OT on ROM, does admit to carrying groceries.    PE:    AA&O x 4.  NAD  HEENT:  NCAT, sclera nonicteric  Lungs:  Respirations are equal and unlabored.  CV:  2+ bilateral upper and lower extremity pulses.  MSK: The wound is well healed.  Full fist today, wrist flexion/extension to 30 degrees.. SILT. DNVI.    IMAGING - reviewed with patient  Stable appearance of ORIF distal radius, no hardware complications    A/P: Status post above, doing well  1) Continue with OT for ROM, encouraged she hold on WB to allow for more bone healing. Protected weight bearing at home with the brace should be limited.  2) Scar massage discussed.  3) F/U 4 weeks, repeat xrays.  4) Call with any questions/concerns in the interim      Jamie Russo-DiGeorge PA-C

## 2024-01-28 LAB
OHS CV AF BURDEN PERCENT: < 1
OHS CV DC REMOTE DEVICE TYPE: NORMAL
OHS CV ICD SHOCK: NO
OHS CV RV PACING PERCENT: 1.2 %

## 2024-01-30 ENCOUNTER — CLINICAL SUPPORT (OUTPATIENT)
Dept: REHABILITATION | Facility: HOSPITAL | Age: 89
End: 2024-01-30
Payer: MEDICARE

## 2024-01-30 DIAGNOSIS — M25.531 ARTHRALGIA OF RIGHT WRIST: ICD-10-CM

## 2024-01-30 DIAGNOSIS — M25.60 STIFFNESS IN JOINT: Primary | ICD-10-CM

## 2024-01-30 PROCEDURE — 97140 MANUAL THERAPY 1/> REGIONS: CPT | Mod: PN | Performed by: OCCUPATIONAL THERAPIST

## 2024-01-30 PROCEDURE — 97530 THERAPEUTIC ACTIVITIES: CPT | Mod: PN | Performed by: OCCUPATIONAL THERAPIST

## 2024-01-30 PROCEDURE — 97018 PARAFFIN BATH THERAPY: CPT | Mod: PN,59 | Performed by: OCCUPATIONAL THERAPIST

## 2024-01-30 NOTE — PROGRESS NOTES
Occupational Therapy Treatment Note     Date: 1/30/2024  Name: Lu White  Clinic Number: [id number]    Therapy Diagnosis:   Encounter Diagnoses   Name Primary?    Stiffness in joint Yes    Arthralgia of right wrist      Physician: Russo-Digeorge, Jamie L*    Physician Orders: Eval and Treat  Medical Diagnosis: ORIF DRF with CTR  Surgical Procedure and Date: ORIF with CTR of R arm , 12/8/23  Date of Injury: 11/26/23  Evaluation Date: 12/28/2023  Insurance Authorization Period Expiration: 12/31/23  Plan of Care Certification Period: 2/16/24  Date of Return to MD: 1/23/24  Visit # / Visits authorized: 6 / 20  FOTO: 1/3 (52% limitation)    Time In: 11:25 am  Time Out: 12:05 pm  Total Billable Time: 40 minutes    Precautions:  Standard and Weightbearing      Subjective     Pt reports: she saw PA last week and can begin strengthening next week  she was compliant with home exercise program given last session.   Response to previous treatment: decrease pain and increased ROM    Pain: 0/10  Location: right wrist     OBJECTIVE     Observation/Appearance: Noted swelling and edema in the wrist, ulnar aspect of pain.      Edema. Measured in centimeters.    12/28/2023 12/28/2023 1/16/24       Left Right  Right     Wrist Crease 16 18.5 17.7     Distal Palmar Crease 18 19.2 17.9           Elbow and Wrist ROM. Measured in degrees.    12/28/2023 12/28/2023 1/16/24       Left Right  Right     Elbow Ext/Flex           Supination/Pronation wnl 50/WNL  60/WNL     Wrist Ext/Flex 61/71 41/42 45/55     Wrist RD/UD 12/40 8/21  15/30           Strength (Dynamometer) and Pinch Strength (Pinch Gauge)  Measured in pounds.    12/28/2023 12/28/2023         Left Right       Rung II           Key Pinch           3pt Pinch               Treatment     Lu received the following supervised modalities after being cleared for contradictions for 10 minutes:   -Paraffin to decrease pain and stiffness and increase tissue elasticity    Lu  received the following manual therapy techniques for 10 minutes:   -IASTM and scar massage    Lu received therapeutic activities for 20 minutes including:  -TGEs  -Wrist AROM  -Wrist maze x 3 min  -Wrist wheel 2 ways x 2 min each  -Wrist 3 ways with dowel x 10  -[place] translation and manipulation on peg board  -Rubberbands around jar x 10  -Yellow theraputty marble pull, , and dowel press x 3 min    Home Exercises and Education Provided     Education provided:   - diagnosis, precautions and progression of treatment  - Progress towards goals     Written Home Exercises Provided: Patient instructed to cont prior HEP.  Exercises were reviewed and Lu was able to demonstrate them prior to the end of the session.  Lu demonstrated good  understanding of the HEP provided.   .   See EMR under Patient Instructions for exercises provided prior visit.        Assessment     Pt would continue to benefit from skilled OT services. Pt performed all exercises without difficulty. Plan to progress as tolerated.     Lu is progressing well towards her goals and there are no updates to goals at this time. Pt prognosis is Excellent.     Pt will continue to benefit from skilled outpatient occupational therapy to address the deficits listed in the problem list on initial evaluation provide pt/family education and to maximize pt's level of independence in the home and community environment.     Anticipated barriers to occupational therapy: none    Pt's spiritual, cultural and educational needs considered and pt agreeable to plan of care and goals.    Goals:  The following goals were discussed with the patient and patient is in agreement with them as to be addressed in the treatment plan.   Short term Goals:  1) Initiate HEP Met  2) Pt will increase AROM of L hand and wrist by 5-10 degrees in order to assist with carrying moving and handling by 4 weeks. Met  3) Pt will reduce edema by .5-1 cm in affected R wrist by 4 weeks.  Met  4) Pt will reduce pain to less than 4/10 by 4 weeks. Progressing 1/30/2024  5) Pt will increase functional  strength by 5# in order to A in opening containers for med management or home management tasks by 4 weeks.  Progressing 1/30/2024  6) Patient will be able to achieve less than or equal to 50% on the FOTO, demonstrating overall improved functional ability with upper extremity. (Self-care category) Progressing 1/30/2024     Long Term Goals:  Goals to be met by discharge:  1) Independent with HEP Progressing 1/30/2024  2) Pt will increase R hand CHAMBERLAIN by 25% degrees in order to increase functional use for grasp with home management or work related tasks by d/c.  Progressing 1/30/2024  3) Pt will decrease edema to trace or none to increase functional ROM by d/c.  Progressing 1/30/2024  4) Pt will decrease pain to trace or none while completing light home management tasks or work related tasks by d/c. Progressing 1/30/2024  5) Patient will be able to achieve less than or equal to 25% on the FOTO, demonstrating overall improved functional ability with upper extremity.  (Self-care category) Progressing 1/30/2024    Plan   Continue with POC  Updates/Grading for next session: progress as tolerated      Margaret Boasberg, OT

## 2024-02-01 ENCOUNTER — CLINICAL SUPPORT (OUTPATIENT)
Dept: REHABILITATION | Facility: HOSPITAL | Age: 89
End: 2024-02-01
Payer: MEDICARE

## 2024-02-01 DIAGNOSIS — M25.60 STIFFNESS IN JOINT: Primary | ICD-10-CM

## 2024-02-01 DIAGNOSIS — M25.531 ARTHRALGIA OF RIGHT WRIST: ICD-10-CM

## 2024-02-01 PROCEDURE — 97530 THERAPEUTIC ACTIVITIES: CPT | Mod: PN | Performed by: OCCUPATIONAL THERAPIST

## 2024-02-01 PROCEDURE — 97140 MANUAL THERAPY 1/> REGIONS: CPT | Mod: PN | Performed by: OCCUPATIONAL THERAPIST

## 2024-02-01 PROCEDURE — 97018 PARAFFIN BATH THERAPY: CPT | Mod: PN,59 | Performed by: OCCUPATIONAL THERAPIST

## 2024-02-01 NOTE — PROGRESS NOTES
Occupational Therapy Treatment Note     Date: 2/1/2024  Name: Lu White  Clinic Number: [id number]    Therapy Diagnosis:   Encounter Diagnoses   Name Primary?    Stiffness in joint Yes    Arthralgia of right wrist      Physician: Russo-Digeorge, Jamie L*    Physician Orders: Eval and Treat  Medical Diagnosis: ORIF DRF with CTR  Surgical Procedure and Date: ORIF with CTR of R arm , 12/8/23  Date of Injury: 11/26/23  Evaluation Date: 12/28/2023  Insurance Authorization Period Expiration: 12/31/23  Plan of Care Certification Period: 2/16/24  Date of Return to MD: 1/23/24  Visit # / Visits authorized: 7 / 20  FOTO: 1/3 (52% limitation)    Time In: 11:30 am  Time Out: 12:15 pm  Total Billable Time: 45 minutes    Precautions:  Standard and Weightbearing      Subjective     Pt reports: no new c/o  she was compliant with home exercise program given last session.   Response to previous treatment: decrease pain and increased ROM    Pain: 0/10  Location: right wrist     OBJECTIVE     Observation/Appearance: Noted swelling and edema in the wrist, ulnar aspect of pain.      Edema. Measured in centimeters.    12/28/2023 12/28/2023 1/16/24 2/1/24      Left Right  Right  Right   Wrist Crease 16 18.5 17.7  17.6   Distal Palmar Crease 18 19.2 17.9  17.9         Elbow and Wrist ROM. Measured in degrees.    12/28/2023 12/28/2023 1/16/24 2/1/24      Left Right  Right  Right   Elbow Ext/Flex           Supination/Pronation wnl 50/WNL  60/WNL  70/WNL   Wrist Ext/Flex 61/71 41/42 45/55  50/55   Wrist RD/UD 12/40 8/21  15/30  15/30         Strength (Dynamometer) and Pinch Strength (Pinch Gauge)  Measured in pounds.    12/28/2023 12/28/2023         Left Right       Rung II           Key Pinch           3pt Pinch               Treatment     Lu received the following supervised modalities after being cleared for contradictions for 10 minutes:   -Paraffin to decrease pain and stiffness and increase tissue elasticity    Lu  received the following manual therapy techniques for 10 minutes:   -IASTM and scar massage    Lu received therapeutic activities for 20 minutes including:  -TGEs  -Wrist AROM  -Wrist maze x 3 min  -Wrist wheel 2 ways x 2 min each  -Wrist 3 ways with dowel x 10  -[place] translation and manipulation on peg board  -Rubberbands around jar x 10  -Yellow theraputty marble pull, , and dowel press x 3 min    Home Exercises and Education Provided     Education provided:   - diagnosis, precautions and progression of treatment  - Progress towards goals     Written Home Exercises Provided: Patient instructed to cont prior HEP.  Exercises were reviewed and Lu was able to demonstrate them prior to the end of the session.  Lu demonstrated good  understanding of the HEP provided.   .   See EMR under Patient Instructions for exercises provided prior visit.        Assessment     Pt would continue to benefit from skilled OT services. Pt performed all exercises without difficulty. Plan to progress as tolerated.     Lu is progressing well towards her goals and there are no updates to goals at this time. Pt prognosis is Excellent.     Pt will continue to benefit from skilled outpatient occupational therapy to address the deficits listed in the problem list on initial evaluation provide pt/family education and to maximize pt's level of independence in the home and community environment.     Anticipated barriers to occupational therapy: none    Pt's spiritual, cultural and educational needs considered and pt agreeable to plan of care and goals.    Goals:  The following goals were discussed with the patient and patient is in agreement with them as to be addressed in the treatment plan.   Short term Goals:  1) Initiate HEP Met  2) Pt will increase AROM of L hand and wrist by 5-10 degrees in order to assist with carrying moving and handling by 4 weeks. Met  3) Pt will reduce edema by .5-1 cm in affected R wrist by 4 weeks.  Met  4) Pt will reduce pain to less than 4/10 by 4 weeks. Progressing 2/1/2024  5) Pt will increase functional  strength by 5# in order to A in opening containers for med management or home management tasks by 4 weeks.  Progressing 2/1/2024  6) Patient will be able to achieve less than or equal to 50% on the FOTO, demonstrating overall improved functional ability with upper extremity. (Self-care category) Progressing 2/1/2024     Long Term Goals:  Goals to be met by discharge:  1) Independent with HEP Progressing 2/1/2024  2) Pt will increase R hand CHAMBERLAIN by 25% degrees in order to increase functional use for grasp with home management or work related tasks by d/c.  Progressing 2/1/2024  3) Pt will decrease edema to trace or none to increase functional ROM by d/c.  Progressing 2/1/2024  4) Pt will decrease pain to trace or none while completing light home management tasks or work related tasks by d/c. Progressing 2/1/2024  5) Patient will be able to achieve less than or equal to 25% on the FOTO, demonstrating overall improved functional ability with upper extremity.  (Self-care category) Progressing 2/1/2024    Plan   Continue with POC  Updates/Grading for next session: progress as tolerated      Margaret Boasberg, OT

## 2024-02-06 ENCOUNTER — CLINICAL SUPPORT (OUTPATIENT)
Dept: REHABILITATION | Facility: HOSPITAL | Age: 89
End: 2024-02-06
Payer: MEDICARE

## 2024-02-06 DIAGNOSIS — M25.60 STIFFNESS IN JOINT: Primary | ICD-10-CM

## 2024-02-06 DIAGNOSIS — M25.531 ARTHRALGIA OF RIGHT WRIST: ICD-10-CM

## 2024-02-06 PROCEDURE — 97530 THERAPEUTIC ACTIVITIES: CPT | Mod: PN | Performed by: OCCUPATIONAL THERAPIST

## 2024-02-06 PROCEDURE — 97018 PARAFFIN BATH THERAPY: CPT | Mod: PN | Performed by: OCCUPATIONAL THERAPIST

## 2024-02-06 PROCEDURE — 97140 MANUAL THERAPY 1/> REGIONS: CPT | Mod: PN | Performed by: OCCUPATIONAL THERAPIST

## 2024-02-06 NOTE — PROGRESS NOTES
Occupational Therapy Treatment Note     Date: 2/6/2024  Name: Lu hWite  Clinic Number: [id number]    Therapy Diagnosis:   Encounter Diagnoses   Name Primary?    Stiffness in joint Yes    Arthralgia of right wrist      Physician: Russo-Digeorge, Jamie L*    Physician Orders: Eval and Treat  Medical Diagnosis: ORIF DRF with CTR  Surgical Procedure and Date: ORIF with CTR of R arm , 12/8/23  Date of Injury: 11/26/23  Evaluation Date: 12/28/2023  Insurance Authorization Period Expiration: 12/31/23  Plan of Care Certification Period: 2/16/24  Date of Return to MD: 1/23/24  Visit # / Visits authorized: 8 / 20  FOTO: 1/3 (52% limitation)    Time In: 11:30 am  Time Out: 12:10 pm  Total Billable Time: 40 minutes    Precautions:  Standard and Weightbearing      Subjective     Pt reports: no new c/o   she was compliant with home exercise program given last session.   Response to previous treatment: decrease pain and increased ROM    Pain: 0/10  Location: right wrist     OBJECTIVE     Observation/Appearance: Noted swelling and edema in the wrist, ulnar aspect of pain.      Edema. Measured in centimeters.    12/28/2023 12/28/2023 1/16/24 2/1/24      Left Right  Right  Right   Wrist Crease 16 18.5 17.7  17.6   Distal Palmar Crease 18 19.2 17.9  17.9         Elbow and Wrist ROM. Measured in degrees.    12/28/2023 12/28/2023 1/16/24 2/1/24      Left Right  Right  Right   Elbow Ext/Flex           Supination/Pronation wnl 50/WNL  60/WNL  70/WNL   Wrist Ext/Flex 61/71 41/42 45/55  50/55   Wrist RD/UD 12/40 8/21  15/30  15/30         Strength (Dynamometer) and Pinch Strength (Pinch Gauge)  Measured in pounds.    12/28/2023 12/28/2023         Left Right       Rung II           Key Pinch           3pt Pinch               Treatment     Lu received the following supervised modalities after being cleared for contradictions for 10 minutes:   -Paraffin to decrease pain and stiffness and increase tissue elasticity    Lu  received the following manual therapy techniques for 10 minutes:   -IASTM and scar massage    Lu received therapeutic activities for 20 minutes including:  -TGEs  -Wrist AROM  -Wrist maze x 3 min  -Wrist wheel 2 ways x 2 min each  -Wrist 3 ways with dowel x 10  -[place] translation and manipulation on peg board  -Rubberbands around jar x 10  -Yellow theraputty marble pull, , and dowel press x 3 min    Home Exercises and Education Provided     Education provided:   - diagnosis, precautions and progression of treatment  - Progress towards goals     Written Home Exercises Provided: Patient instructed to cont prior HEP.  Exercises were reviewed and Lu was able to demonstrate them prior to the end of the session.  Lu demonstrated good  understanding of the HEP provided.   .   See EMR under Patient Instructions for exercises provided prior visit.        Assessment     Pt would continue to benefit from skilled OT services. Pt performed all exercises without difficulty. Plan to progress as tolerated.     Lu is progressing well towards her goals and there are no updates to goals at this time. Pt prognosis is Excellent.     Pt will continue to benefit from skilled outpatient occupational therapy to address the deficits listed in the problem list on initial evaluation provide pt/family education and to maximize pt's level of independence in the home and community environment.     Anticipated barriers to occupational therapy: none    Pt's spiritual, cultural and educational needs considered and pt agreeable to plan of care and goals.    Goals:  The following goals were discussed with the patient and patient is in agreement with them as to be addressed in the treatment plan.   Short term Goals:  1) Initiate HEP Met  2) Pt will increase AROM of L hand and wrist by 5-10 degrees in order to assist with carrying moving and handling by 4 weeks. Met  3) Pt will reduce edema by .5-1 cm in affected R wrist by 4 weeks.  Met  4) Pt will reduce pain to less than 4/10 by 4 weeks. Progressing 2/6/2024  5) Pt will increase functional  strength by 5# in order to A in opening containers for med management or home management tasks by 4 weeks.  Progressing 2/6/2024  6) Patient will be able to achieve less than or equal to 50% on the FOTO, demonstrating overall improved functional ability with upper extremity. (Self-care category) Progressing 2/6/2024     Long Term Goals:  Goals to be met by discharge:  1) Independent with HEP Progressing 2/6/2024  2) Pt will increase R hand CHAMBERLAIN by 25% degrees in order to increase functional use for grasp with home management or work related tasks by d/c.  Progressing 2/6/2024  3) Pt will decrease edema to trace or none to increase functional ROM by d/c.  Progressing 2/6/2024  4) Pt will decrease pain to trace or none while completing light home management tasks or work related tasks by d/c. Progressing 2/6/2024  5) Patient will be able to achieve less than or equal to 25% on the FOTO, demonstrating overall improved functional ability with upper extremity.  (Self-care category) Progressing 2/6/2024    Plan   Continue with POC  Updates/Grading for next session: progress as tolerated      Margaret Boasberg, OT

## 2024-02-14 NOTE — PROGRESS NOTES
"  Occupational Therapy Treatment Note     Date: 2/15/2024  Name: Lu White  Clinic Number: [id number]    Therapy Diagnosis:   Encounter Diagnoses   Name Primary?    Stiffness in joint Yes    Arthralgia of right wrist      Physician: Russo-Digeorge, Jamie L*    Physician Orders: Eval and Treat  Medical Diagnosis: ORIF DRF with CTR  Surgical Procedure and Date: ORIF with CTR of R arm , 12/8/23  Date of Injury: 11/26/23  Evaluation Date: 12/28/2023  Insurance Authorization Period Expiration: 12/31/23  Plan of Care Certification Period: 2/16/24  Date of Return to MD: 1/23/24  Visit # / Visits authorized: 9 / 20  FOTO: 1/3 (52% limitation)    Time In: 11:45 am  Time Out: 12:25 pm  Total Billable Time: 40 minutes    Precautions:  Standard and Weightbearing      Subjective     Pt reports: "I'm hoping that when I go to the doctor next week, they tell me I can be done."  she was compliant with home exercise program given last session.   Response to previous treatment: decrease pain and increased ROM    Pain: 0/10  Location: right wrist     OBJECTIVE     Observation/Appearance: Noted swelling and edema in the wrist, ulnar aspect of pain.      Edema. Measured in centimeters.    12/28/2023 12/28/2023 1/16/24 2/1/24      Left Right  Right  Right   Wrist Crease 16 18.5 17.7  17.6   Distal Palmar Crease 18 19.2 17.9  17.9         Elbow and Wrist ROM. Measured in degrees.    12/28/2023 12/28/2023 1/16/24 2/1/24      Left Right  Right  Right   Elbow Ext/Flex           Supination/Pronation wnl 50/WNL  60/WNL  70/WNL   Wrist Ext/Flex 61/71 41/42 45/55  50/55   Wrist RD/UD 12/40 8/21  15/30  15/30         Strength (Dynamometer) and Pinch Strength (Pinch Gauge)  Measured in pounds.    12/28/2023 12/28/2023         Left Right       Rung II           Key Pinch           3pt Pinch               Treatment     Lu received the following supervised modalities after being cleared for contradictions for 10 minutes:   -Paraffin to " decrease pain and stiffness and increase tissue elasticity    Lu received the following manual therapy techniques for 10 minutes:   -IASTM and scar massage    Lu received therapeutic activities for 30 minutes including:  -TGEs  -Wrist AROM  -Wrist maze x 3 min  -true balance x 2 min   -Wrist wheel 2 ways x 2 min each  -Wrist 3 ways with dowel x 10  -[place] translation and manipulation on peg board  -Rubberbands around jar x 10  -Yellow theraputty marble pull, , and dowel press x 3 min  - yellow tweb    and light WB    Home Exercises and Education Provided     Education provided:   - diagnosis, precautions and progression of treatment  - Progress towards goals     Written Home Exercises Provided: Patient instructed to cont prior HEP.  Exercises were reviewed and Lu was able to demonstrate them prior to the end of the session.  Lu demonstrated good  understanding of the HEP provided.   .   See EMR under Patient Instructions for exercises provided prior visit.        Assessment   Patient is now 9w6d p/o. She states that she returns to the MD next week to discuss current status and POC. She is hoping to be d/c with therapy following the MD appt as long as everything is healing appropriately. He daughter is about to have a THR and she wants to be able to help as needed. We are still holding off on adding any additional resistance until she receives her next xrays. Next tx session, I suggest proving a transitional HEP in hopes that the MD d/c her from therapy. Patient tolerated all established activities with no pain or difficulty noted. Continue skilled OT POC and progress per protocol as tolerated.     Pt would continue to benefit from skilled OT services. Pt performed all exercises without difficulty. Plan to progress as tolerated.     Lu is progressing well towards her goals and there are no updates to goals at this time. Pt prognosis is Excellent.     Pt will continue to benefit from  skilled outpatient occupational therapy to address the deficits listed in the problem list on initial evaluation provide pt/family education and to maximize pt's level of independence in the home and community environment.     Anticipated barriers to occupational therapy: none    Pt's spiritual, cultural and educational needs considered and pt agreeable to plan of care and goals.    Goals:  The following goals were discussed with the patient and patient is in agreement with them as to be addressed in the treatment plan.   Short term Goals:  1) Initiate HEP Met  2) Pt will increase AROM of L hand and wrist by 5-10 degrees in order to assist with carrying moving and handling by 4 weeks. Met  3) Pt will reduce edema by .5-1 cm in affected R wrist by 4 weeks. Met  4) Pt will reduce pain to less than 4/10 by 4 weeks. Met, 2/15/2024  5) Pt will increase functional  strength by 5# in order to A in opening containers for med management or home management tasks by 4 weeks.  Not initiated 2/15/2024  6) Patient will be able to achieve less than or equal to 50% on the FOTO, demonstrating overall improved functional ability with upper extremity. (Self-care category) Progressing 2/15/2024     Long Term Goals:  Goals to be met by discharge:  1) Independent with HEP Progressing 2/15/2024  2) Pt will increase R hand CHAMBERLAIN by 25% degrees in order to increase functional use for grasp with home management or work related tasks by d/c.  Progressing 2/15/2024  3) Pt will decrease edema to trace or none to increase functional ROM by d/c.  Progressing 2/15/2024  4) Pt will decrease pain to trace or none while completing light home management tasks or work related tasks by d/c. Progressing 2/15/2024  5) Patient will be able to achieve less than or equal to 25% on the FOTO, demonstrating overall improved functional ability with upper extremity.  (Self-care category) Progressing 2/15/2024    Plan   Continue with POC  Updates/Grading for  next session: progress as tolerated      Meredith Garcia, OT

## 2024-02-15 ENCOUNTER — CLINICAL SUPPORT (OUTPATIENT)
Dept: REHABILITATION | Facility: HOSPITAL | Age: 89
End: 2024-02-15
Payer: MEDICARE

## 2024-02-15 DIAGNOSIS — M25.60 STIFFNESS IN JOINT: Primary | ICD-10-CM

## 2024-02-15 DIAGNOSIS — M25.531 ARTHRALGIA OF RIGHT WRIST: ICD-10-CM

## 2024-02-15 PROCEDURE — 97140 MANUAL THERAPY 1/> REGIONS: CPT | Mod: PN

## 2024-02-15 PROCEDURE — 97018 PARAFFIN BATH THERAPY: CPT | Mod: PN

## 2024-02-15 PROCEDURE — 97530 THERAPEUTIC ACTIVITIES: CPT | Mod: PN

## 2024-02-19 NOTE — PROGRESS NOTES
"    Occupational Therapy Treatment Note     Date: 2/20/2024  Name: Lu White  Clinic Number: [id number]    Therapy Diagnosis:   Encounter Diagnoses   Name Primary?    Stiffness in joint Yes    Arthralgia of right wrist      Physician: Russo-Digeorge, Jamie L*    Physician Orders: Eval and Treat  Medical Diagnosis: ORIF DRF with CTR  Surgical Procedure and Date: ORIF with CTR of R arm , 12/8/23  Date of Injury: 11/26/23  Evaluation Date: 12/28/2023  Insurance Authorization Period Expiration: 12/31/23  Plan of Care Certification Period: 2/16/24  Date of Return to MD: 1/23/24  Visit # / Visits authorized: 10 / 20  FOTO: 2/3 (49% limitation)     Time In: 11:30 am  Time Out: 12:15 pm  Total Billable Time: 45 minutes    Precautions:  Standard and Weightbearing      Subjective     Pt reports: "I go to the doctor on Friday and I'm hoping she will tell me that I can be done so I can help my daughter."  she was compliant with home exercise program given last session.   Response to previous treatment: decrease pain and increased ROM    Pain: 0/10  Location: right wrist     OBJECTIVE     Observation/Appearance: Noted swelling and edema in the wrist, ulnar aspect of pain.      Edema. Measured in centimeters.    12/28/2023 12/28/2023 1/16/24 2/1/24 2/20/2024     Left Right  Right  Right Right   Wrist Crease 16 18.5 17.7  17.6 17.3   Distal Palmar Crease 18 19.2 17.9  17.9 17.9         Elbow and Wrist ROM. Measured in degrees.    12/28/2023 12/28/2023 1/16/24 2/1/24 2/20/2024     Left Right  Right  Right Right   Elbow Ext/Flex            Supination/Pronation wnl 50/WNL  60/WNL  70/WNL 81/WNL   Wrist Ext/Flex 61/71 41/42 45/55  50/55 58/60   Wrist RD/UD 12/40 8/21  15/30  15/30 15/45         Strength (Dynamometer) and Pinch Strength (Pinch Gauge)  Measured in pounds.    12/28/2023 12/28/2023         Left Right       Rung II           Key Pinch           3pt Pinch               Treatment     Lu received the following " supervised modalities after being cleared for contradictions for 10 minutes:   -Paraffin to decrease pain and stiffness and increase tissue elasticity    Lu received the following manual therapy techniques for 10 minutes:   -IASTM and scar massage    Lu received therapeutic activities for 30 minutes including:  -TGEs  -Wrist AROM  -Wrist maze x 3 min  -true balance x 2 min   -Wrist wheel 2 ways x 2 min each  -Wrist 3 ways with dowel x 10  -[place] translation and manipulation on peg board  -Rubberbands around jar x 10  -Yellow theraputty marble pull, , and dowel press x 3 min  - yellow tweb    and light WB    Home Exercises and Education Provided     Education provided:   - diagnosis, precautions and progression of treatment  - Progress towards goals     Written Home Exercises Provided: Patient instructed to cont prior HEP.  Exercises were reviewed and Lu was able to demonstrate them prior to the end of the session.  Lu demonstrated good  understanding of the HEP provided.   .   See EMR under Patient Instructions for exercises provided prior visit.        Assessment   Patient is now 10w4d p/o. Per assessment, she is doing well and has made improvements with ROM and edema reduction. We still did not test strength while allowing the bones to heal completely until her next xrays. She returns to the MD Friday to discuss current status and POC. She is hoping to be d/c with therapy following the MD appt as long as everything is healing appropriately. He daughter is about to have a THR and she wants to be able to help as needed. I provided patient with a transitional strengthening HEP to begin if the MD permits. If the MD would like her to continue therapy, she will call to return to the schedule. Patient tolerated all established activities with no pain or difficulty noted. Continue skilled OT POC and progress per protocol as tolerated.     Pt would continue to benefit from skilled OT services. Pt  performed all exercises without difficulty. Plan to progress as tolerated.     Lu is progressing well towards her goals and there are no updates to goals at this time. Pt prognosis is Excellent.     Pt will continue to benefit from skilled outpatient occupational therapy to address the deficits listed in the problem list on initial evaluation provide pt/family education and to maximize pt's level of independence in the home and community environment.     Anticipated barriers to occupational therapy: none    Pt's spiritual, cultural and educational needs considered and pt agreeable to plan of care and goals.    Goals:  The following goals were discussed with the patient and patient is in agreement with them as to be addressed in the treatment plan.   Short term Goals:  1) Initiate HEP Met  2) Pt will increase AROM of L hand and wrist by 5-10 degrees in order to assist with carrying moving and handling by 4 weeks. Met  3) Pt will reduce edema by .5-1 cm in affected R wrist by 4 weeks. Met  4) Pt will reduce pain to less than 4/10 by 4 weeks. Met, 2/20/2024  5) Pt will increase functional  strength by 5# in order to A in opening containers for med management or home management tasks by 4 weeks.  Not initiated 2/20/2024  6) Patient will be able to achieve less than or equal to 50% on the FOTO, demonstrating overall improved functional ability with upper extremity. (Self-care category) Met, 2/20/2024     Long Term Goals:  Goals to be met by discharge:  1) Independent with HEP Met, 2/20/2024  2) Pt will increase R hand CHAMBERLAIN by 25 degrees in order to increase functional use for grasp with home management or work related tasks by d/c.  Met, 2/20/2024  3) Pt will decrease edema to trace or none to increase functional ROM by d/c. Met, 2/20/2024  4) Pt will decrease pain to trace or none while completing light home management tasks or work related tasks by d/c. Met, 2/20/2024  5) Patient will be able to achieve less  than or equal to 25% on the FOTO, demonstrating overall improved functional ability with upper extremity.  (Self-care category) Not met 2/20/2024     Plan   MD appt on 2/23/2024  Patient will call to return to schedule if MD would like her to return to therapy    Meredith Garcia, OT

## 2024-02-20 ENCOUNTER — CLINICAL SUPPORT (OUTPATIENT)
Dept: REHABILITATION | Facility: HOSPITAL | Age: 89
End: 2024-02-20
Payer: MEDICARE

## 2024-02-20 DIAGNOSIS — M25.60 STIFFNESS IN JOINT: Primary | ICD-10-CM

## 2024-02-20 DIAGNOSIS — M25.531 ARTHRALGIA OF RIGHT WRIST: ICD-10-CM

## 2024-02-20 PROCEDURE — 97110 THERAPEUTIC EXERCISES: CPT | Mod: PN

## 2024-02-20 PROCEDURE — 97530 THERAPEUTIC ACTIVITIES: CPT | Mod: PN

## 2024-02-20 PROCEDURE — 97140 MANUAL THERAPY 1/> REGIONS: CPT | Mod: PN

## 2024-02-20 PROCEDURE — 97018 PARAFFIN BATH THERAPY: CPT | Mod: PN,59

## 2024-02-22 ENCOUNTER — PATIENT MESSAGE (OUTPATIENT)
Dept: ORTHOPEDICS | Facility: CLINIC | Age: 89
End: 2024-02-22
Payer: MEDICARE

## 2024-02-22 DIAGNOSIS — M25.531 RIGHT WRIST PAIN: Primary | ICD-10-CM

## 2024-02-23 ENCOUNTER — OFFICE VISIT (OUTPATIENT)
Dept: ORTHOPEDICS | Facility: CLINIC | Age: 89
End: 2024-02-23
Payer: MEDICARE

## 2024-02-23 ENCOUNTER — HOSPITAL ENCOUNTER (OUTPATIENT)
Dept: RADIOLOGY | Facility: HOSPITAL | Age: 89
Discharge: HOME | End: 2024-02-23
Attending: PHYSICIAN ASSISTANT
Payer: MEDICARE

## 2024-02-23 VITALS — WEIGHT: 140 LBS | BODY MASS INDEX: 23.9 KG/M2 | HEIGHT: 64 IN

## 2024-02-23 DIAGNOSIS — Z98.890 POSTOPERATIVE STATE: ICD-10-CM

## 2024-02-23 DIAGNOSIS — S52.501A CLOSED FRACTURE OF DISTAL END OF RIGHT RADIUS, UNSPECIFIED FRACTURE MORPHOLOGY, INITIAL ENCOUNTER: Primary | ICD-10-CM

## 2024-02-23 DIAGNOSIS — M25.531 RIGHT WRIST PAIN: ICD-10-CM

## 2024-02-23 DIAGNOSIS — G56.01 CARPAL TUNNEL SYNDROME ON RIGHT: ICD-10-CM

## 2024-02-23 PROCEDURE — 73110 X-RAY EXAM OF WRIST: CPT | Mod: 26,RT,, | Performed by: RADIOLOGY

## 2024-02-23 PROCEDURE — 99024 POSTOP FOLLOW-UP VISIT: CPT | Mod: S$GLB,,, | Performed by: PHYSICIAN ASSISTANT

## 2024-02-23 PROCEDURE — 99999 PR PBB SHADOW E&M-EST. PATIENT-LVL III: CPT | Mod: PBBFAC,,, | Performed by: PHYSICIAN ASSISTANT

## 2024-02-23 PROCEDURE — 73110 X-RAY EXAM OF WRIST: CPT | Mod: TC,RT

## 2024-02-23 NOTE — PROGRESS NOTES
Dr. Burkett is the supervising physician for this encounter/patient    Lu White presents for post-operative evaluation.  The patient is now 11 weeks s/p Right ORIF distal radius fracture and carpal tunnel release with Dr. Burkett on 12/8/23.  Overall the patient reports doing well.  She reports 0/10 pain off/on, denies any f/c/s. She is working with OT on ROM, as not started strengthening program.    PE:    AA&O x 4.  NAD  HEENT:  NCAT, sclera nonicteric  Lungs:  Respirations are equal and unlabored.  CV:  2+ bilateral upper and lower extremity pulses.  MSK: The wound is well healed.  There are two small raised lesions noted proximal to the surgical site which is new. Full fist today, wrist flexion/extension to 30 degrees.. SILT. DNVI.    IMAGING - reviewed with patient  FINDINGS:  Findings seen and described on earlier exam stable.  Osteopenia.  No acute fractures.  Reconfirmed subacute internally fixated distal radial fracture with plate and screw construct, no detrimental changes in the appearance of fracture site, position alignment of fracture fragments, or fixating hardware.  Fracture line remains evident.  Reconfirmed subacute comminuted distal ulnar fracture, no detrimental changes in the appearance of fracture sites or position alignment of fracture fragments.  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  Some stable degenerative spurring at the 1st carpal metacarpal articulation.  Reconfirm soft tissue swelling dorsally about the wrist and at the MCP articulations based on lateral exam.     Impression:     As above    A/P: Status post above, doing well  1) Continue with OT for strengthening program as tolerated.  2) I advise her to have her PCM look at the raised lesions as this is not consistent with any vicryl suture and is too proximal to be related to the surgical incision.  3) F/U as needed.  4) Call with any questions/concerns in the interim      Luis Alberto  Russo-DiGeorge PA-C

## 2024-02-26 ENCOUNTER — OFFICE VISIT (OUTPATIENT)
Dept: DERMATOLOGY | Facility: CLINIC | Age: 89
End: 2024-02-26
Payer: MEDICARE

## 2024-02-26 DIAGNOSIS — L57.0 ACTINIC KERATOSES: Primary | ICD-10-CM

## 2024-02-26 PROCEDURE — 17000 DESTRUCT PREMALG LESION: CPT | Mod: S$GLB,,, | Performed by: STUDENT IN AN ORGANIZED HEALTH CARE EDUCATION/TRAINING PROGRAM

## 2024-02-26 PROCEDURE — 99999 PR PBB SHADOW E&M-EST. PATIENT-LVL II: CPT | Mod: PBBFAC,,, | Performed by: STUDENT IN AN ORGANIZED HEALTH CARE EDUCATION/TRAINING PROGRAM

## 2024-02-26 PROCEDURE — 99202 OFFICE O/P NEW SF 15 MIN: CPT | Mod: 25,S$GLB,, | Performed by: STUDENT IN AN ORGANIZED HEALTH CARE EDUCATION/TRAINING PROGRAM

## 2024-02-26 PROCEDURE — 17003 DESTRUCT PREMALG LES 2-14: CPT | Mod: S$GLB,,, | Performed by: STUDENT IN AN ORGANIZED HEALTH CARE EDUCATION/TRAINING PROGRAM

## 2024-02-26 NOTE — PROGRESS NOTES
Subjective:      Patient ID:  Lu White is a 89 y.o. female who presents for   Chief Complaint   Patient presents with    Lesion     R forearm     Lu White is a 89 y.o. female who presents for: evaluation of skin lesions.    New patient    The patient has the following lesions of concern:  Location: R forearm  Duration: 6 weeks - a few months  Symptoms: black spot  Relieving factors/Previous treatments: none  Had surgery nearby    Pertinent history:  History of blistering sunburns: No  History of tanning bed use: No  Family history of melanoma: No  Personal history of mole removal: Yes  Personal history of skin cancer: Unsure         Review of Systems   Skin:  Positive for activity-related sunscreen use. Negative for daily sunscreen use and recent sunburn.   Hematologic/Lymphatic: Bruises/bleeds easily (Aspirin).       Objective:   Physical Exam   Skin:   Areas Examined (abnormalities noted in diagram):   RUE Inspected            Diagram Legend     Erythematous scaling macule/papule c/w actinic keratosis       Vascular papule c/w angioma      Pigmented verrucoid papule/plaque c/w seborrheic keratosis      Yellow umbilicated papule c/w sebaceous hyperplasia      Irregularly shaped tan macule c/w lentigo     1-2 mm smooth white papules consistent with Milia      Movable subcutaneous cyst with punctum c/w epidermal inclusion cyst      Subcutaneous movable cyst c/w pilar cyst      Firm pink to brown papule c/w dermatofibroma      Pedunculated fleshy papule(s) c/w skin tag(s)      Evenly pigmented macule c/w junctional nevus     Mildly variegated pigmented, slightly irregular-bordered macule c/w mildly atypical nevus      Flesh colored to evenly pigmented papule c/w intradermal nevus       Pink pearly papule/plaque c/w basal cell carcinoma      Erythematous hyperkeratotic cursted plaque c/w SCC      Surgical scar with no sign of skin cancer recurrence      Open and closed comedones      Inflammatory  papules and pustules      Verrucoid papule consistent consistent with wart     Erythematous eczematous patches and plaques     Dystrophic onycholytic nail with subungual debris c/w onychomycosis     Umbilicated papule    Erythematous-base heme-crusted tan verrucoid plaque consistent with inflamed seborrheic keratosis     Erythematous Silvery Scaling Plaque c/w Psoriasis     See annotation      Assessment / Plan:        Actinic keratoses  Cryosurgery Procedure Note    Verbal consent from the patient is obtained including, but not limited to, risk of hypopigmentation/hyperpigmentation, scar, recurrence of lesion. The patient is aware of the precancerous quality and need for treatment of these lesions. Liquid nitrogen cryosurgery is applied to the 2 actinic keratoses, as detailed in the physical exam, to produce a freeze injury. The patient is aware that blisters may form and is instructed on wound care with gentle cleansing and use of vaseline ointment to keep moist until healed. The patient is supplied a handout on cryosurgery and is instructed to call if lesions do not completely resolve.    Prior to cryosurgery curette crusting of left forearm, came off easily recommend apply vaseline    RTC prn if condition worsens or fails to improve

## 2024-02-29 ENCOUNTER — CLINICAL SUPPORT (OUTPATIENT)
Dept: REHABILITATION | Facility: HOSPITAL | Age: 89
End: 2024-02-29
Payer: MEDICARE

## 2024-02-29 DIAGNOSIS — M25.60 STIFFNESS IN JOINT: Primary | ICD-10-CM

## 2024-02-29 DIAGNOSIS — M25.531 ARTHRALGIA OF RIGHT WRIST: ICD-10-CM

## 2024-02-29 PROCEDURE — 97530 THERAPEUTIC ACTIVITIES: CPT | Mod: PN

## 2024-02-29 PROCEDURE — 97140 MANUAL THERAPY 1/> REGIONS: CPT | Mod: PN

## 2024-02-29 PROCEDURE — 97018 PARAFFIN BATH THERAPY: CPT | Mod: PN

## 2024-02-29 NOTE — PROGRESS NOTES
"    Occupational Therapy Treatment Note     Date: 2/29/2024  Name: Lu White  Clinic Number: [id number]    Therapy Diagnosis:   Encounter Diagnoses   Name Primary?    Stiffness in joint Yes    Arthralgia of right wrist      Physician: Russo-Digeorge, Jamie L*    Physician Orders: Eval and Treat  Medical Diagnosis: ORIF DRF with CTR  Surgical Procedure and Date: ORIF with CTR of R arm , 12/8/23  Date of Injury: 11/26/23  Evaluation Date: 12/28/2023  Insurance Authorization Period Expiration: 12/31/23  Plan of Care Certification Period: 2/16/24  Date of Return to MD: 1/23/24  Visit # / Visits authorized: 11 / 20  FOTO: 2/3 (49% limitation)     Time In: 1230 am  Time Out: 125 pm  Total Billable Time: 55 minutes    Precautions:  Standard and Weightbearing      Subjective     Pt reports: "I go to the doctor on Friday and I'm hoping she will tell me that I can be done so I can help my daughter."  she was compliant with home exercise program given last session.   Response to previous treatment: decrease pain and increased ROM    Pain: 0/10  Location: right wrist     OBJECTIVE     Observation/Appearance: Noted swelling and edema in the wrist, ulnar aspect of pain.      Edema. Measured in centimeters.    12/28/2023 12/28/2023 1/16/24 2/1/24 2/20/2024     Left Right  Right  Right Right   Wrist Crease 16 18.5 17.7  17.6 17.3   Distal Palmar Crease 18 19.2 17.9  17.9 17.9         Elbow and Wrist ROM. Measured in degrees.    12/28/2023 12/28/2023 1/16/24 2/1/24 2/20/2024     Left Right  Right  Right Right   Elbow Ext/Flex            Supination/Pronation wnl 50/WNL  60/WNL  70/WNL 81/WNL   Wrist Ext/Flex 61/71 41/42 45/55  50/55 58/60   Wrist RD/UD 12/40 8/21  15/30  15/30 15/45         Strength (Dynamometer) and Pinch Strength (Pinch Gauge)  Measured in pounds.    12/28/2023 12/28/2023         Left Right       Rung II           Key Pinch           3pt Pinch               Treatment     Lu received the following " supervised modalities after being cleared for contradictions for 10 minutes:   -Paraffin to decrease pain and stiffness and increase tissue elasticity    Lu received the following manual therapy techniques for 10 minutes:   -IASTM and scar massage    Lu received therapeutic activities for 35 minutes including:  -TGEs  -Prayer stretch 2 ways 3/30 sec to tolerance   -Wrist AROM  -Wrist maze x 3 min  -true balance x 2 min   -Wrist wheel 2 ways x 2 min each  -Wrist 3 ways with 1#l x 2/10  -[place] translation and manipulation on peg board  -Rubberbands around jar x 10  -Yellow theraputty marble pull, , and dowel press x 3 min each  -Pom pom  Red CP one bucket      Home Exercises and Education Provided     Education provided:   - diagnosis, precautions and progression of treatment  - Progress towards goals     Written Home Exercises Provided: Patient instructed to cont prior HEP.  Exercises were reviewed and Lu was able to demonstrate them prior to the end of the session.  Lu demonstrated good  understanding of the HEP provided.   .   See EMR under Patient Instructions for exercises provided prior visit.        Assessment   Patient doing well today. No complaints of pain. She states seeing MD who would like her to continue in order to strengthen the wrist and RUE. She did well with all added resistance and exercises today although requiring some rest breaks due to fatigue with prolonged  and pinch. She was provided strength program for the wrist last session and educated to continue this now at home since MD approved. Encouraged pain free range of motion due to delayed healing of ulna at this point. She states her daughter having surgery so she will call back to make follow up appt next week.       Pt would continue to benefit from skilled OT services. Pt performed all exercises without difficulty. Plan to progress as tolerated.     Lu is progressing well towards her goals and there are  no updates to goals at this time. Pt prognosis is Excellent.     Pt will continue to benefit from skilled outpatient occupational therapy to address the deficits listed in the problem list on initial evaluation provide pt/family education and to maximize pt's level of independence in the home and community environment.     Anticipated barriers to occupational therapy: none    Pt's spiritual, cultural and educational needs considered and pt agreeable to plan of care and goals.    Goals:  The following goals were discussed with the patient and patient is in agreement with them as to be addressed in the treatment plan.   Short term Goals:  1) Initiate HEP Met  2) Pt will increase AROM of L hand and wrist by 5-10 degrees in order to assist with carrying moving and handling by 4 weeks. Met  3) Pt will reduce edema by .5-1 cm in affected R wrist by 4 weeks. Met  4) Pt will reduce pain to less than 4/10 by 4 weeks. Met, 2/29/2024  5) Pt will increase functional  strength by 5# in order to A in opening containers for med management or home management tasks by 4 weeks.  Not initiated 2/29/2024  6) Patient will be able to achieve less than or equal to 50% on the FOTO, demonstrating overall improved functional ability with upper extremity. (Self-care category) Met, 2/29/2024     Long Term Goals:  Goals to be met by discharge:  1) Independent with HEP Met, 2/29/2024  2) Pt will increase R hand CHAMBERLAIN by 25 degrees in order to increase functional use for grasp with home management or work related tasks by d/c.  Met, 2/29/2024  3) Pt will decrease edema to trace or none to increase functional ROM by d/c. Met, 2/29/2024  4) Pt will decrease pain to trace or none while completing light home management tasks or work related tasks by d/c. Met, 2/29/2024  5) Patient will be able to achieve less than or equal to 25% on the FOTO, demonstrating overall improved functional ability with upper extremity.  (Self-care category) Not met  2/29/2024     Plan   MD appt on 2/23/2024  Patient will call to return to schedule if MD would like her to return to therapy    Andreas Meeks, OT

## 2024-03-07 NOTE — PROGRESS NOTES
"Occupational Therapy Discharge Note     Date: 3/8/2024  Name: Lu White  Clinic Number: [id number]    Therapy Diagnosis:   Encounter Diagnoses   Name Primary?    Stiffness in joint Yes    Arthralgia of right wrist        Physician: Russo-Digeorge, Jamie L*    Physician Orders: Eval and Treat  Medical Diagnosis: ORIF DRF with CTR  Surgical Procedure and Date: ORIF with CTR of R arm , 12/8/23  Date of Injury: 11/26/23  Evaluation Date: 12/28/2023  Insurance Authorization Period Expiration: 12/31/23  Plan of Care Certification Period: 2/16/24  Date of Return to MD: 1/23/24  Visit # / Visits authorized: 13 / 20  FOTO: 3/3 (33% limitation)     Time In: 10:00 am  Time Out: 10:35 am  Total Billable Time: 35 minutes    Precautions:  Standard and Weightbearing      Subjective     Pt reports:"I think I'm ready to be done and I feel like I can do this stuff at home."  she was compliant with home exercise program given last session.   Response to previous treatment: decrease pain and increased ROM    Pain: 0/10  Location: right wrist     OBJECTIVE     Observation/Appearance: Noted swelling and edema in the wrist, ulnar aspect of pain.      Edema. Measured in centimeters.    12/28/2023 12/28/2023 1/16/24 2/1/24 2/20/2024     Left Right  Right  Right Right   Wrist Crease 16 18.5 17.7  17.6 17.3   Distal Palmar Crease 18 19.2 17.9  17.9 17.9         Elbow and Wrist ROM. Measured in degrees.    12/28/2023 12/28/2023 1/16/24 2/1/24 2/20/2024     Left Right  Right  Right Right   Elbow Ext/Flex            Supination/Pronation wnl 50/WNL  60/WNL  70/WNL 81/WNL   Wrist Ext/Flex 61/71 41/42 45/55  50/55 58/60   Wrist RD/UD 12/40 8/21  15/30  15/30 15/45         Strength (Dynamometer) and Pinch Strength (Pinch Gauge)  Measured in pounds.    3/8/2024 3/8/2024         Left Right       Rung II 40#  30#       [place] Pinch 11# 10#       3pt Pinch 8# 8#           Treatment     Lu received the following supervised modalities after " being cleared for contradictions for 10 minutes:   -Paraffin to decrease pain and stiffness and increase tissue elasticity    Lu received the following manual therapy techniques for 10 minutes:   -IASTM and scar massage    Lu received therapeutic activities for 25 minutes including:  -TGEs  -Prayer stretch 2 ways 3/30 sec to tolerance   -Wrist AROM 2# 3 ways   -Wrist maze x 3 min  -Wrist wheel 2 ways x 2 min each  -Yellow tweb presses, /twist  -Pom pom  Red CP one bucket  -red CP and small poms   -red flexbar smiles, frowns, twists      Home Exercises and Education Provided     Education provided:   - diagnosis, precautions and progression of treatment  - Progress towards goals     Written Home Exercises Provided: Patient instructed to cont prior HEP.  Exercises were reviewed and Lu was able to demonstrate them prior to the end of the session.  Lu demonstrated good  understanding of the HEP provided.   .   See EMR under Patient Instructions for exercises provided prior visit.        Assessment   Patient is now 13 weeks post op and has attended skilled OT for 13 visits. She is doing well and had no pain following initiation of strengthening at last tx session. She tolerates an increased resistance to wrist and forearm strengthening today with no c/o pain or difficulty.  Patient demonstrates improvement in the following areas: increased ROM and strength in the R wrist. She is independent in all B/IADLs  Remaining deficits include: she still notes some weakness in the RUE if she was to lift something heavy, however this is improving.   At this point, patient has met LTGs and reached max potential with skilled OT. I recommend patient transition to an independent HEP. Patient agrees with this plan. I review and discuss discharge HEP and patient demonstrates and verbalizes understanding and independence with all information presented. This is to serve as a formal discharge from skilled OT.      Pt would continue to benefit from skilled OT services. Pt performed all exercises without difficulty. Plan to progress as tolerated.     Lu is progressing well towards her goals and there are no updates to goals at this time. Pt prognosis is Excellent.     Pt will continue to benefit from skilled outpatient occupational therapy to address the deficits listed in the problem list on initial evaluation provide pt/family education and to maximize pt's level of independence in the home and community environment.     Anticipated barriers to occupational therapy: none    Pt's spiritual, cultural and educational needs considered and pt agreeable to plan of care and goals.    Goals:  The following goals were discussed with the patient and patient is in agreement with them as to be addressed in the treatment plan.   Short term Goals:  1) Initiate HEP Met  2) Pt will increase AROM of L hand and wrist by 5-10 degrees in order to assist with carrying moving and handling by 4 weeks. Met  3) Pt will reduce edema by .5-1 cm in affected R wrist by 4 weeks. Met  4) Pt will reduce pain to less than 4/10 by 4 weeks. Met, 3/8/2024  5) Pt will increase functional  strength by 5# in order to A in opening containers for med management or home management tasks by 4 weeks.  Met, 3/8/2024  6) Patient will be able to achieve less than or equal to 50% on the FOTO, demonstrating overall improved functional ability with upper extremity. (Self-care category) Met, 3/8/2024     Long Term Goals:  Goals to be met by discharge:  1) Independent with HEP Met, 3/8/2024  2) Pt will increase R hand CHAMBERLAIN by 25 degrees in order to increase functional use for grasp with home management or work related tasks by d/c.  Met, 3/8/2024  3) Pt will decrease edema to trace or none to increase functional ROM by d/c. Met, 3/8/2024  4) Pt will decrease pain to trace or none while completing light home management tasks or work related tasks by d/c. Met,  3/8/2024  5) Patient will be able to achieve less than or equal to 25% on the FOTO, demonstrating overall improved functional ability with upper extremity.  (Self-care category) Not met 3/8/2024, however, she notes independence in B/IADLs    Plan   Discharge today    Meredith Garcia, OT

## 2024-03-08 ENCOUNTER — CLINICAL SUPPORT (OUTPATIENT)
Dept: REHABILITATION | Facility: HOSPITAL | Age: 89
End: 2024-03-08
Payer: MEDICARE

## 2024-03-08 DIAGNOSIS — M25.60 STIFFNESS IN JOINT: Primary | ICD-10-CM

## 2024-03-08 DIAGNOSIS — M25.531 ARTHRALGIA OF RIGHT WRIST: ICD-10-CM

## 2024-03-08 PROCEDURE — 97018 PARAFFIN BATH THERAPY: CPT | Mod: PN

## 2024-03-08 PROCEDURE — 97530 THERAPEUTIC ACTIVITIES: CPT | Mod: PN

## 2024-03-08 PROCEDURE — 97140 MANUAL THERAPY 1/> REGIONS: CPT | Mod: PN

## 2024-04-21 ENCOUNTER — CLINICAL SUPPORT (OUTPATIENT)
Dept: CARDIOLOGY | Facility: HOSPITAL | Age: 89
End: 2024-04-21
Attending: INTERNAL MEDICINE
Payer: MEDICARE

## 2024-04-21 ENCOUNTER — CLINICAL SUPPORT (OUTPATIENT)
Dept: CARDIOLOGY | Facility: HOSPITAL | Age: 89
End: 2024-04-21
Payer: MEDICARE

## 2024-04-21 DIAGNOSIS — R00.1 BRADYCARDIA, UNSPECIFIED: ICD-10-CM

## 2024-04-21 PROCEDURE — 93296 REM INTERROG EVL PM/IDS: CPT | Performed by: INTERNAL MEDICINE

## 2024-04-21 PROCEDURE — 93294 REM INTERROG EVL PM/LDLS PM: CPT | Mod: ,,, | Performed by: INTERNAL MEDICINE

## 2024-04-26 LAB
OHS CV AF BURDEN PERCENT: < 1
OHS CV DC REMOTE DEVICE TYPE: NORMAL
OHS CV ICD SHOCK: NO
OHS CV RV PACING PERCENT: 1 %

## 2024-05-23 ENCOUNTER — OFFICE VISIT (OUTPATIENT)
Dept: CARDIOLOGY | Facility: CLINIC | Age: 89
End: 2024-05-23
Payer: MEDICARE

## 2024-05-23 VITALS
HEIGHT: 64 IN | HEART RATE: 62 BPM | BODY MASS INDEX: 23.71 KG/M2 | DIASTOLIC BLOOD PRESSURE: 72 MMHG | OXYGEN SATURATION: 98 % | WEIGHT: 138.88 LBS | SYSTOLIC BLOOD PRESSURE: 122 MMHG

## 2024-05-23 DIAGNOSIS — R06.02 SOBOE (SHORTNESS OF BREATH ON EXERTION): ICD-10-CM

## 2024-05-23 DIAGNOSIS — N18.31 STAGE 3A CHRONIC KIDNEY DISEASE: ICD-10-CM

## 2024-05-23 DIAGNOSIS — E78.2 MIXED HYPERLIPIDEMIA: ICD-10-CM

## 2024-05-23 DIAGNOSIS — I10 PRIMARY HYPERTENSION: Primary | ICD-10-CM

## 2024-05-23 DIAGNOSIS — I51.89 DIASTOLIC DYSFUNCTION: ICD-10-CM

## 2024-05-23 DIAGNOSIS — Z95.0 CARDIAC PACEMAKER IN SITU: ICD-10-CM

## 2024-05-23 DIAGNOSIS — I07.9 TRICUSPID VALVE DISORDER: ICD-10-CM

## 2024-05-23 DIAGNOSIS — I50.32 CHRONIC DIASTOLIC HEART FAILURE: ICD-10-CM

## 2024-05-23 DIAGNOSIS — I27.29 OTHER SECONDARY PULMONARY HYPERTENSION: ICD-10-CM

## 2024-05-23 DIAGNOSIS — I49.5 SSS (SICK SINUS SYNDROME): ICD-10-CM

## 2024-05-23 PROCEDURE — 99214 OFFICE O/P EST MOD 30 MIN: CPT | Mod: S$GLB,,,

## 2024-05-23 PROCEDURE — 1159F MED LIST DOCD IN RCRD: CPT | Mod: CPTII,S$GLB,,

## 2024-05-23 PROCEDURE — 3288F FALL RISK ASSESSMENT DOCD: CPT | Mod: CPTII,S$GLB,,

## 2024-05-23 PROCEDURE — 99999 PR PBB SHADOW E&M-EST. PATIENT-LVL III: CPT | Mod: PBBFAC,,,

## 2024-05-23 PROCEDURE — 1101F PT FALLS ASSESS-DOCD LE1/YR: CPT | Mod: CPTII,S$GLB,,

## 2024-05-23 PROCEDURE — 1126F AMNT PAIN NOTED NONE PRSNT: CPT | Mod: CPTII,S$GLB,,

## 2024-05-23 NOTE — PROGRESS NOTES
Cardiology Clinic note    Subjective:   Patient ID:  Lu White is a 89 y.o. female who presents for follow-up of HTN, PPM    5/23/2024: Previously seen by Dr. Chung as below, initial visit for me. S/P PPM for SSS, follows EP Dr. Coats. Patient seen in clinic reports overall doing well. Stays active, still independent and able to shop and care for home with no issues. Patient denies CP, SOB, orthopnea, PND, syncope, palpitations, LE edema. LAM improved since PPM. Reports compliance and tolerance with all medications.    HPI:   Here for f/u   She is feeling much better after the PPM placed.   No more shortness of breath   She is fairly active. Still does house works, shopping with no issues at all  No chest pain, no palpitations. No syncope.     Plan to have knee surgery   No CVA, No CKD.      EKG 3/2022 .  Atrial paced, no ischemic changes.        PMH: TR, HTN, HLP.      Prior cardiovascular  Hx  --------------------------------        - PET stress 9/5/2017   NO CLINICALLY SIGNIFICANT STRESS INDUCED PERFUSION DEFECTS as assessed with PET perfusion imaging.   1. There is no evidence of a discrete hemodynamically significant coronary stenosis.   2. Although no clinically significant stress defect is seen, there is a proximal to distal longitudinal perfusion gradient. These results suggest mild endothelial dysfunction due to mild, diffuse, non-obstructive coronary atherosclerosis without clinically significant, localized perfusion defects.   3. Resting wall motion is physiologic. Stress wall motion is physiologic.   4. LV function is normal at rest and stress.  (normal is >= 51%)   5. The ventricular volumes are normal at rest and stress.   6. The extracardiac distribution of radioactivity is normal.   7. There was no previous study available to compare.         - Echo 6/17/2021  The left ventricle is normal in size with normal systolic function.  The estimated ejection fraction is 55%.  Grade I left  ventricular diastolic dysfunction.  Normal right ventricular size with normal right ventricular systolic function.  Mild to moderate TR.  The estimated PA systolic pressure is 39 mmHg.  Normal central venous pressure (3 mmHg).        - ECHO 8/30/2017    1 - Normal left ventricular systolic function (EF 60-65%).     2 - Mild left atrial enlargement.     3 - Impaired LV relaxation, normal LAP (grade 1 diastolic dysfunction).     4 - Normal right ventricular systolic function .     5 - Mild to moderate tricuspid regurgitation.     6 - The estimated PA systolic pressure is 30 mmHg.      - EKG 6/9/20121 sinus bradycardia, PACs, no acute St-T changes, U wave         - Holter monitor 6/17/2021  Predominant Rhythm Sinus rhythm with heart rates varying between 31 and 101 bpm with an average of 49 bpm.  There were very rare PVCs  There were very frequent PACs  There were 2 runs EAT and lasting for 3 to 10 beats. The rate varied between 130 and 146 bpm.  Reported symptoms correlated with Sinus rhthm, sinus bradycardia. Two reported symptoms correlated with junctional rhythm.    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    Cardiac pacemaker in situ 01/25/2022    SSS (sick sinus syndrome) 09/23/2021    Tricuspid valve disorder 04/20/2021    Stage 3 chronic kidney disease 04/17/2019    Senile purpura 06/22/2018    Shoulder pain 01/18/2018    Subacromial bursitis 01/18/2018    Subacromial impingement 01/18/2018    Complete tear of right rotator cuff 12/28/2017    Low oxygen saturation 10/17/2017    Diastolic dysfunction 09/22/2017     Mild relaxation  abnormality      Family history of other specified conditions 08/21/2017    Primary osteoarthritis of right knee 08/04/2017    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    Symptomatic bradycardia 06/16/2016    Chronic diastolic heart failure 06/16/2016    Other secondary pulmonary hypertension 06/16/2016    Diverticular disease     Insomnia 08/25/2014    HTN (hypertension) 08/25/2014    HLD (hyperlipidemia) 08/25/2014    SOBOE (shortness of breath on exertion) 08/25/2014       Patient's Medications   New Prescriptions    No medications on file   Previous Medications    ACETAMINOPHEN (TYLENOL) 500 MG TABLET    Take 2 tablets (1,000 mg total) by mouth 3 (three) times daily.    ASPIRIN 325 MG TABLET    Take 325 mg by mouth once daily. 1/2 tablet daily    CALCIUM-VITAMIN D3 (OS-VIJAY 500 + D3) 500 MG(1,250MG) -200 UNIT PER TABLET    Take 1 tablet by mouth 2 (two) times daily with meals.    GINKGO BILOBA 120 MG TAB    Take 1 tablet by mouth once daily.    HYDROCHLOROTHIAZIDE (HYDRODIURIL) 25 MG TABLET    Take 1 tablet by mouth once daily    LOVASTATIN (MEVACOR) 20 MG TABLET    TAKE 1 TABLET BY MOUTH ONCE DAILY IN THE EVENING    MULTIVITAMIN WITH MINERALS TABLET    Take 1 tablet by mouth once daily.    TEMAZEPAM (RESTORIL) 15 MG CAP    TAKE 1 CAPSULE (15 MG TOTAL) BY MOUTH EVERY EVENING.    TRAMADOL (ULTRAM) 50 MG TABLET    Take 1 tablet (50 mg total) by mouth every 6 (six) hours as needed for Pain.   Modified Medications    No medications on file   Discontinued Medications    No medications on file        Review of Systems   Constitutional: Negative for chills, decreased appetite, diaphoresis, malaise/fatigue, weight gain and weight loss.   Cardiovascular:  Positive for dyspnea on exertion. Negative for chest pain, claudication, irregular heartbeat, leg swelling, near-syncope, orthopnea, palpitations, paroxysmal nocturnal dyspnea and syncope.   Respiratory:  Negative for cough, hemoptysis, shortness of breath and  "snoring.    Gastrointestinal:  Negative for bloating, abdominal pain, nausea and vomiting.   Neurological:  Negative for light-headedness and weakness.       Family History   Problem Relation Name Age of Onset    Heart disease Father      No Known Problems Sister      No Known Problems Brother         Social History     Socioeconomic History    Marital status:    Tobacco Use    Smoking status: Never    Smokeless tobacco: Never   Substance and Sexual Activity    Alcohol use: No    Drug use: No            Objective:   Vitals  Vitals:    05/23/24 1328 05/23/24 1330   BP: 120/80 122/72   Pulse: 62    SpO2: 98%    Weight: 63 kg (138 lb 14.2 oz)    Height: 5' 4" (1.626 m)           Physical Exam  Vitals and nursing note reviewed.   Constitutional:       Appearance: Normal appearance.   Cardiovascular:      Rate and Rhythm: Normal rate and regular rhythm.      Heart sounds: No murmur heard.     No gallop.   Pulmonary:      Effort: Pulmonary effort is normal.      Breath sounds: Normal breath sounds.   Abdominal:      General: Bowel sounds are normal. There is no distension.      Palpations: Abdomen is soft.      Tenderness: There is no abdominal tenderness.   Skin:     General: Skin is warm and dry.   Neurological:      Mental Status: She is alert and oriented to person, place, and time.         Lab Results    Lab Results   Component Value Date    WBC 8.37 12/06/2023    HGB 11.4 (L) 12/06/2023    HCT 35.9 (L) 12/06/2023    MCV 93 12/06/2023       Lab Results   Component Value Date     12/06/2023    INR 1.0 10/29/2021       Lab Results   Component Value Date    K 4.0 12/06/2023    BUN 28 12/06/2023    CREATININE 0.8 12/06/2023       Lab Results   Component Value Date    GLU 72 12/06/2023       Lab Results   Component Value Date    AST 18 12/06/2023    ALT 12 12/06/2023    ALBUMIN 3.6 12/06/2023    PROT 6.6 12/06/2023       Lab Results   Component Value Date    CHOL 192 03/23/2023    HDL 70 03/23/2023    " "LDLCALC 111.0 03/23/2023    TRIG 55 03/23/2023       No results found for: "CRP", "BNP"      Assessment:     1. Primary hypertension    2. Mixed hyperlipidemia    3. SSS (sick sinus syndrome)    4. Cardiac pacemaker in situ    5. Tricuspid valve disorder    6. Chronic diastolic heart failure    7. Other secondary pulmonary hypertension    8. Diastolic dysfunction    9. Stage 3a chronic kidney disease    10. SOBOE (shortness of breath on exertion)        Plan:     HTN  -well controlled  -continue HCTZ   - previously on Losartan, no indication at this time to restart  - continue home monitoring and log    2. HLD  -continue statin    3. SSS  -s/p PPM  -follows Dr. Jorge L PANIAGUA    4. Tricuspid valve dx  -will update echo  -denies cardiac s/s  -monitor with yearly echos    5. Diastolic HF  -repeat echo  -continue HCTZ    6. LAM  -chronic, improved since PPM implantation  -echo for eval      The ASCVD Risk score (Dotty DK, et al., 2019) failed to calculate for the following reasons:    The 2019 ASCVD risk score is only valid for ages 40 to 79    Orders Placed This Encounter   Procedures    Echo     Standing Status:   Future     Standing Expiration Date:   5/23/2025     Order Specific Question:   Release to patient     Answer:   Immediate       She expressed verbal understanding and agreed with the plan    Follow up in 6m    Pertinent cardiac images and EKG reviewed independently.    Continue with current medical plan and lifestyle changes.  Return sooner for concerns or questions. If symptoms persist go to the ED.  I have reviewed all pertinent data including patient's medical history in detail and updated the computerized patient record.     Counseling included discussion regarding imaging findings, diagnosis, possibilities, treatment options, risks and benefits.    Thank you for the opportunity to care for this patient. Will be available for questions if needed.        Signed:  Raman Matos DNP  05/23/2024       "

## 2024-06-20 ENCOUNTER — HOSPITAL ENCOUNTER (OUTPATIENT)
Dept: CARDIOLOGY | Facility: HOSPITAL | Age: 89
Discharge: HOME | End: 2024-06-20
Payer: MEDICARE

## 2024-06-20 VITALS — HEIGHT: 64 IN | WEIGHT: 138 LBS | BODY MASS INDEX: 23.56 KG/M2

## 2024-06-20 DIAGNOSIS — I07.9 TRICUSPID VALVE DISORDER: ICD-10-CM

## 2024-06-20 LAB
APICAL FOUR CHAMBER EJECTION FRACTION: 65 %
APICAL TWO CHAMBER EJECTION FRACTION: 68 %
ASCENDING AORTA: 3.2 CM
AV INDEX (PROSTH): 0.79
AV MEAN GRADIENT: 5 MMHG
AV PEAK GRADIENT: 9 MMHG
AV VALVE AREA BY VELOCITY RATIO: 1.78 CM²
AV VALVE AREA: 1.92 CM²
AV VELOCITY RATIO: 0.73
BSA FOR ECHO PROCEDURE: 1.68 M2
CV ECHO LV RWT: 0.39 CM
DOP CALC AO PEAK VEL: 1.54 M/S
DOP CALC AO VTI: 30.3 CM
DOP CALC LVOT AREA: 2.4 CM2
DOP CALC LVOT DIAMETER: 1.76 CM
DOP CALC LVOT PEAK VEL: 1.13 M/S
DOP CALC LVOT STROKE VOLUME: 58.12 CM3
DOP CALC MV VTI: 22.2 CM
DOP CALCLVOT PEAK VEL VTI: 23.9 CM
E WAVE DECELERATION TIME: 435.74 MSEC
E/A RATIO: 0.65
E/E' RATIO: 10.2 M/S
ECHO LV POSTERIOR WALL: 0.79 CM (ref 0.6–1.1)
FRACTIONAL SHORTENING: 36 % (ref 28–44)
INTERVENTRICULAR SEPTUM: 0.8 CM (ref 0.6–1.1)
IVC DIAMETER: 2.35 CM
LA MAJOR: 3.88 CM
LA MINOR: 3.04 CM
LA WIDTH: 3.2 CM
LEFT ATRIUM AREA SYSTOLIC (APICAL 2 CHAMBER): 7.63 CM2
LEFT ATRIUM AREA SYSTOLIC (APICAL 4 CHAMBER): 9.77 CM2
LEFT ATRIUM SIZE: 2.44 CM
LEFT ATRIUM VOLUME INDEX MOD: 10.2 ML/M2
LEFT ATRIUM VOLUME INDEX: 13.5 ML/M2
LEFT ATRIUM VOLUME MOD: 17.03 CM3
LEFT ATRIUM VOLUME: 22.62 CM3
LEFT INTERNAL DIMENSION IN SYSTOLE: 2.64 CM (ref 2.1–4)
LEFT VENTRICLE DIASTOLIC VOLUME INDEX: 44.32 ML/M2
LEFT VENTRICLE DIASTOLIC VOLUME: 74.01 ML
LEFT VENTRICLE END DIASTOLIC VOLUME APICAL 2 CHAMBER: 47.23 ML
LEFT VENTRICLE END DIASTOLIC VOLUME APICAL 4 CHAMBER: 44.03 ML
LEFT VENTRICLE END SYSTOLIC VOLUME APICAL 2 CHAMBER: 13.97 ML
LEFT VENTRICLE END SYSTOLIC VOLUME APICAL 4 CHAMBER: 18.37 ML
LEFT VENTRICLE MASS INDEX: 58 G/M2
LEFT VENTRICLE SYSTOLIC VOLUME INDEX: 15.3 ML/M2
LEFT VENTRICLE SYSTOLIC VOLUME: 25.53 ML
LEFT VENTRICULAR INTERNAL DIMENSION IN DIASTOLE: 4.1 CM (ref 3.5–6)
LEFT VENTRICULAR MASS: 96.53 G
LV LATERAL E/E' RATIO: 7.29 M/S
LV SEPTAL E/E' RATIO: 17 M/S
LVED V (TEICH): 74.01 ML
LVES V (TEICH): 25.53 ML
LVOT MG: 3.06 MMHG
LVOT MV: 0.84 CM/S
MV A" WAVE DURATION": 133.21 MSEC
MV MEAN GRADIENT: 1 MMHG
MV PEAK A VEL: 0.78 M/S
MV PEAK E VEL: 0.51 M/S
MV PEAK GRADIENT: 2 MMHG
MV STENOSIS PRESSURE HALF TIME: 126.37 MS
MV VALVE AREA BY CONTINUITY EQUATION: 2.62 CM2
MV VALVE AREA P 1/2 METHOD: 1.74 CM2
OHS LV EJECTION FRACTION SIMPSONS BIPLANE MOD: 67 %
PISA TR MAX VEL: 2.64 M/S
PULM VEIN S/D RATIO: 2.21
PV MV: 0.76 M/S
PV PEAK D VEL: 0.33 M/S
PV PEAK GRADIENT: 5 MMHG
PV PEAK S VEL: 0.73 M/S
PV PEAK VELOCITY: 1.1 M/S
RA MAJOR: 4.16 CM
RA PRESSURE ESTIMATED: 8 MMHG
RA WIDTH: 3.01 CM
RV TB RVSP: 11 MMHG
RV TISSUE DOPPLER FREE WALL SYSTOLIC VELOCITY 1 (APICAL 4 CHAMBER VIEW): 11.15 CM/S
SINUS: 3.5 CM
STJ: 2.87 CM
TDI LATERAL: 0.07 M/S
TDI SEPTAL: 0.03 M/S
TDI: 0.05 M/S
TR MAX PG: 28 MMHG
TRICUSPID ANNULAR PLANE SYSTOLIC EXCURSION: 2 CM
TV REST PULMONARY ARTERY PRESSURE: 36 MMHG
Z-SCORE OF LEFT VENTRICULAR DIMENSION IN END DIASTOLE: -1.3
Z-SCORE OF LEFT VENTRICULAR DIMENSION IN END SYSTOLE: -0.72

## 2024-06-20 PROCEDURE — 93306 TTE W/DOPPLER COMPLETE: CPT

## 2024-07-21 ENCOUNTER — CLINICAL SUPPORT (OUTPATIENT)
Dept: CARDIOLOGY | Facility: HOSPITAL | Age: 89
End: 2024-07-21
Payer: MEDICARE

## 2024-07-21 ENCOUNTER — CLINICAL SUPPORT (OUTPATIENT)
Dept: CARDIOLOGY | Facility: HOSPITAL | Age: 89
End: 2024-07-21
Attending: INTERNAL MEDICINE
Payer: MEDICARE

## 2024-07-21 DIAGNOSIS — R00.1 BRADYCARDIA, UNSPECIFIED: ICD-10-CM

## 2024-07-21 PROCEDURE — 93294 REM INTERROG EVL PM/LDLS PM: CPT | Mod: ,,, | Performed by: INTERNAL MEDICINE

## 2024-07-21 PROCEDURE — 93296 REM INTERROG EVL PM/IDS: CPT | Performed by: INTERNAL MEDICINE

## 2024-09-09 ENCOUNTER — HOSPITAL ENCOUNTER (EMERGENCY)
Facility: HOSPITAL | Age: 89
Discharge: HOME | End: 2024-09-09
Attending: EMERGENCY MEDICINE
Payer: MEDICARE

## 2024-09-09 VITALS
WEIGHT: 135 LBS | BODY MASS INDEX: 23.05 KG/M2 | DIASTOLIC BLOOD PRESSURE: 74 MMHG | RESPIRATION RATE: 20 BRPM | OXYGEN SATURATION: 100 % | HEIGHT: 64 IN | HEART RATE: 62 BPM | TEMPERATURE: 99 F | SYSTOLIC BLOOD PRESSURE: 195 MMHG

## 2024-09-09 DIAGNOSIS — M25.561 KNEE PAIN, RIGHT: ICD-10-CM

## 2024-09-09 DIAGNOSIS — S61.511A LACERATION OF RIGHT WRIST, INITIAL ENCOUNTER: ICD-10-CM

## 2024-09-09 DIAGNOSIS — S81.011A LACERATION OF RIGHT KNEE, INITIAL ENCOUNTER: ICD-10-CM
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
## 2024-09-09 PROCEDURE — 99283 EMERGENCY DEPT VISIT LOW MDM: CPT | Mod: 25

## 2024-09-09 PROCEDURE — 12004 RPR S/N/AX/GEN/TRK7.6-12.5CM: CPT

## 2024-09-09 RX ORDER — CEPHALEXIN 500 MG/1
500 CAPSULE ORAL 4 TIMES DAILY
Qty: 20 CAPSULE | Refills: 0 | Status: SHIPPED | OUTPATIENT
Start: 2024-09-09 | End: 2024-09-14

## 2024-09-09 RX ORDER — LIDOCAINE HYDROCHLORIDE 10 MG/ML
10 INJECTION, SOLUTION EPIDURAL; INFILTRATION; INTRACAUDAL; PERINEURAL
Status: DISCONTINUED | OUTPATIENT
Start: 2024-09-09 | End: 2024-09-09 | Stop reason: HOSPADM

## 2024-09-09 NOTE — ED PROVIDER NOTES
"Encounter Date: 9/9/2024       History     Chief Complaint   Patient presents with    Fall     Tripped and fell in post office parking lot. Sustained an approx. 3" laceration to (R) knee and an approx. 2" laceration to (R) hand. Denies head injury of loc.      The history is provided by the patient.   Laceration   The incident occurred just prior to arrival. The laceration is located on the Right leg (R knee). The laceration is 8 cm in size. Injury mechanism: cement side walk/curb. The pain is at a severity of 1/10. She reports no foreign bodies present. Her tetanus status is out of date.     Review of patient's allergies indicates:  No Known Allergies  Past Medical History:   Diagnosis Date    Arthritis     Disorder of kidney and ureter     Diverticular disease     Hyperlipidemia     Hypertension      Past Surgical History:   Procedure Laterality Date    A-V CARDIAC PACEMAKER INSERTION N/A 11/01/2021    Procedure: INSERTION, CARDIAC PACEMAKER, DUAL CHAMBER;  Surgeon: Brayden Coats MD;  Location: [facility] EP LAB;  Service: Cardiology;  Laterality: N/A;  SB, Dual PPM, SJM, MAC, DM, 3 Prep    CARPAL TUNNEL RELEASE Right 12/8/2023    Procedure: RELEASE, CARPAL TUNNEL - RIGHT;  Surgeon: Avery Burkett MD;  Location: [facility] OR;  Service: Orthopedics;  Laterality: Right;    EYE SURGERY      both eyes catarac    HYSTERECTOMY  1970    JOINT REPLACEMENT      bilateral knee    OPEN REDUCTION AND INTERNAL FIXATION (ORIF) OF FRACTURE OF DISTAL RADIUS Right 12/8/2023    Procedure: ORIF, FRACTURE, RADIUS, DISTAL - RIGHT vs bride plate;  Surgeon: Avery Burkett MD;  Location: [facility] OR;  Service: Orthopedics;  Laterality: Right;    OPEN REDUCTION AND INTERNAL FIXATION (ORIF) OF FRACTURE OF ULNA Right 12/8/2023    Procedure: ORIF, FRACTURE, ULNA - RIGHT;  Surgeon: Avery Burkett MD;  Location: [facility] OR;  Service: Orthopedics;  Laterality: Right;    OVARY SURGERY      ROTATOR CUFF REPAIR Right     TOTAL KNEE REPLACEMENT USING " COMPUTER NAVIGATION Right     TOTAL SHOULDER ARTHROPLASTY Right      Family History   Problem Relation Name Age of Onset    Heart disease Father      No Known Problems Sister      No Known Problems Brother       Social History     Tobacco Use    Smoking status: Never    Smokeless tobacco: Never   Substance Use Topics    Alcohol use: No    Drug use: No     Review of Systems   Constitutional:  Negative for fever.   HENT:  Negative for sore throat.    Respiratory:  Negative for shortness of breath.    Cardiovascular:  Negative for chest pain.   Gastrointestinal:  Negative for nausea.   Genitourinary:  Negative for dysuria.   Musculoskeletal:  Negative for back pain.   Skin:  Positive for color change and wound. Negative for rash.   Neurological:  Negative for weakness.   Hematological:  Does not bruise/bleed easily.   All other systems reviewed and are negative.      Physical Exam     Initial Vitals [09/09/24 1233]   BP Pulse Resp Temp SpO2   (!) 195/74 62 20 98.5 °F (36.9 °C) 100 %      MAP       --         Physical Exam    Nursing note and vitals reviewed.  Constitutional: She appears well-developed and well-nourished. No distress.   elderly   HENT:   Head: Normocephalic and atraumatic.   Eyes: EOM are normal. Pupils are equal, round, and reactive to light.   Neck: Neck supple.   Normal range of motion.  Cardiovascular:  Normal rate, regular rhythm, normal heart sounds and intact distal pulses.           Pulmonary/Chest: Breath sounds normal. No stridor. She has no wheezes. She has no rhonchi.   Abdominal: Abdomen is soft. Bowel sounds are normal. She exhibits no mass. There is no rebound and no guarding.   Musculoskeletal:         General: No edema. Normal range of motion.      Right wrist: Laceration present. No swelling, tenderness or bony tenderness. Normal range of motion.      Cervical back: Normal range of motion and neck supple.      Right knee: Laceration present. No swelling, deformity or bony tenderness.  Normal range of motion. No tenderness. No LCL laxity, MCL laxity, ACL laxity or PCL laxity. Normal alignment and normal patellar mobility. Normal pulse.      Instability Tests: Anterior drawer test negative. Posterior drawer test negative.      Comments: 3 cm linear superficial laceration to dorsal lateral right wrist/distal forearm-minimal bleeding     Neurological: She is alert and oriented to person, place, and time. She has normal strength. No sensory deficit. GCS score is 15. GCS eye subscore is 4. GCS verbal subscore is 5. GCS motor subscore is 6.   Skin: Skin is warm and dry. Capillary refill takes less than 2 seconds. No rash noted.   Psychiatric: She has a normal mood and affect. Her behavior is normal. Judgment and thought content normal.         ED Course   Lac Repair    Date/Time: 9/9/2024 2:00 PM    Performed by: Bryan Craig MD  Authorized by: Bryan Craig MD    Consent:     Consent obtained:  Verbal    Consent given by:  Patient    Risks, benefits, and alternatives were discussed: yes      Risks discussed:  Infection, retained foreign body, pain and need for additional repair (Open knee laceration)    Alternatives discussed:  No treatment and delayed treatment  Universal protocol:     Procedure explained and questions answered to patient or proxy's satisfaction: yes      Imaging studies available: Patient declined imaging.      Patient identity confirmed:  Verbally with patient and arm band  Anesthesia:     Anesthesia method:  Local infiltration    Local anesthetic:  Lidocaine 1% w/o epi  Laceration details:     Location:  Leg    Leg location:  R knee    Length (cm):  8  Pre-procedure details:     Preparation:  Patient was prepped and draped in usual sterile fashion  Exploration:     Hemostasis achieved with:  Direct pressure    Wound exploration: wound explored through full range of motion and entire depth of wound visualized      Wound extent: no fascia violation noted, no foreign bodies/material  noted, no muscle damage noted, no tendon damage noted, no underlying fracture noted and no vascular damage noted      Wound extent comment:  No evidence of joint involvement    Contaminated: no    Treatment:     Area cleansed with:  Povidone-iodine (Hydrogen peroxide)    Amount of cleaning:  Extensive    Irrigation solution:  Sterile saline    Irrigation volume:  1000ml    Irrigation method:  Syringe    Debridement:  Moderate    Undermining:  None    Scar revision: no    Skin repair:     Repair method:  Sutures    Suture size:  3-0    Suture material:  Nylon    Suture technique:  Simple interrupted    Number of sutures:  10  Approximation:     Approximation:  Close  Repair type:     Repair type:  Simple  Post-procedure details:     Dressing:  Antibiotic ointment and non-adherent dressing    Procedure completion:  Tolerated well, no immediate complications  Lac Repair    Date/Time: 9/10/2024 5:54 PM    Performed by: Bryan Craig MD  Authorized by: Bryan Craig MD    Consent:     Consent obtained:  Verbal    Consent given by:  Patient    Risks, benefits, and alternatives were discussed: yes      Risks discussed:  Infection, pain and need for additional repair  Universal protocol:     Patient identity confirmed:  Verbally with patient and arm band  Anesthesia:     Anesthesia method:  None  Laceration details:     Location:  Hand    Hand location:  R wrist    Length (cm):  3  Exploration:     Wound exploration: wound explored through full range of motion and entire depth of wound visualized      Wound extent: no underlying fracture noted      Contaminated: no    Treatment:     Area cleansed with:  Povidone-iodine (Hydrogen peroxide)    Amount of cleaning:  Standard    Irrigation solution:  Sterile saline    Irrigation method:  Syringe    Debridement:  None    Undermining:  None  Skin repair:     Repair method:  Tissue adhesive  Approximation:     Approximation:  Close  Repair type:     Repair type:   Simple  Post-procedure details:     Dressing:  Open (no dressing)    Procedure completion:  Tolerated well, no immediate complications    Labs Reviewed - No data to display       Imaging Results    None          Medications - No data to display    Medical Decision Making  Problems Addressed:  Fall, initial encounter: acute illness or injury that poses a threat to life or bodily functions  Knee pain, right: acute illness or injury that poses a threat to life or bodily functions  Laceration of right knee, initial encounter: acute illness or injury that poses a threat to life or bodily functions  Laceration of right wrist, initial encounter: self-limited or minor problem    Amount and/or Complexity of Data Reviewed  Radiology:      Details: We discussed need for x-ray however the patient is not having any significant pain and does not feel like there is any risk of foreign body.  She declines imaging at this time.    Risk  OTC drugs.  Prescription drug management.  Risk Details: Differential diagnosis includes laceration, retained foreign body, open knee, underlying fracture, etc.                                      Clinical Impression:  Final diagnoses:  [M25.561] Knee pain, right  [W19.XXXA] Fall, initial encounter (Primary)  [S81.011A] Laceration of right knee, initial encounter  [S61.511A] Laceration of right wrist, initial encounter          ED Disposition Condition    Discharge Stable          ED Prescriptions       Medication Sig Dispense Start Date End Date Auth. Provider    cephALEXin (KEFLEX) 500 MG capsule Take 1 capsule (500 mg total) by mouth 4 (four) times daily. for 5 days 20 capsule 9/9/2024 9/14/2024 Bryan Craig MD          Follow-up Information       Follow up With Specialties Details Why Contact Info    Vijay Carrizales MD Internal Medicine Schedule an appointment as soon as possible for a visit in 1 week Sutures out in 7-10 days [address]  [address]  [facility] [id number] [phone]                Bryan Craig MD  09/10/24 1845

## 2024-09-19 ENCOUNTER — LAB VISIT (OUTPATIENT)
Dept: LAB | Facility: HOSPITAL | Age: 89
End: 2024-09-19
Attending: INTERNAL MEDICINE
Payer: MEDICARE

## 2024-09-19 DIAGNOSIS — I10 PRIMARY HYPERTENSION: ICD-10-CM

## 2024-09-19 DIAGNOSIS — Z13.1 SCREENING FOR DIABETES MELLITUS: ICD-10-CM

## 2024-09-19 DIAGNOSIS — R79.9 ABNORMAL FINDING OF BLOOD CHEMISTRY, UNSPECIFIED: ICD-10-CM

## 2024-09-19 DIAGNOSIS — E78.2 MIXED HYPERLIPIDEMIA: ICD-10-CM

## 2024-09-19 DIAGNOSIS — I51.89 DIASTOLIC DYSFUNCTION: ICD-10-CM

## 2024-09-19 DIAGNOSIS — D69.2 SENILE PURPURA: ICD-10-CM

## 2024-09-19 LAB
ALBUMIN SERPL BCP-MCNC: 3.9 G/DL (ref 3.5–5.2)
ALP SERPL-CCNC: 87 U/L (ref 55–135)
ALT SERPL W/O P-5'-P-CCNC: 15 U/L (ref 10–44)
ANION GAP SERPL CALC-SCNC: 10 MMOL/L (ref 8–16)
AST SERPL-CCNC: 21 U/L (ref 10–40)
BASOPHILS # BLD AUTO: 0.02 K/UL (ref 0–0.2)
BASOPHILS NFR BLD: 0.3 % (ref 0–1.9)
BILIRUB SERPL-MCNC: 0.5 MG/DL (ref 0.1–1)
BUN SERPL-MCNC: 21 MG/DL (ref 10–30)
CALCIUM SERPL-MCNC: 10.2 MG/DL (ref 8.7–10.5)
CHLORIDE SERPL-SCNC: 107 MMOL/L (ref 95–110)
CHOLEST SERPL-MCNC: 197 MG/DL (ref 120–199)
CHOLEST/HDLC SERPL: 3.1 {RATIO} (ref 2–5)
CO2 SERPL-SCNC: 23 MMOL/L (ref 23–29)
CREAT SERPL-MCNC: 0.9 MG/DL (ref 0.5–1.4)
DIFFERENTIAL METHOD BLD: NORMAL
EOSINOPHIL # BLD AUTO: 0.1 K/UL (ref 0–0.5)
EOSINOPHIL NFR BLD: 2 % (ref 0–8)
ERYTHROCYTE [DISTWIDTH] IN BLOOD BY AUTOMATED COUNT: 12.6 % (ref 11.5–14.5)
EST. GFR  (NO RACE VARIABLE): >60 ML/MIN/1.73 M^2
ESTIMATED AVG GLUCOSE: 108 MG/DL (ref 68–131)
GLUCOSE SERPL-MCNC: 92 MG/DL (ref 70–110)
HBA1C MFR BLD: 5.4 % (ref 4–5.6)
HCT VFR BLD AUTO: 37.5 % (ref 37–48.5)
HDLC SERPL-MCNC: 63 MG/DL (ref 40–75)
HDLC SERPL: 32 % (ref 20–50)
HGB BLD-MCNC: 12.2 G/DL (ref 12–16)
IMM GRANULOCYTES # BLD AUTO: 0.02 K/UL (ref 0–0.04)
IMM GRANULOCYTES NFR BLD AUTO: 0.3 % (ref 0–0.5)
LDLC SERPL CALC-MCNC: 116.8 MG/DL (ref 63–159)
LYMPHOCYTES # BLD AUTO: 1.9 K/UL (ref 1–4.8)
LYMPHOCYTES NFR BLD: 29.2 % (ref 18–48)
MCH RBC QN AUTO: 30.3 PG (ref 27–31)
MCHC RBC AUTO-ENTMCNC: 32.5 G/DL (ref 32–36)
MCV RBC AUTO: 93 FL (ref 82–98)
MONOCYTES # BLD AUTO: 0.5 K/UL (ref 0.3–1)
MONOCYTES NFR BLD: 8 % (ref 4–15)
NEUTROPHILS # BLD AUTO: 4 K/UL (ref 1.8–7.7)
NEUTROPHILS NFR BLD: 60.2 % (ref 38–73)
NONHDLC SERPL-MCNC: 134 MG/DL
NRBC BLD-RTO: 0 /100 WBC
PLATELET # BLD AUTO: 226 K/UL (ref 150–450)
PMV BLD AUTO: 9.5 FL (ref 9.2–12.9)
POTASSIUM SERPL-SCNC: 4 MMOL/L (ref 3.5–5.1)
PROT SERPL-MCNC: 6.7 G/DL (ref 6–8.4)
RBC # BLD AUTO: 4.02 M/UL (ref 4–5.4)
SODIUM SERPL-SCNC: 140 MMOL/L (ref 136–145)
TRIGL SERPL-MCNC: 86 MG/DL (ref 30–150)
WBC # BLD AUTO: 6.65 K/UL (ref 3.9–12.7)

## 2024-09-19 PROCEDURE — 80061 LIPID PANEL: CPT | Performed by: INTERNAL MEDICINE

## 2024-09-19 PROCEDURE — 80053 COMPREHEN METABOLIC PANEL: CPT | Performed by: INTERNAL MEDICINE

## 2024-09-19 PROCEDURE — 83036 HEMOGLOBIN GLYCOSYLATED A1C: CPT | Performed by: INTERNAL MEDICINE

## 2024-09-19 PROCEDURE — 85025 COMPLETE CBC W/AUTO DIFF WBC: CPT | Performed by: INTERNAL MEDICINE

## 2024-09-19 PROCEDURE — 36415 COLL VENOUS BLD VENIPUNCTURE: CPT | Performed by: INTERNAL MEDICINE

## 2024-10-20 ENCOUNTER — CLINICAL SUPPORT (OUTPATIENT)
Dept: CARDIOLOGY | Facility: HOSPITAL | Age: 89
End: 2024-10-20
Payer: MEDICARE

## 2024-10-20 ENCOUNTER — CLINICAL SUPPORT (OUTPATIENT)
Dept: CARDIOLOGY | Facility: HOSPITAL | Age: 89
End: 2024-10-20
Attending: INTERNAL MEDICINE
Payer: MEDICARE

## 2024-10-20 DIAGNOSIS — R00.1 BRADYCARDIA, UNSPECIFIED: ICD-10-CM

## 2024-10-20 PROCEDURE — 93294 REM INTERROG EVL PM/LDLS PM: CPT | Mod: ,,, | Performed by: INTERNAL MEDICINE

## 2024-10-20 PROCEDURE — 93296 REM INTERROG EVL PM/IDS: CPT | Performed by: INTERNAL MEDICINE

## 2024-10-21 ENCOUNTER — TELEPHONE (OUTPATIENT)
Dept: ELECTROPHYSIOLOGY | Facility: CLINIC | Age: 89
End: 2024-10-21
Payer: MEDICARE

## 2024-10-21 NOTE — TELEPHONE ENCOUNTER
Returned call to patient. She would like to establish care with Dr. Gordillo in [place]. Recall updated. Message sent to device team to update.

## 2024-10-21 NOTE — TELEPHONE ENCOUNTER
----- Message from Mabel sent at 10/21/2024 12:29 PM CDT -----  Regarding: Letter  Pt [phone] would like a call in ref to the letter she received and she would like to go with Dr. Gordillo.    Thanks

## 2024-10-25 LAB
OHS CV AF BURDEN PERCENT: < 1
OHS CV DC REMOTE DEVICE TYPE: NORMAL
OHS CV RV PACING PERCENT: 1 %

## 2024-12-30 ENCOUNTER — TELEPHONE (OUTPATIENT)
Dept: PODIATRY | Facility: CLINIC | Age: 89
End: 2024-12-30
Payer: MEDICARE

## 2024-12-30 NOTE — TELEPHONE ENCOUNTER
Spoke with Ms Christopher to assist her with making an appt with Dr Tony for an ingrown toenail for 1/14/25 at 8:45 am. No other needs voiced at this time. Verbalized understanding of clinic location with no other needs voiced at this time. Call back encouraged if needed.

## 2024-12-30 NOTE — TELEPHONE ENCOUNTER
----- Message from Nataliya sent at 12/30/2024  1:41 PM CST -----  Type:  Sooner Appointment Request    Caller is requesting a sooner appointment.  Caller declined first available appointment listed below.  Caller will not accept being placed on the waitlist and is requesting a message be sent to doctor.  Name of Caller: pt   When is the first available appointment? 02/13   Symptoms: ingrown toe nail - not affected - not a diabetic pt   Would the patient rather a call back or a response via [facility]? Call   Best Call Back Number:[phone]   Additional Information:

## 2025-01-14 ENCOUNTER — OFFICE VISIT (OUTPATIENT)
Dept: PODIATRY | Facility: CLINIC | Age: OVER 89
End: 2025-01-14
Payer: MEDICARE

## 2025-01-14 VITALS
HEART RATE: 80 BPM | SYSTOLIC BLOOD PRESSURE: 163 MMHG | WEIGHT: 134.06 LBS | HEIGHT: 64 IN | BODY MASS INDEX: 22.89 KG/M2 | DIASTOLIC BLOOD PRESSURE: 75 MMHG

## 2025-01-14 DIAGNOSIS — L60.0 INGROWN NAIL: Primary | ICD-10-CM

## 2025-01-14 PROCEDURE — 99203 OFFICE O/P NEW LOW 30 MIN: CPT | Mod: S$GLB,,, | Performed by: STUDENT IN AN ORGANIZED HEALTH CARE EDUCATION/TRAINING PROGRAM

## 2025-01-14 PROCEDURE — 1125F AMNT PAIN NOTED PAIN PRSNT: CPT | Mod: CPTII,S$GLB,, | Performed by: STUDENT IN AN ORGANIZED HEALTH CARE EDUCATION/TRAINING PROGRAM

## 2025-01-14 PROCEDURE — 99999 PR PBB SHADOW E&M-EST. PATIENT-LVL II: CPT | Mod: PBBFAC,,, | Performed by: STUDENT IN AN ORGANIZED HEALTH CARE EDUCATION/TRAINING PROGRAM

## 2025-01-14 NOTE — PROGRESS NOTES
Subjective:     Patient ID: Lu White is a 89 y.o. female.    Chief Complaint: Ingrown Toenail (Ingrown toenail left 4th toenail and right great toe ingrown)    Lu is a 89 y.o. female who presents to the clinic complaining of painful ingrown toenail on both feet, points to left 4th toe and right great toenails. Denies signs of infection    Review of Systems   Constitutional: Negative for chills, decreased appetite, diaphoresis and fever.   HENT:  Negative for congestion and hearing loss.    Cardiovascular:  Negative for chest pain, claudication, leg swelling and syncope.   Respiratory:  Negative for cough and shortness of breath.    Skin:  Positive for dry skin and nail changes. Negative for color change, flushing, itching, poor wound healing and rash.   Musculoskeletal:  Positive for arthritis and joint pain.   Gastrointestinal:  Negative for nausea and vomiting.   Neurological:  Negative for focal weakness, paresthesias and weakness.   Psychiatric/Behavioral:  Negative for altered mental status. The patient is not nervous/anxious.         Objective:     Physical Exam  Constitutional:       General: She is not in acute distress.     Appearance: She is well-developed. She is not diaphoretic.   Cardiovascular:      Comments: Dorsalis pedis and posterior tibial pulses are within normal limits. Skin temperature is within normal limits. Toes are cool to touch and feet are warm proximally. Hair growth is within normal limits. Skin is normotrophic and without hyperpigmentation. No edema noted. No spider veins or varicosities noted, bilaterally.   Musculoskeletal:      Comments: Adequate joint range of motion without pain, limitation, nor crepitation to bilateral feet and ankle joints. Muscle strength is 5/5 in all groups bilaterally.       Lymphadenopathy:      Comments: Negative lymphangitic streaking    Skin:     General: Skin is warm and dry.      Findings: No lesion.      Comments: Skin is warm and dry, no  acute signs of infection noted. No open wounds, macerations or hyperkeratotic lesions, bilaterally.     Toenails are well trimmed and of normal morphology, bilaterally.      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      Motor: No abnormal muscle tone.      Comments: Light touch within normal limits.    Psychiatric:         Behavior: Behavior normal.         Thought Content: Thought content normal.         Judgment: Judgment normal.           Assessment:      Encounter Diagnosis   Name Primary?    Ingrown nail Yes     Plan:     Lu was seen today for ingrown toenail.    Diagnoses and all orders for this visit:    Ingrown nail      I counseled the patient on her conditions, their implications and medical management.  Utilizing sterile toenail clippers I aggressively trimmed  the offending above mentioned  nail border approximately 3 mm from its edge and carried the nail plate incision down at an angle in order to wedge out the offending cryptotic portion of the nail plate. The offending border was then removed in toto. No blood was drawn. Patient tolerated the procedure well and related significant relief. Site dressed with antibiotic ointment and bandaid, to change same until symptoms resolve.   Toenails debrided 1-10, using sterile nail nippers, as a courtesy, without incident. Patient tolerated well.  Return to clinic PRN

## 2025-01-19 ENCOUNTER — CLINICAL SUPPORT (OUTPATIENT)
Dept: CARDIOLOGY | Facility: HOSPITAL | Age: OVER 89
End: 2025-01-19
Payer: MEDICARE

## 2025-01-19 ENCOUNTER — CLINICAL SUPPORT (OUTPATIENT)
Dept: CARDIOLOGY | Facility: HOSPITAL | Age: OVER 89
End: 2025-01-19
Attending: INTERNAL MEDICINE
Payer: MEDICARE

## 2025-01-19 DIAGNOSIS — R00.1 BRADYCARDIA, UNSPECIFIED: ICD-10-CM

## 2025-01-19 PROCEDURE — 93296 REM INTERROG EVL PM/IDS: CPT | Performed by: INTERNAL MEDICINE

## 2025-02-27 DIAGNOSIS — I49.5 SSS (SICK SINUS SYNDROME): ICD-10-CM

## 2025-02-27 DIAGNOSIS — Z95.0 CARDIAC PACEMAKER IN SITU: Primary | ICD-10-CM

## 2025-04-10 ENCOUNTER — TELEPHONE (OUTPATIENT)
Dept: ELECTROPHYSIOLOGY | Facility: CLINIC | Age: OVER 89
End: 2025-04-10
Payer: MEDICARE

## 2025-04-11 ENCOUNTER — OFFICE VISIT (OUTPATIENT)
Dept: CARDIOLOGY | Facility: CLINIC | Age: OVER 89
End: 2025-04-11
Payer: MEDICARE

## 2025-04-11 ENCOUNTER — CLINICAL SUPPORT (OUTPATIENT)
Dept: CARDIOLOGY | Facility: CLINIC | Age: OVER 89
End: 2025-04-11
Attending: INTERNAL MEDICINE
Payer: MEDICARE

## 2025-04-11 VITALS
HEART RATE: 61 BPM | DIASTOLIC BLOOD PRESSURE: 82 MMHG | HEIGHT: 64 IN | SYSTOLIC BLOOD PRESSURE: 132 MMHG | BODY MASS INDEX: 23.05 KG/M2 | WEIGHT: 135 LBS

## 2025-04-11 DIAGNOSIS — Z95.0 CARDIAC PACEMAKER IN SITU: ICD-10-CM

## 2025-04-11 DIAGNOSIS — I50.32 CHRONIC DIASTOLIC HEART FAILURE: ICD-10-CM

## 2025-04-11 DIAGNOSIS — I49.5 SSS (SICK SINUS SYNDROME): Primary | ICD-10-CM

## 2025-04-11 DIAGNOSIS — I49.5 SSS (SICK SINUS SYNDROME): ICD-10-CM

## 2025-04-11 DIAGNOSIS — I10 PRIMARY HYPERTENSION: ICD-10-CM

## 2025-04-11 LAB
OHS CV AF BURDEN PERCENT: < 1
OHS CV DC REMOTE DEVICE TYPE: NORMAL
OHS CV RV PACING PERCENT: 1.7 %

## 2025-04-11 PROCEDURE — 99999 PR PBB SHADOW E&M-EST. PATIENT-LVL III: CPT | Mod: PBBFAC,,, | Performed by: INTERNAL MEDICINE

## 2025-04-11 PROCEDURE — 93280 PM DEVICE PROGR EVAL DUAL: CPT | Mod: S$GLB,,, | Performed by: INTERNAL MEDICINE

## 2025-04-11 NOTE — PROGRESS NOTES
Subjective   Patient ID:  Lu White is a 89 y.o. female who presents for follow-up of Pacemaker Check      HPI  I had the pleasure of seeing Mrs White today in our electrophysiology clinic in follow-up for her PPM. As you are aware she is a pleasant 89 year-old woman with hypertension and sick sinus syndrome s/p PPM. Felt short of breath for years. Observed to labile HRs 30s-100s, junctional at times. Underwent dcPPM implantation in November of 2021. Had improvement in her symptoms. She returns for annual PPM follow-up. Feels well. No complaints.     My interpretation of today's in-clinic PPM check is stable lead parameters with 69% RA and 1.7% RV pacing with 7% PVC burden and estimated battery longevity of 7.1 years.       Review of Systems   Constitutional: Negative for fever and malaise/fatigue.   HENT:  Negative for congestion and sore throat.    Eyes:  Negative for blurred vision and visual disturbance.   Cardiovascular:  Negative for chest pain, dyspnea on exertion, irregular heartbeat, near-syncope, palpitations and syncope.   Respiratory:  Negative for cough and shortness of breath.    Hematologic/Lymphatic: Negative for bleeding problem. Does not bruise/bleed easily.   Skin: Negative.    Musculoskeletal: Negative.    Gastrointestinal:  Negative for bloating, abdominal pain, hematochezia and melena.   Neurological:  Negative for focal weakness and weakness.   Psychiatric/Behavioral: Negative.            Objective     Physical Exam  Vitals reviewed.   Constitutional:       General: She is not in acute distress.     Appearance: She is well-developed. She is not diaphoretic.   HENT:      Head: Normocephalic and atraumatic.   Eyes:      General:         Right eye: No discharge.         Left eye: No discharge.      Conjunctiva/sclera: Conjunctivae normal.   Cardiovascular:      Rate and Rhythm: Normal rate and regular rhythm.      Heart sounds: No murmur heard.     No friction rub. No gallop.   Pulmonary:       Effort: Pulmonary effort is normal. No respiratory distress.      Breath sounds: Normal breath sounds. No wheezing or rales.   Abdominal:      General: Bowel sounds are normal. There is no distension.      Palpations: Abdomen is soft.      Tenderness: There is no abdominal tenderness.   Musculoskeletal:      Cervical back: Neck supple.   Skin:     General: Skin is warm and dry.   Neurological:      Mental Status: She is alert and oriented to person, place, and time.   Psychiatric:         Behavior: Behavior normal.         Thought Content: Thought content normal.         Judgment: Judgment normal.            Assessment and Plan     1. SSS (sick sinus syndrome)    2. Cardiac pacemaker in situ    3. Primary hypertension    4. Chronic diastolic heart failure        Plan:  In summary, Mrs White is a pleasant 89 year-old woman with hypertension and sick sinus syndrome s/p PPM. PPM is operating normally. Continue remote checks. RTC in 1 year.    Thank you for allowing me to participate in the care of this patient. Please do not hesitate to call me with any questions or concerns.    Lito Gordillo MD, PhD  Cardiac Electrophysiology

## 2025-04-20 ENCOUNTER — CLINICAL SUPPORT (OUTPATIENT)
Dept: CARDIOLOGY | Facility: HOSPITAL | Age: OVER 89
End: 2025-04-20
Payer: MEDICARE

## 2025-04-20 ENCOUNTER — CLINICAL SUPPORT (OUTPATIENT)
Dept: CARDIOLOGY | Facility: HOSPITAL | Age: OVER 89
End: 2025-04-20
Attending: INTERNAL MEDICINE
Payer: MEDICARE

## 2025-04-20 DIAGNOSIS — R00.1 BRADYCARDIA, UNSPECIFIED: ICD-10-CM

## 2025-04-20 PROCEDURE — 93296 REM INTERROG EVL PM/IDS: CPT | Performed by: INTERNAL MEDICINE

## 2025-04-20 PROCEDURE — 93294 REM INTERROG EVL PM/LDLS PM: CPT | Mod: ,,, | Performed by: INTERNAL MEDICINE

## 2025-04-28 LAB
OHS CV AF BURDEN PERCENT: < 1
OHS CV DC REMOTE DEVICE TYPE: NORMAL
OHS CV RV PACING PERCENT: 3.8 %

## 2025-07-20 ENCOUNTER — CLINICAL SUPPORT (OUTPATIENT)
Dept: CARDIOLOGY | Facility: HOSPITAL | Age: OVER 89
End: 2025-07-20
Payer: MEDICARE

## 2025-07-20 ENCOUNTER — CLINICAL SUPPORT (OUTPATIENT)
Dept: CARDIOLOGY | Facility: HOSPITAL | Age: OVER 89
End: 2025-07-20
Attending: INTERNAL MEDICINE
Payer: MEDICARE

## 2025-07-20 DIAGNOSIS — Z95.0 PRESENCE OF CARDIAC PACEMAKER: ICD-10-CM

## 2025-07-20 DIAGNOSIS — R00.1 BRADYCARDIA, UNSPECIFIED: ICD-10-CM

## 2025-07-20 PROCEDURE — 93296 REM INTERROG EVL PM/IDS: CPT | Performed by: INTERNAL MEDICINE

## 2025-07-20 PROCEDURE — 93294 REM INTERROG EVL PM/LDLS PM: CPT | Mod: ,,, | Performed by: INTERNAL MEDICINE

## 2025-07-28 LAB
OHS CV AF BURDEN PERCENT: < 1
OHS CV DC REMOTE DEVICE TYPE: NORMAL
OHS CV ICD SHOCK: NO
OHS CV RV PACING PERCENT: 2.4 %

## 2025-09-04 ENCOUNTER — TELEPHONE (OUTPATIENT)
Dept: PODIATRY | Facility: CLINIC | Age: OVER 89
End: 2025-09-04
Payer: MEDICARE

## (undated) DEVICE — SUT ETHILON 3-0 PS2 18 BLK

## (undated) DEVICE — SOL IRRI STRL WATER 1000ML

## (undated) DEVICE — SUT 4-0 ETHILON 18 PS-2

## (undated) DEVICE — DRESSING AQUACEL AG ADV 3.5X6

## (undated) DEVICE — UNDERGLOVES BIOGEL PI SIZE 8

## (undated) DEVICE — SCREW BONE NONLOCK HEX 3.5X10
Type: IMPLANTABLE DEVICE | Site: WRIST | Status: NON-FUNCTIONAL
Removed: 2023-12-08

## (undated) DEVICE — GLOVE BIOGEL PI MICRO SZ 7.5

## (undated) DEVICE — BANDAGE ELASTOPLAST 3INX5YDS

## (undated) DEVICE — SOCKINETTE DOUBLE PLY 4X48IN

## (undated) DEVICE — DRAPE U SPLIT SHEET 54X76IN

## (undated) DEVICE — ADHESIVE DERMABOND ADVANCED

## (undated) DEVICE — SPLINT PLASTER F.S 4INX15IN

## (undated) DEVICE — SLING ARM MEDIUM FOAM STRAP

## (undated) DEVICE — DRAPE C-ARM MINI DISP

## (undated) DEVICE — DRESSING XEROFORM NONADH 1X8IN

## (undated) DEVICE — TRAP DIGIT FINGER

## (undated) DEVICE — COVER LIGHT HANDLE 80/CA

## (undated) DEVICE — SLING SWATHE UNIVERSAL FOAM

## (undated) DEVICE — TIP DRIVER HEX LOCK GROOVE 1.5

## (undated) DEVICE — Device

## (undated) DEVICE — GAUZE SPONGE 4X4 12PLY

## (undated) DEVICE — DRAPE INCISE IOBAN 2 23X17IN

## (undated) DEVICE — PAD DEFIB CADENCE ADULT R2

## (undated) DEVICE — GOWN ECLIPSE REINF LVL4 TWL XL

## (undated) DEVICE — ELECTRODE REM PLYHSV RETURN 9

## (undated) DEVICE — DRILL QUICK RELEASE 2.8MM 5IN

## (undated) DEVICE — TOURNIQUET SB QC DP 18X4IN

## (undated) DEVICE — GUIDEWIRE ORTHO .054 X 6 IN
Type: IMPLANTABLE DEVICE | Site: WRIST | Status: NON-FUNCTIONAL
Removed: 2023-12-08

## (undated) DEVICE — BANDAGE ROLL COTTN 4.5INX4.1YD

## (undated) DEVICE — PAD CAST SPECIALIST STRL 4

## (undated) DEVICE — BANDAGE MATRIX HK LOOP 2IN 5YD

## (undated) DEVICE — COVER CAMERA OPERATING ROOM

## (undated) DEVICE — SOL IRR SOD CHL .9% POUR

## (undated) DEVICE — BANDAGE MATRIX HK LOOP 4IN 5YD

## (undated) DEVICE — SHEATH SAFESHEATH II ULTRA 6FR

## (undated) DEVICE — PACK PACER PERMANENT

## (undated) DEVICE — DRAPE STERI-DRAPE 1000 17X11IN

## (undated) DEVICE — SUT VICRYL 3-0 27 SH

## (undated) DEVICE — BIT DRILL QUICK RELEASE 2.0MM

## (undated) DEVICE — NDL 22GA X1 1/2 REG BEVEL